# Patient Record
Sex: FEMALE | NOT HISPANIC OR LATINO | Employment: FULL TIME | URBAN - METROPOLITAN AREA
[De-identification: names, ages, dates, MRNs, and addresses within clinical notes are randomized per-mention and may not be internally consistent; named-entity substitution may affect disease eponyms.]

---

## 2017-03-02 ENCOUNTER — APPOINTMENT (OUTPATIENT)
Dept: WOMENS IMAGING | Facility: HOSPITAL | Age: 52
End: 2017-03-02

## 2017-03-02 PROCEDURE — 77067 SCR MAMMO BI INCL CAD: CPT | Performed by: RADIOLOGY

## 2017-03-02 PROCEDURE — 77063 BREAST TOMOSYNTHESIS BI: CPT | Performed by: RADIOLOGY

## 2018-04-17 ENCOUNTER — APPOINTMENT (OUTPATIENT)
Dept: WOMENS IMAGING | Facility: HOSPITAL | Age: 53
End: 2018-04-17

## 2018-04-17 PROCEDURE — 77063 BREAST TOMOSYNTHESIS BI: CPT | Performed by: RADIOLOGY

## 2018-04-17 PROCEDURE — 77067 SCR MAMMO BI INCL CAD: CPT | Performed by: RADIOLOGY

## 2019-05-14 ENCOUNTER — APPOINTMENT (OUTPATIENT)
Dept: WOMENS IMAGING | Facility: HOSPITAL | Age: 54
End: 2019-05-14

## 2019-05-14 PROCEDURE — 77063 BREAST TOMOSYNTHESIS BI: CPT | Performed by: RADIOLOGY

## 2019-05-14 PROCEDURE — 77067 SCR MAMMO BI INCL CAD: CPT | Performed by: RADIOLOGY

## 2020-10-13 ENCOUNTER — APPOINTMENT (OUTPATIENT)
Dept: WOMENS IMAGING | Facility: HOSPITAL | Age: 55
End: 2020-10-13

## 2020-10-13 PROCEDURE — 77063 BREAST TOMOSYNTHESIS BI: CPT | Performed by: RADIOLOGY

## 2020-10-13 PROCEDURE — 77067 SCR MAMMO BI INCL CAD: CPT | Performed by: RADIOLOGY

## 2021-03-24 ENCOUNTER — BULK ORDERING (OUTPATIENT)
Dept: CASE MANAGEMENT | Facility: OTHER | Age: 56
End: 2021-03-24

## 2021-03-24 DIAGNOSIS — Z23 IMMUNIZATION DUE: ICD-10-CM

## 2022-04-26 ENCOUNTER — APPOINTMENT (OUTPATIENT)
Dept: WOMENS IMAGING | Facility: HOSPITAL | Age: 57
End: 2022-04-26

## 2022-04-26 PROCEDURE — 77063 BREAST TOMOSYNTHESIS BI: CPT | Performed by: RADIOLOGY

## 2022-04-26 PROCEDURE — 77067 SCR MAMMO BI INCL CAD: CPT | Performed by: RADIOLOGY

## 2022-05-31 ENCOUNTER — TELEPHONE (OUTPATIENT)
Dept: FAMILY MEDICINE CLINIC | Facility: CLINIC | Age: 57
End: 2022-05-31

## 2022-05-31 NOTE — TELEPHONE ENCOUNTER
OK for HUB to read and schedule:    LMTCB-  Your provider will be out of the office on 6/15/22 & your appointment needs to be rescheduled. Please call our office at [phone] to reschedule with another provider in our office or a later date with your provider.

## 2022-06-16 ENCOUNTER — OFFICE VISIT (OUTPATIENT)
Dept: FAMILY MEDICINE CLINIC | Facility: CLINIC | Age: 57
End: 2022-06-16

## 2022-06-16 VITALS
HEART RATE: 79 BPM | DIASTOLIC BLOOD PRESSURE: 74 MMHG | RESPIRATION RATE: 16 BRPM | HEIGHT: 59 IN | OXYGEN SATURATION: 100 % | SYSTOLIC BLOOD PRESSURE: 128 MMHG | WEIGHT: 161 LBS | TEMPERATURE: 97.5 F | BODY MASS INDEX: 32.46 KG/M2

## 2022-06-16 DIAGNOSIS — E78.2 MIXED HYPERLIPIDEMIA: ICD-10-CM

## 2022-06-16 DIAGNOSIS — E53.8 B12 DEFICIENCY: ICD-10-CM

## 2022-06-16 DIAGNOSIS — E11.65 TYPE 2 DIABETES MELLITUS WITH HYPERGLYCEMIA, WITHOUT LONG-TERM CURRENT USE OF INSULIN: Primary | ICD-10-CM

## 2022-06-16 DIAGNOSIS — F41.9 ANXIETY AND DEPRESSION: ICD-10-CM

## 2022-06-16 DIAGNOSIS — Z86.2 H/O THROMBOCYTOSIS: ICD-10-CM

## 2022-06-16 DIAGNOSIS — F32.A ANXIETY AND DEPRESSION: ICD-10-CM

## 2022-06-16 PROCEDURE — 99203 OFFICE O/P NEW LOW 30 MIN: CPT | Performed by: NURSE PRACTITIONER

## 2022-06-16 PROCEDURE — 90715 TDAP VACCINE 7 YRS/> IM: CPT | Performed by: NURSE PRACTITIONER

## 2022-06-16 PROCEDURE — 90471 IMMUNIZATION ADMIN: CPT | Performed by: NURSE PRACTITIONER

## 2022-06-16 RX ORDER — LISINOPRIL 2.5 MG/1
TABLET ORAL
COMMUNITY
Start: 2022-05-03 | End: 2022-06-16 | Stop reason: SDUPTHER

## 2022-06-16 RX ORDER — ATORVASTATIN CALCIUM 40 MG/1
TABLET, FILM COATED ORAL
COMMUNITY
Start: 2022-05-03 | End: 2022-06-16 | Stop reason: SDUPTHER

## 2022-06-16 RX ORDER — ASPIRIN 81 MG/1
81 TABLET, CHEWABLE ORAL DAILY
COMMUNITY

## 2022-06-16 RX ORDER — HYDROCHLOROTHIAZIDE 12.5 MG/1
CAPSULE, GELATIN COATED ORAL
COMMUNITY
Start: 2022-05-03 | End: 2022-06-16 | Stop reason: SDUPTHER

## 2022-06-16 RX ORDER — LISINOPRIL 2.5 MG/1
2.5 TABLET ORAL DAILY
Qty: 90 TABLET | Refills: 1 | Status: SHIPPED | OUTPATIENT
Start: 2022-06-16 | End: 2023-02-23

## 2022-06-16 RX ORDER — METFORMIN HYDROCHLORIDE 500 MG/1
500 TABLET, EXTENDED RELEASE ORAL
COMMUNITY
Start: 2022-02-15 | End: 2022-06-16 | Stop reason: SDUPTHER

## 2022-06-16 RX ORDER — HYDROCHLOROTHIAZIDE 12.5 MG/1
12.5 CAPSULE, GELATIN COATED ORAL EVERY MORNING
Qty: 90 CAPSULE | Refills: 1 | Status: SHIPPED | OUTPATIENT
Start: 2022-06-16 | End: 2023-02-23

## 2022-06-16 RX ORDER — ATORVASTATIN CALCIUM 40 MG/1
40 TABLET, FILM COATED ORAL NIGHTLY
Qty: 90 TABLET | Refills: 1 | Status: SHIPPED | OUTPATIENT
Start: 2022-06-16 | End: 2022-10-31 | Stop reason: DRUGHIGH

## 2022-06-16 RX ORDER — METFORMIN HYDROCHLORIDE 500 MG/1
500 TABLET, EXTENDED RELEASE ORAL
Qty: 90 TABLET | Refills: 1 | Status: SHIPPED | OUTPATIENT
Start: 2022-06-16 | End: 2023-02-23

## 2022-06-16 NOTE — PROGRESS NOTES
Subjective   Yuliana Moeller is a 56 y.o. female.     History of Present Illness   Yuliana Moeller 56 y.o. female who presents today for a new patient appointment.    she has a history of   Patient Active Problem List   Diagnosis   • Type 2 diabetes mellitus with hyperglycemia, without long-term current use of insulin (HCC)   • Mixed hyperlipidemia   • B12 deficiency   • Depression   • H/O thrombocytosis   .  she is here to establish care and is here to get their medications refilled I reviewed the PFSH recorded today by my MA/LPN staff.   she   She has been feeling fairly well.    She was previously seeing family medicine PCP at [place] for management of above issues.     Patient's last a1c was 11 but she has worked on diet and exercise and her weight has come down from 205 to 161 lbs.  She is due for labs but not fasting today.     She has hx of thrombocytopenia and had workup with hematology.  Will continue to monitor.      Sertraline has helped but feels the dose could be increased due to work related anxiety.   The following portions of the patient's history were reviewed and updated as appropriate: allergies, current medications, past family history, past medical history, past social history, past surgical history and problem list.    Review of Systems   Constitutional: Negative for unexpected weight change.   Respiratory: Negative for shortness of breath.    Cardiovascular: Negative for chest pain and palpitations.   Endocrine: Negative for cold intolerance, heat intolerance, polydipsia, polyphagia and polyuria.   Psychiatric/Behavioral: Negative for behavioral problems.       Objective   Physical Exam  Vitals and nursing note reviewed.   Constitutional:       Appearance: Normal appearance. She is well-developed.   Neck:      Vascular: No carotid bruit.   Cardiovascular:      Rate and Rhythm: Normal rate and regular rhythm.   Pulmonary:      Effort: Pulmonary effort is normal.      Breath sounds: Normal breath  sounds.   Neurological:      Mental Status: She is alert and oriented to person, place, and time.   Psychiatric:         Mood and Affect: Mood normal.         Behavior: Behavior normal.         Thought Content: Thought content normal.         Judgment: Judgment normal.         Assessment & Plan   Diagnoses and all orders for this visit:    1. Type 2 diabetes mellitus with hyperglycemia, without long-term current use of insulin (HCC) (Primary)  -     Comprehensive metabolic panel  -     Lipid panel  -     CBC and Differential  -     TSH  -     Hemoglobin A1c  -     MicroAlbumin, Urine, Random - Urine, Clean Catch  -     Vitamin B12  -     Vitamin D 25 Hydroxy  -     Discontinue: sertraline (ZOLOFT) 50 MG tablet; Take 1 tablet by mouth Daily.  Dispense: 90 tablet; Refill: 1  -     metFORMIN ER (GLUCOPHAGE-XR) 500 MG 24 hr tablet; Take 1 tablet by mouth Daily With Breakfast.  Dispense: 90 tablet; Refill: 1  -     lisinopril (PRINIVIL,ZESTRIL) 2.5 MG tablet; Take 1 tablet by mouth Daily.  Dispense: 90 tablet; Refill: 1  -     hydroCHLOROthiazide (MICROZIDE) 12.5 MG capsule; Take 1 capsule by mouth Every Morning.  Dispense: 90 capsule; Refill: 1  -     atorvastatin (LIPITOR) 40 MG tablet; Take 1 tablet by mouth Every Night.  Dispense: 90 tablet; Refill: 1  -     sertraline (ZOLOFT) 50 MG tablet; Take 1 tablet by mouth Daily.  Dispense: 135 tablet; Refill: 1    2. Mixed hyperlipidemia  -     Comprehensive metabolic panel  -     Lipid panel  -     CBC and Differential  -     TSH  -     Hemoglobin A1c  -     MicroAlbumin, Urine, Random - Urine, Clean Catch  -     Vitamin B12  -     Vitamin D 25 Hydroxy  -     Discontinue: sertraline (ZOLOFT) 50 MG tablet; Take 1 tablet by mouth Daily.  Dispense: 90 tablet; Refill: 1  -     metFORMIN ER (GLUCOPHAGE-XR) 500 MG 24 hr tablet; Take 1 tablet by mouth Daily With Breakfast.  Dispense: 90 tablet; Refill: 1  -     lisinopril (PRINIVIL,ZESTRIL) 2.5 MG tablet; Take 1 tablet by mouth  Daily.  Dispense: 90 tablet; Refill: 1  -     hydroCHLOROthiazide (MICROZIDE) 12.5 MG capsule; Take 1 capsule by mouth Every Morning.  Dispense: 90 capsule; Refill: 1  -     atorvastatin (LIPITOR) 40 MG tablet; Take 1 tablet by mouth Every Night.  Dispense: 90 tablet; Refill: 1  -     sertraline (ZOLOFT) 50 MG tablet; Take 1 tablet by mouth Daily.  Dispense: 135 tablet; Refill: 1    3. B12 deficiency  -     Comprehensive metabolic panel  -     Lipid panel  -     CBC and Differential  -     TSH  -     Hemoglobin A1c  -     MicroAlbumin, Urine, Random - Urine, Clean Catch  -     Vitamin B12  -     Vitamin D 25 Hydroxy  -     Discontinue: sertraline (ZOLOFT) 50 MG tablet; Take 1 tablet by mouth Daily.  Dispense: 90 tablet; Refill: 1  -     metFORMIN ER (GLUCOPHAGE-XR) 500 MG 24 hr tablet; Take 1 tablet by mouth Daily With Breakfast.  Dispense: 90 tablet; Refill: 1  -     lisinopril (PRINIVIL,ZESTRIL) 2.5 MG tablet; Take 1 tablet by mouth Daily.  Dispense: 90 tablet; Refill: 1  -     hydroCHLOROthiazide (MICROZIDE) 12.5 MG capsule; Take 1 capsule by mouth Every Morning.  Dispense: 90 capsule; Refill: 1  -     atorvastatin (LIPITOR) 40 MG tablet; Take 1 tablet by mouth Every Night.  Dispense: 90 tablet; Refill: 1  -     sertraline (ZOLOFT) 50 MG tablet; Take 1 tablet by mouth Daily.  Dispense: 135 tablet; Refill: 1    4. Anxiety and depression  -     Discontinue: sertraline (ZOLOFT) 50 MG tablet; Take 1 tablet by mouth Daily.  Dispense: 90 tablet; Refill: 1  -     metFORMIN ER (GLUCOPHAGE-XR) 500 MG 24 hr tablet; Take 1 tablet by mouth Daily With Breakfast.  Dispense: 90 tablet; Refill: 1  -     lisinopril (PRINIVIL,ZESTRIL) 2.5 MG tablet; Take 1 tablet by mouth Daily.  Dispense: 90 tablet; Refill: 1  -     hydroCHLOROthiazide (MICROZIDE) 12.5 MG capsule; Take 1 capsule by mouth Every Morning.  Dispense: 90 capsule; Refill: 1  -     atorvastatin (LIPITOR) 40 MG tablet; Take 1 tablet by mouth Every Night.  Dispense: 90  tablet; Refill: 1  -     sertraline (ZOLOFT) 50 MG tablet; Take 1 tablet by mouth Daily.  Dispense: 135 tablet; Refill: 1    5. H/O thrombocytosis  -     Comprehensive metabolic panel  -     Lipid panel  -     CBC and Differential  -     TSH  -     Hemoglobin A1c  -     MicroAlbumin, Urine, Random - Urine, Clean Catch  -     Vitamin B12  -     Vitamin D 25 Hydroxy  -     Discontinue: sertraline (ZOLOFT) 50 MG tablet; Take 1 tablet by mouth Daily.  Dispense: 90 tablet; Refill: 1  -     metFORMIN ER (GLUCOPHAGE-XR) 500 MG 24 hr tablet; Take 1 tablet by mouth Daily With Breakfast.  Dispense: 90 tablet; Refill: 1  -     lisinopril (PRINIVIL,ZESTRIL) 2.5 MG tablet; Take 1 tablet by mouth Daily.  Dispense: 90 tablet; Refill: 1  -     hydroCHLOROthiazide (MICROZIDE) 12.5 MG capsule; Take 1 capsule by mouth Every Morning.  Dispense: 90 capsule; Refill: 1  -     atorvastatin (LIPITOR) 40 MG tablet; Take 1 tablet by mouth Every Night.  Dispense: 90 tablet; Refill: 1  -     sertraline (ZOLOFT) 50 MG tablet; Take 1 tablet by mouth Daily.  Dispense: 135 tablet; Refill: 1

## 2022-06-22 ENCOUNTER — TELEPHONE (OUTPATIENT)
Dept: GASTROENTEROLOGY | Facility: CLINIC | Age: 57
End: 2022-06-22

## 2022-06-23 LAB
25(OH)D3+25(OH)D2 SERPL-MCNC: 32.4 NG/ML (ref 30–100)
ALBUMIN SERPL-MCNC: 4.5 G/DL (ref 3.8–4.9)
ALBUMIN/GLOB SERPL: 1.7 {RATIO} (ref 1.2–2.2)
ALP SERPL-CCNC: 136 IU/L (ref 44–121)
ALT SERPL-CCNC: 23 IU/L (ref 0–32)
AST SERPL-CCNC: 16 IU/L (ref 0–40)
BASOPHILS # BLD AUTO: 0.1 X10E3/UL (ref 0–0.2)
BASOPHILS NFR BLD AUTO: 1 %
BILIRUB SERPL-MCNC: 0.3 MG/DL (ref 0–1.2)
BUN SERPL-MCNC: 12 MG/DL (ref 6–24)
BUN/CREAT SERPL: 17 (ref 9–23)
CALCIUM SERPL-MCNC: 9.7 MG/DL (ref 8.7–10.2)
CHLORIDE SERPL-SCNC: 95 MMOL/L (ref 96–106)
CHOLEST SERPL-MCNC: 232 MG/DL (ref 100–199)
CO2 SERPL-SCNC: 25 MMOL/L (ref 20–29)
CREAT SERPL-MCNC: 0.72 MG/DL (ref 0.57–1)
EGFRCR SERPLBLD CKD-EPI 2021: 99 ML/MIN/1.73
EOSINOPHIL # BLD AUTO: 0.2 X10E3/UL (ref 0–0.4)
EOSINOPHIL NFR BLD AUTO: 3 %
ERYTHROCYTE [DISTWIDTH] IN BLOOD BY AUTOMATED COUNT: 11.8 % (ref 11.7–15.4)
GLOBULIN SER CALC-MCNC: 2.7 G/DL (ref 1.5–4.5)
GLUCOSE SERPL-MCNC: 393 MG/DL (ref 65–99)
HBA1C MFR BLD: >15.5 % (ref 4.8–5.6)
HCT VFR BLD AUTO: 43.4 % (ref 34–46.6)
HDLC SERPL-MCNC: 36 MG/DL
HGB BLD-MCNC: 14.6 G/DL (ref 11.1–15.9)
IMM GRANULOCYTES # BLD AUTO: 0 X10E3/UL (ref 0–0.1)
IMM GRANULOCYTES NFR BLD AUTO: 1 %
LDLC SERPL CALC-MCNC: 168 MG/DL (ref 0–99)
LYMPHOCYTES # BLD AUTO: 1.7 X10E3/UL (ref 0.7–3.1)
LYMPHOCYTES NFR BLD AUTO: 27 %
MCH RBC QN AUTO: 30.6 PG (ref 26.6–33)
MCHC RBC AUTO-ENTMCNC: 33.6 G/DL (ref 31.5–35.7)
MCV RBC AUTO: 91 FL (ref 79–97)
MICROALBUMIN UR-MCNC: 7.8 UG/ML
MONOCYTES # BLD AUTO: 0.4 X10E3/UL (ref 0.1–0.9)
MONOCYTES NFR BLD AUTO: 6 %
NEUTROPHILS # BLD AUTO: 3.9 X10E3/UL (ref 1.4–7)
NEUTROPHILS NFR BLD AUTO: 62 %
PLATELET # BLD AUTO: 353 X10E3/UL (ref 150–450)
POTASSIUM SERPL-SCNC: 4.3 MMOL/L (ref 3.5–5.2)
PROT SERPL-MCNC: 7.2 G/DL (ref 6–8.5)
RBC # BLD AUTO: 4.77 X10E6/UL (ref 3.77–5.28)
SODIUM SERPL-SCNC: 137 MMOL/L (ref 134–144)
TRIGL SERPL-MCNC: 151 MG/DL (ref 0–149)
TSH SERPL DL<=0.005 MIU/L-ACNC: 4.18 UIU/ML (ref 0.45–4.5)
VIT B12 SERPL-MCNC: 446 PG/ML (ref 232–1245)
VLDLC SERPL CALC-MCNC: 28 MG/DL (ref 5–40)
WBC # BLD AUTO: 6.3 X10E3/UL (ref 3.4–10.8)

## 2022-06-24 ENCOUNTER — TELEPHONE (OUTPATIENT)
Dept: FAMILY MEDICINE CLINIC | Facility: CLINIC | Age: 57
End: 2022-06-24

## 2022-06-24 DIAGNOSIS — E11.65 TYPE 2 DIABETES MELLITUS WITH HYPERGLYCEMIA, WITHOUT LONG-TERM CURRENT USE OF INSULIN: Primary | ICD-10-CM

## 2022-06-24 NOTE — TELEPHONE ENCOUNTER
----- Message from JOSEPHINE Dixon sent at 6/24/2022  9:10 AM EDT -----  A1c is over 15 which is exceptionally high.  Needs urgent referral to endocrinology.  Also needs to be started on Lantus 14 units at bedtime.  Inquire if taking atorvastatin when labs done bc LDL is still very high.

## 2022-07-01 ENCOUNTER — OFFICE VISIT (OUTPATIENT)
Dept: ENDOCRINOLOGY | Age: 57
End: 2022-07-01

## 2022-07-01 VITALS
BODY MASS INDEX: 32.25 KG/M2 | HEART RATE: 81 BPM | HEIGHT: 59 IN | DIASTOLIC BLOOD PRESSURE: 70 MMHG | SYSTOLIC BLOOD PRESSURE: 118 MMHG | OXYGEN SATURATION: 99 % | TEMPERATURE: 96.2 F | WEIGHT: 160 LBS

## 2022-07-01 DIAGNOSIS — E78.2 MIXED HYPERLIPIDEMIA: ICD-10-CM

## 2022-07-01 DIAGNOSIS — E11.65 TYPE 2 DIABETES MELLITUS WITH HYPERGLYCEMIA, WITH LONG-TERM CURRENT USE OF INSULIN: Primary | ICD-10-CM

## 2022-07-01 DIAGNOSIS — E11.65 TYPE 2 DIABETES MELLITUS WITH HYPERGLYCEMIA, WITHOUT LONG-TERM CURRENT USE OF INSULIN: ICD-10-CM

## 2022-07-01 DIAGNOSIS — Z79.4 TYPE 2 DIABETES MELLITUS WITH HYPERGLYCEMIA, WITH LONG-TERM CURRENT USE OF INSULIN: Primary | ICD-10-CM

## 2022-07-01 DIAGNOSIS — E66.09 CLASS 1 OBESITY DUE TO EXCESS CALORIES WITH SERIOUS COMORBIDITY AND BODY MASS INDEX (BMI) OF 32.0 TO 32.9 IN ADULT: ICD-10-CM

## 2022-07-01 DIAGNOSIS — I10 PRIMARY HYPERTENSION: ICD-10-CM

## 2022-07-01 PROBLEM — E66.811 CLASS 1 OBESITY DUE TO EXCESS CALORIES WITH SERIOUS COMORBIDITY AND BODY MASS INDEX (BMI) OF 32.0 TO 32.9 IN ADULT: Status: ACTIVE | Noted: 2022-07-01

## 2022-07-01 PROCEDURE — 99205 OFFICE O/P NEW HI 60 MIN: CPT | Performed by: INTERNAL MEDICINE

## 2022-07-01 RX ORDER — LANCETS 33 GAUGE
EACH MISCELLANEOUS
Qty: 400 EACH | Refills: 3 | Status: SHIPPED | OUTPATIENT
Start: 2022-07-01 | End: 2022-07-29

## 2022-07-01 RX ORDER — PEN NEEDLE, DIABETIC 32GX 5/32"
NEEDLE, DISPOSABLE MISCELLANEOUS
Qty: 500 EACH | Refills: 4 | Status: SHIPPED | OUTPATIENT
Start: 2022-07-01

## 2022-07-01 RX ORDER — INSULIN ASPART 100 [IU]/ML
INJECTION, SOLUTION INTRAVENOUS; SUBCUTANEOUS
Qty: 15 ML | Refills: 6 | Status: SHIPPED | OUTPATIENT
Start: 2022-07-01 | End: 2022-12-05 | Stop reason: SDUPTHER

## 2022-07-01 NOTE — PATIENT INSTRUCTIONS
Home instructions 6/30/22    Preventive care:  A.  Eyecare decreases blindness: Dilated eye examination recommended annually    B.  Dental care decreases heart attacks.  Dental cleanings twice a year recommended.    C..  Foot care significantly decreases infection and  loss of limbs.    Daily visualization and lotion of feet at bedtime.  Be mindful to wear shoes and socks  with toe covering to prevent trauma to toes.  Podiatry visit to monitor and treat foot disorders, toenail disorders and trimming.    D.  Exercise daily improves general health and ASSISTS  weight management.  Walking 15 minutes after each meal and if possible working up to 10,000 steps per day carefully with good tread shoes on stable ground.    E.  Weight management decreases worsening of diabetes.    Meal planning and scheduling of 3 meals and 3 snacks strongly encouraged and confirmed by research   to be the # 1 action that adults can take to reduce weight.  Exercise alone actually increases appetite.     E. Visit with your primary care provider for routine maintenance screening at annual visits to see if you need:     Example: Colon cancer screening, colonoscopies at age 50, mammograms, keep vaccinations up-to-date, screening for Hepatitis C in adults.  Routine female exams    Diabetes suppresses the healthy immune system.  Ask your provider if the following are for you: pneumonia vaccine, COVID-vaccine, shingles vaccine, hepatitis B vaccine in adults, .    F.  Biofeedback research indicates maintaining a positive attitude, getting to sleep at the same time every night, eating healthy and exercising regularly improves health.  The past as the past.  The future is every step you take to improve your health.    G. How to prevent heart attacks: High glucose over time promotes small and large blood vessel blockage like a water pipe with hard water exposure.  High cholesterol also promotes vessel blockage.     H. Protect your blood vessels to guard  against the damage to kidneys, nerves, vision, heart and stroke with correction of diabetes and elevated cholesterol.     You can make a significant difference by managing the food you eat. Avoiding saturated fats, avoiding large portions of carbohydrates and sweets.     Diabetes medications    Measure glucose before each meal and bedtime    Take Lantus  solostar 20 units at bedtime   Note if morning premeal glucose is over 150mg/dl x 3 mornings then increase lantus by 2 units. Repeat until reach am goal of   Take metformin  mg twice a day    Mealtime           Take Humalog, lispro/NovoLog,/ Novolin R /aspart per scale, 15 minutes before meals  Add scale as follows  FOOD  If total carbohydrate intake is 50 to 60 g then take 6 units plus scale  If total carbohydrates 30-50 g then take 5 units plus scale  If total carbohydrates less than 30 g then take 0 units plus the scale      If blood glucose is above 140 then add the following dose of Humalog, lispro/NovoLog, aspart to mealtime insulin [1: 30]    140-170 0 unit  171-200 2 units  201-230 3 units  231-260 4 units  261-290 5 units  291-320 6 units  321-350 7 units  351-380 8 units  381-or greater 9 units    Sick day rules: If glucose is over 250 mg/DL then check glucose every 2 hours and treat with correction scale above.    Example at breakfast take mealtime insulin as above and if glucose 268 then [per scale above] add 5 units.   Take total dose 15 minutes before meal. If at a restaurant, unsure when meal is coming then may take mealtime insulin immediately after the last bite of food.  -------------------------------------------------------------------------------------------------------------------------    Using cell phone or computer Mealtime total carbohydrate intake     Calorie Thier[employer] applications.      Meal planning  45 g of total carbohydrates plan  Meals 3 meals and 3 snacks  Please limit meals to 45 g per meal (3 servings)  Please limit snacks  to 15-20 g per snack (1 serving)    Counting total carbohydrates use phone henrry calorie [email or web address]    1 serving equal 15 g (about half a cup)  2 servings equal 30 g (about 1 cup)  3 servings equals 45 g  4 servings equal 60 g  5 servings ago 75 g  6 servings equals 90 g    Breakfast example (5 servings of carbohydrates)    A.  Half a cup cereal (1 servings) and 1 cup milk (1 serving) and 1 fruit/half a cup fruit juice (1 serving), 1 slice of bread with peanut butter (1 serving) = 4servings carbohydrate    B.  Eggs plus gamble plus sausage plus cheese with 2 slices of bread (2 servings) and one yogurt (1 serving )and one fruit (1 serving) and half a cup hash brown potatoes (1 serving) =5 servings carbohydrate    Lunch or dinner example  A.  [place] equals 2 slices of bread (2 servings) and 1 fruit (1 serving), half a cup of potato salad (1 serving) and half a cup of ice cream (1 serving ) = 5 servings carbohydrates    B.  Any meat/chicken/fish and 2 cups Pasta (4 servings) and 1 slice of bread (1 serving)  = 5 servings carbohydrate    C.  Any meat chicken fish 1 cup of rice (3 servings), 1 fruit (1 serving) = 4 servings of carbohydrate    May add vegetables/salads to any meal for free    Except the following are 1 serving of carbohydrates    A.  Lima beans half a cup (1 serving)  B.  1 cup kidney/simmons beans (1 serving 30 g -14 g fiber equals 16 g)  C.  Half a cup of corn (1 serving)  D.  Half cup of peas (1 serving)  E.  Half a cup of potato, cubes (medium potato) (1 serving)  F  Sweets: Prep packaged 3 ounce ice cream cup, Dial brand/Nestlé 1 serving (15 g carbohydrates)  Notes: Fruits: 1 fruit about the size for lady's visit is 1 serving of carbohydrate  Half a cup fruit juice 1 serving    Snack examples (1 serving carbohydrate/15 g]    1.  No carbohydrates (0 servings):  Raw celery, radishes, bell pepper, banana peppers, corn, carrots, cucumbers dipped in salad dressings, guacamole, [place] seasoning, peanut  butter, almond butter, salsa    2.  15 g (1 serving): Half a sandwich: Turkey and cheese, smoked salmon and cream cheese, peanut butter and banana slices, cheese and crackers, peanut butter and crackers, half a bagel or half an English muffin or 1 cup of fruit plus cottage cheese or 1 rice cake with sugar-free fruit spreading    3.  15 g or less (1 serving): Protein shakes: Ensure DM, Glucerna, boost protein: Wings and salad dressing, 1 taco    4.  Fruit, 1 servin apple/orange/8 large grapes/peach/a protein/fat (avocado/nuts/meat/ cottage cheese)      Hypercholesterolemia  meal planning  Nutrition counseling  1. Improve glucose control  2. Limit saturated fats to less than 30% at each meal [Use Calorie  nirav to read product labels]  3. Limit or avoid the following fats: Gamble, butter, lard, coconut. Substitute with turkey gamble, other vegetable oils, margarine  Limit or avoid marbling/fat on beef. Avoid hamburgers. Substitute with turkey/chicken/venison/vegetarians/salmon burgers.  Limit dairy: Milk cheese butter.  Limit eggs to 3 a week. Substitute with unlimited egg whites.  Limit bagels, biscuits, croissants: Substitute with wheat bread, English muffin, [place] pockets, tortilla

## 2022-07-01 NOTE — PROGRESS NOTES
Chief Complaint  Diabetes (Type 2)  E11.65 (ICD-10-CM) - Type 2 diabetes mellitus with hyperglycemia, without long-term current use of insulin (HCC)  Subjective      Yuliana Moeller presents with her spouse (he is the  of the home) to [facility] ENDOCRINOLOGY  History of Present Illness   In 2021 she had an A1C of 11 % so she lost 45 lbs to lower it. More recently HGA1C 15% she is currently on metformin.  Lantus was prescribed 14 units daily.  She just picked up the Lantus Solostar pen and has not yet started using it.      Prevention: Lisinopril 2.5 mg  Lantus Solostar, not started.   Metformin  mg  Duration of diabetes: for 10 years. Her birth father     Since last visit : Initial visit today as requested by Daja Connor APRN     Monitoring glucose:  CGM use: Introduced today and we will place a sample and ordered.  Glucometer use:  How often patient is testing: None  In the last month lowest glucose: Does not test  In the last month highest glucose: Noted on most recent CMP    Symptoms:  Hypoglycemia: None  Hyperglycemia: Present frequent urination and frequent thirst  Peripheral neuropathy symptoms of paresthesias burning numbness tingling: Present in the hands along with some swelling.  Vision changes: Blurred vision uses readers  Skin changes or ulcers: None  Polyuria: Significantly present  Polydipsia: Significantly present  Gastroparesis symptoms: No early satiety    Prevention:  Last eye examination never.  Instructed patient  Last podiatry visit never.    History of heart disease: None  History of kidney disease: None  History of stroke: None    History of hypertension: Patient takes hydrochlorothiazide 12.5 mg daily and lisinopril 2.5 mg daily  History of hyperlipidemia: Patient takes atorvastatin 40 mg daily      Past Medical History:   Diagnosis Date   • Diabetes mellitus (HCC) unknown    pre-diabetes   • Obesity unknown    have lost over 40 pounds   History  "reviewed. No pertinent surgical history.   Social History     Socioeconomic History   • Marital status:    Tobacco Use   • Smoking status: Never Smoker   Substance and Sexual Activity   • Alcohol use: Yes     Comment: very little   • Drug use: Never   • Sexual activity: Defer     Partners: Male     Objective   Vital Signs:  /70   Pulse 81   Temp 96.2 °F (35.7 °C)   Ht 149.9 cm (59.02\")   Wt 72.6 kg (160 lb)   SpO2 99%   BMI 32.30 kg/m²   Estimated body mass index is 32.3 kg/m² as calculated from the following:    Height as of this encounter: 149.9 cm (59.02\").    Weight as of this encounter: 72.6 kg (160 lb).    BMI is >= 30 and <35. (Class 1 Obesity). The following options were offered after discussion;: nutrition counseling/recommendations and referral to a nutritionist      Physical Exam  Vitals and nursing note reviewed.   HENT:      Head: Normocephalic.      Mouth/Throat:      Mouth: Mucous membranes are moist.   Cardiovascular:      Rate and Rhythm: Normal rate.      Pulses: Normal pulses.           Dorsalis pedis pulses are 2+ on the right side and 2+ on the left side.        Posterior tibial pulses are 2+ on the right side and 2+ on the left side.      Heart sounds: Normal heart sounds.   Pulmonary:      Effort: Pulmonary effort is normal.      Breath sounds: Normal breath sounds. No wheezing or rhonchi.   Abdominal:      General: Bowel sounds are normal.      Palpations: Abdomen is soft.   Musculoskeletal:         General: No swelling. Normal range of motion.      Cervical back: Normal range of motion and neck supple.   Feet:      Right foot:      Protective Sensation: 10 sites tested. 10 sites sensed.      Skin integrity: Skin integrity normal.      Left foot:      Protective Sensation: 10 sites tested. 10 sites sensed.      Skin integrity: Skin integrity normal.   Skin:     General: Skin is warm and dry.   Neurological:      Mental Status: She is alert and oriented to person, place, and " time. Mental status is at baseline.   Psychiatric:         Mood and Affect: Mood normal.         Behavior: Behavior normal.         Judgment: Judgment normal.        Result Review :The following data was reviewed by: Hansa Anthony MD on 07/01/2022:           Component      Latest Ref Rng & Units 6/22/2022 6/22/2022 6/22/2022 6/22/2022           8:12 AM  8:12 AM  8:12 AM  8:12 AM   WBC      3.4 - 10.8 x10E3/uL   6.3    RBC      3.77 - 5.28 x10E6/uL   4.77    Hemoglobin      11.1 - 15.9 g/dL   14.6    Hematocrit      34.0 - 46.6 %   43.4    MCV      79 - 97 fL   91    MCH      26.6 - 33.0 pg   30.6    MCHC      31.5 - 35.7 g/dL   33.6    RDW      11.7 - 15.4 %   11.8    Platelets      150 - 450 x10E3/uL   353    Neutrophil Rel %      Not Estab. %   62    Lymphocyte Rel %      Not Estab. %   27    Monocyte Rel %      Not Estab. %   6    Eosinophil Rel %      Not Estab. %   3    Basophil Rel %      Not Estab. %   1    Neutrophils Absolute      1.4 - 7.0 x10E3/uL   3.9    Lymphocytes Absolute      0.7 - 3.1 x10E3/uL   1.7    Monocytes Absolute      0.1 - 0.9 x10E3/uL   0.4    Eosinophils Absolute      0.0 - 0.4 x10E3/uL   0.2    Basophils Absolute      0.0 - 0.2 x10E3/uL   0.1    Immature Granulocyte Rel %      Not Estab. %   1    Immature Grans, Absolute      0.0 - 0.1 x10E3/uL   0.0    Glucose      65 - 99 mg/dL 393 (H)      BUN      6 - 24 mg/dL 12      Creatinine      0.57 - 1.00 mg/dL 0.72      EGFR Result      >59 mL/min/1.73 99      BUN/Creatinine Ratio      9 - 23 17      Sodium      134 - 144 mmol/L 137      Potassium      3.5 - 5.2 mmol/L 4.3      Chloride      96 - 106 mmol/L 95 (L)      CO2      20 - 29 mmol/L 25      Calcium      8.7 - 10.2 mg/dL 9.7      Total Protein      6.0 - 8.5 g/dL 7.2      Albumin      3.8 - 4.9 g/dL 4.5      Globulin      1.5 - 4.5 g/dL 2.7      A/G Ratio      1.2 - 2.2 1.7      Total Bilirubin      0.0 - 1.2 mg/dL 0.3      Alkaline Phosphatase      44 - 121 IU/L 136 (H)       AST (SGOT)      0 - 40 IU/L 16      ALT (SGPT)      0 - 32 IU/L 23      Total Cholesterol      100 - 199 mg/dL  232 (H)     Triglycerides      0 - 149 mg/dL  151 (H)     HDL Cholesterol      >39 mg/dL  36 (L)     VLDL Cholesterol Meek      5 - 40 mg/dL  28     LDL Cholesterol       0 - 99 mg/dL  168 (H)     TSH Baseline      0.450 - 4.500 uIU/mL    4.180     Component      Latest Ref Rng & Units 6/22/2022 6/22/2022 6/22/2022 6/22/2022           8:12 AM  8:12 AM  8:12 AM  8:12 AM   Hemoglobin A1C      4.8 - 5.6 % >15.5 (H)      Microalbumin, Urine      Not Estab. ug/mL  7.8     Vitamin B-12      232 - 1,245 pg/mL   446    25 Hydroxy, Vitamin D      30.0 - 100.0 ng/mL    32.4        Assessment and Plan  56-year-old  female with type 2 diabetes, BMI 32 on insulin therapy.  Comorbidities: Hyperlipidemia, hypertension, depression, B12 deficiency  Significance: No history of nephropathy, neuropathy, retinopathy    Diabetes medications    Measure glucose before each meal and bedtime    Take Lantus  solostar 20 units at bedtime   Note if morning premeal glucose is over 150mg/dl x 3 mornings then increase lantus by 2 units. Repeat until reach am goal of   Take metformin  mg twice a day  Mealtime           Take Humalog, lispro/NovoLog,/ Novolin R /aspart per scale, 15 minutes before meals  Add scale as follows  FOOD  If total carbohydrate intake is 50 to 60 g then take 6 units plus scale  If total carbohydrates 30-50 g then take 5 units plus scale  If total carbohydrates less than 30 g then take 0 units plus the scale      If blood glucose is above 140 then add the following dose of Humalog, lispro/NovoLog, aspart to mealtime insulin [1: 30]    140-170 0 unit  171-200 2 units  201-230 3 units  231-260 4 units  261-290 5 units  291-320 6 units  321-350 7 units  351-380 8 units  381-or greater 9 units    Sick day rules: If glucose is over 250 mg/DL then check glucose every 2 hours and treat with  correction scale above.    Example at breakfast take mealtime insulin as above and if glucose 268 then [per scale above] add 5 units.   Take total dose 15 minutes before meal. If at a restaurant, unsure when meal is coming then may take mealtime insulin immediately after the last bite of food.  -------------------------------------------------------------------------------------------------------------------------    Using cell phone or computer Mealtime total carbohydrate intake     Calorie Thierry applications.      Meal planning  45 g of total carbohydrates plan  Meals 3 meals and 3 snacks  Please limit meals to 45 g per meal (3 servings)  Please limit snacks to 15-20 g per snack (1 serving)          Diagnoses and all orders for this visit:    1. Type 2 diabetes mellitus with hyperglycemia, with long-term current use of insulin (HCC) (Primary)  -     Anti-islet Cell Antibody  -     Glutamic Acid Decarboxylase  -     C-Peptide  -     TSH Rfx On Abnormal To Free T4  -     Microalbumin / Creatinine Urine Ratio - Urine, Clean Catch  -     Insulin Antibody  -     Ambulatory Referral to Nutrition Services  -     Ambulatory Referral to Diabetic Education  -     insulin aspart (NovoLOG FlexPen) 100 UNIT/ML solution pen-injector sc pen; Maximum 50 units per day 5-6 units plus scale of target 140 , 1  units per 30 mg/dl glucose ac tid Use as directed for meal & correction coverage up to 50 units a day.  Dispense: 15 mL; Refill: 6  -     Insulin Pen Needle (ReliOn Pen Needles) 32G X 4 MM misc; For use with pen device up to 5 times a day. Can substitute based on availability and insurance. Dx E11.65  Dispense: 500 each; Refill: 4  -     Continuous Blood Gluc Sensor (FreeStyle Prasanna 2 Sensor) misc; 1 each Every 14 (Fourteen) Days. Freestyle Prasanna 14-day sensors one for each 14 day period to check 6 times a day. Dx: E 11.65  Dispense: 6 each; Refill: 4    2. Type 2 diabetes mellitus with hyperglycemia, without long-term current  use of insulin (HCC)    3. Mixed hyperlipidemia    4. Primary hypertension    5. Class 1 obesity due to excess calories with serious comorbidity and body mass index (BMI) of 32.0 to 32.9 in adult    Other orders  -     Insulin Glargine (LANTUS SOLOSTAR) 100 UNIT/ML injection pen; Inject 20 Units under the skin into the appropriate area as directed Every Night. And as directed to max of 50 units per day.  Dispense: 3 mL; Refill: 5      Hyperlipidemia with elevated cholesterol LDL.  Encourage limiting saturated fats and statin drug.  Hypertension stable continue current medications.  Prevention: Patient is on lisinopril 2.5 mg daily.  Will consider changing to ramipril 2.5 mg daily for better protection at that dose at next visit.  Will check urine microalbumin today.  Recommend delay eye examination for at least 6 weeks due to glucose being so severely uncontrolled now.  Patient instructed on meal planning, how to use a glucometer, glucometer given, test trips ordered, CGM freestyle roman describe discussed sample given and order placed to pharmacy.  Applications on phone for counting carbohydrates and CGM given installed.  Questions answered.     I spent 65 minutes caring for Yuliana on this date of service. This time includes time spent by me in the following activities:reviewing tests, obtaining and/or reviewing a separately obtained history, performing a medically appropriate examination and/or evaluation , counseling and educating the patient/family/caregiver, ordering medications, tests, or procedures and referring and communicating with other health care professionals   Follow Up Return in about 4 weeks (around 7/29/2022) for Recheck.  Patient was given instructions and counseling regarding her condition or for health maintenance advice. Please see specific information pulled into the AVS if appropriate.

## 2022-07-09 LAB
ALBUMIN/CREAT UR: 10 MG/G CREAT (ref 0–29)
C PEPTIDE SERPL-MCNC: 2.4 NG/ML (ref 1.1–4.4)
CREAT UR-MCNC: 60.6 MG/DL
GAD65 AB SER IA-ACNC: ABNORMAL U/ML (ref 0–5)
INSULIN AB SER-ACNC: <5 UU/ML
MICROALBUMIN UR-MCNC: 5.9 UG/ML
PANC ISLET CELL AB TITR SER: NEGATIVE {TITER}
TSH SERPL DL<=0.005 MIU/L-ACNC: 2.88 UIU/ML (ref 0.45–4.5)

## 2022-07-11 ENCOUNTER — TELEPHONE (OUTPATIENT)
Dept: ENDOCRINOLOGY | Age: 57
End: 2022-07-11

## 2022-07-25 DIAGNOSIS — E13.9: Primary | ICD-10-CM

## 2022-07-25 PROBLEM — E11.65 TYPE 2 DIABETES MELLITUS WITH HYPERGLYCEMIA, WITHOUT LONG-TERM CURRENT USE OF INSULIN: Status: RESOLVED | Noted: 2022-06-16 | Resolved: 2022-07-25

## 2022-07-25 NOTE — PROGRESS NOTES
Chief Complaint  Chief Complaint   Patient presents with   • Diabetes     Type2: Prasanna        Subjective          History of Present Illness    Yuliana Moeller 56 y.o. 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)    Patient was diagnosed around 2017 and started insulin 07/22  WILLIS positive in 07/22    Pt is on the following medications for their DM: Lantus 22 units at bedtime metformin  mg BID      If total carbohydrate intake is 50 to 60 g then take 6 units plus scale  If total carbohydrates 30-50 g then take 5 units plus scale  If total carbohydrates less than 30 g then take 0 units plus the scale      If blood glucose is above 140 then add the following dose of NovoLog to mealtime insulin    140-170          0 unit  171-200          2 units  201-230          3 units  231-260          4 units  261-290          5 units  291-320          6 units  321-350          7 units  351-380          8 units  381-or greater 9 units    Sick day rules: If glucose is over 250 mg/DL then check glucose every 2 hours and treat with correction scale above.        Pt complains of vision changes.    Denies diarrhea, constipation, chest pain, shortness of breath and numbness and tingling in feet/hands.    Last eye exam was over 1 year - pt is going to schedule    Pt does not have a history of nephropathy.  Patient is currently taking ACE    Pt does not have neuropathy.    Pt does not have a history of CAD or CVA.    Last A1C in 06/22 was >15.5    Last microalbumin in 07/22 was negative          Blood Sugars    Blood glucoses are checked via prasanna.    Fasting blood glucoses: mid 100s    Pre-meal blood glucoses: 60s - 200s    Pt has has some episodes of hypoglycemia, mostly after breakfast          Sensor Data    Time in range 75%  High 19%  Very high 6%  Low 0%  Very low 0%      Average Glucose - 159 mg/dL      Sensor Wear - 95 %            Hyperlipidemia     Pt denies any muscle/body  "aches, chest pain, or shortness of breath    Pt is currently taking atorvastatin 40 mg HS    Last lipid panel in 06/22 showed Total 232, Triglycerides 151, HDL 36 and             Hypertension    Pt denies any chest pain, palpitations, shortness of breath, or headache    Current regimen includes lisinopril 2.5 mg daily and HCTZ 12.5 mg daily             I have reviewed the patient's allergies, medicines, past medical hx, family hx and social hx.    Objective   Vital Signs:   /80   Pulse 77   Temp 97.8 °F (36.6 °C) (Temporal)   Ht 149.9 cm (59\")   Wt 75.3 kg (166 lb)   SpO2 99%   BMI 33.53 kg/m²       Physical Exam   Physical Exam  Constitutional:       General: She is not in acute distress.     Appearance: Normal appearance. She is not diaphoretic.   HENT:      Head: Normocephalic and atraumatic.   Eyes:      General:         Right eye: No discharge.         Left eye: No discharge.   Skin:     General: Skin is warm and dry.   Neurological:      Mental Status: She is alert.   Psychiatric:         Mood and Affect: Mood normal.         Behavior: Behavior normal.                    Results Review:   Hemoglobin A1C   Date Value Ref Range Status   06/22/2022 >15.5 (H) 4.8 - 5.6 % Final     Comment:     **Verified by repeat analysis**           Prediabetes: 5.7 - 6.4           Diabetes: >6.4           Glycemic control for adults with diabetes: <7.0     05/26/2021 11.0 (H) 4.3 - 5.6 % Final     Comment:     (note)  A1C% Reference Range:  4.3 - 5.6  Normal range  5.7 - 6.4  Pre-diabetic -increased risk for developing diabetes mellitus.  >=6.5      Diabetic -diagnostic of diabetes mellitus.     Note: For diagnosis of diabetes in individuals without unequivocal   hyperglycemia, results should be confirmed by repeat testing.  Patients with conditions that shorten erythrocyte survival, such as  recovery from acute blood loss, hemolytic anemia, kidney disease,  or the presence of unstable hemoglobins like HbSS, " HbCC, and HbSC  may yield falsely decreased HbA1c test results. Iron deficiency may  yield falsely increased HbA1c test results.     Triglycerides   Date Value Ref Range Status   06/22/2022 151 (H) 0 - 149 mg/dL Final   09/30/2020 156 (H) 0 - 149 mg/dL Final     HDL Cholesterol   Date Value Ref Range Status   06/22/2022 36 (L) >39 mg/dL Final   09/30/2020 47 (L) >49 mg/dL Final     LDL Cholesterol    Date Value Ref Range Status   09/30/2020 160 (H) 0 - 100 mg/dL Final     LDL Chol Calc (NIH)   Date Value Ref Range Status   06/22/2022 168 (H) 0 - 99 mg/dL Final     VLDL Cholesterol   Date Value Ref Range Status   09/30/2020 31 5 - 40 mg/dL Final     VLDL Cholesterol Meek   Date Value Ref Range Status   06/22/2022 28 5 - 40 mg/dL Final         Assessment and Plan {CC Problem List  Visit Diagnosis  ROS  Review (Popup)  Health Maintenance  Quality  BestPractice  Medications  SmartSets  SnapShot Encounters  Media :23  Diagnoses and all orders for this visit:    1. 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 And as directed to max of 50 units per day.  Dispense: 12 pen; Refill: 1    Increase Lantus 26 units at bedtime   Decrease Novolog breakfast base to 4 units then add sliding scale  Continue with sane Novolog with all other meals  Continue with metformin  mg BID  Change over to Dexcom from roman - insurance will not cover  I reviewed with pt what it means to be AISHWARYA and how if differs from type 2 diabetes.  Pt has seen diabetic educator and is scheduled to see dietician      2. Mixed hyperlipidemia    Continue with statin    3. Primary hypertension    Stable  Continue with current medication regimen  Defer management to PCP          Refills sent to pharmacy      RTC in 1 month with me      Follow Up      Patient was given instructions and counseling regarding her condition or for health maintenance advice. Please see specific information pulled into the AVS if appropriate.              JOSEPHINE Wayne  07/29/22

## 2022-07-26 ENCOUNTER — CLINICAL SUPPORT (OUTPATIENT)
Dept: FAMILY MEDICINE CLINIC | Facility: CLINIC | Age: 57
End: 2022-07-26

## 2022-07-26 DIAGNOSIS — Z23 IMMUNIZATION DUE: Primary | ICD-10-CM

## 2022-07-26 PROCEDURE — 90471 IMMUNIZATION ADMIN: CPT | Performed by: NURSE PRACTITIONER

## 2022-07-26 PROCEDURE — 90750 HZV VACC RECOMBINANT IM: CPT | Performed by: NURSE PRACTITIONER

## 2022-07-26 NOTE — PROGRESS NOTES
Immunization  Immunization performed in LD by Dian Carlos MA. Patient tolerated the procedure well without complications.  07/26/22   Dian Carlos MA

## 2022-07-27 ENCOUNTER — APPOINTMENT (OUTPATIENT)
Dept: DIABETES SERVICES | Facility: HOSPITAL | Age: 57
End: 2022-07-27

## 2022-07-27 ENCOUNTER — HOSPITAL ENCOUNTER (OUTPATIENT)
Dept: DIABETES SERVICES | Facility: HOSPITAL | Age: 57
Discharge: HOME | End: 2022-07-27
Admitting: INTERNAL MEDICINE

## 2022-07-27 PROCEDURE — G0108 DIAB MANAGE TRN  PER INDIV: HCPCS

## 2022-07-27 NOTE — NURSING NOTE
Diabetes Education    Patient Name:  Yuliana Moeller  YOB: 1965  MRN: [id number]  Admit Date:  7/27/2022      PATIENT was seen by/met with Diabetes Educator for Diabetes. Input short narrative of patient involvement in meeting.  Consistent with the ADA’s standards of care, a comprehensive assessment/training record has been sent to medical records to scan and associate with this encounter. PATIENT has been encouraged to call our office with questions or additional education needs. Please place referral for additional services or follow-up should need arise.     Discussed with pt and her  her Insulin therapy. Using Information sheet MD has given,we reviewed when and how to make insulin adjustments. We reviewed the Prasanna results on pts henrry. We discussed High/Lows and need to get a Glucagon kit at home.Covered sick day rules.  Pt not very active and very stressed with new diagnosis and busy job. Discussed importance of exercise and stress reduction.  Pt has not has eye exam in 2 years an is planning on making appointment soon.  Note sent to MA and CARMEN in my chart to look into pts insurance coverage of her Prasanna and getting a Glucagons kit ordered.    Thank you for the referral.          Electronically signed by:  Sharmin Carreon RN  07/27/22 13:07 EDT

## 2022-07-28 DIAGNOSIS — E13.9: Primary | ICD-10-CM

## 2022-07-29 ENCOUNTER — OFFICE VISIT (OUTPATIENT)
Dept: ENDOCRINOLOGY | Age: 57
End: 2022-07-29

## 2022-07-29 VITALS
HEIGHT: 59 IN | OXYGEN SATURATION: 99 % | HEART RATE: 77 BPM | SYSTOLIC BLOOD PRESSURE: 122 MMHG | BODY MASS INDEX: 33.47 KG/M2 | WEIGHT: 166 LBS | TEMPERATURE: 97.8 F | DIASTOLIC BLOOD PRESSURE: 80 MMHG

## 2022-07-29 DIAGNOSIS — I10 PRIMARY HYPERTENSION: ICD-10-CM

## 2022-07-29 DIAGNOSIS — E13.9: Primary | ICD-10-CM

## 2022-07-29 DIAGNOSIS — E78.2 MIXED HYPERLIPIDEMIA: ICD-10-CM

## 2022-07-29 PROCEDURE — 95251 CONT GLUC MNTR ANALYSIS I&R: CPT | Performed by: NURSE PRACTITIONER

## 2022-07-29 PROCEDURE — 99214 OFFICE O/P EST MOD 30 MIN: CPT | Performed by: NURSE PRACTITIONER

## 2022-07-29 NOTE — PATIENT INSTRUCTIONS
Increase Lantus 26 units at bedtime   Decrease Novolog breakfast base to 4 units then add sliding scale  Continue with sane Novolog with all other meals

## 2022-08-05 ENCOUNTER — PRIOR AUTHORIZATION (OUTPATIENT)
Dept: ENDOCRINOLOGY | Age: 57
End: 2022-08-05

## 2022-08-16 ENCOUNTER — TREATMENT (OUTPATIENT)
Dept: ENDOCRINOLOGY | Age: 57
End: 2022-08-16

## 2022-08-16 NOTE — PROGRESS NOTES
Reviewed CGM download    Called and talked to patient - decrease Lantus to 20 units daily.  She has been taking ~ 4 units with breakfast, 6 units with lunch and ~ 10 units with dinner.  Other lows look to be where her pre-meal BG is low 70 to low 100s, she takes the same amount of insulin and then goes low within a couple of hours after eating.  Advised that if that is the cause then try taking half the usual dose of her meal time insulin.

## 2022-08-29 NOTE — PROGRESS NOTES
Chief Complaint  Chief Complaint   Patient presents with   • Diabetes     Type1: Prasanna       Subjective          History of Present Illness    Yuliana Moeller 56 y.o. 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          Denies diarrhea, constipation, chest pain, shortness of breath, vision changes, numbness and tingling in feet/hands.    Weight gain of 3 lbs since last visit.    Last eye exam was over 1 year - pt is going to schedule     Pt does not have a history of nephropathy.  Patient is currently taking ACE     Pt does not have neuropathy.     Pt does not have a history of CAD or CVA.     Last A1C in 06/22 was >15.5     Last microalbumin in 07/22 was negative              Blood Sugars    Blood glucoses are checked via Prasanna.    Fasting blood glucoses: 60-mid 100s    Pre-meal blood glucoses: 60s - 200s    Pt has has some episodes of hypoglycemia due to taking too much insulin for the meal that she was eating          Sensor Data    Time in range 91%  High 9%  Very high 0%  Low 0%  Very low 0%      Average Glucose - 131 mg/dL      Sensor Wear - 94 %          Hyperlipidemia     Pt denies any muscle/body aches, chest pain, or shortness of breath    Pt is currently taking atorvastatin 40 mg HS     Last lipid panel in 06/22 showed Total 232, Triglycerides 151, HDL 36 and                 Hypertension    Pt denies any chest pain, palpitations, shortness of breath, or headache    Current regimen includes lisinopril 2.5 mg daily and HCTZ 12.5 mg daily             I have reviewed the patient's allergies,  "medicines, past medical hx, family hx and social hx.    Objective   Vital Signs:   /80   Pulse 79   Temp 98.4 °F (36.9 °C) (Temporal)   Ht 149.9 cm (59\")   Wt 77 kg (169 lb 12.8 oz)   SpO2 98%   BMI 34.30 kg/m²       Physical Exam   Physical Exam  Constitutional:       General: She is not in acute distress.     Appearance: Normal appearance. She is not diaphoretic.   HENT:      Head: Normocephalic and atraumatic.   Eyes:      General:         Right eye: No discharge.         Left eye: No discharge.   Skin:     General: Skin is warm and dry.   Neurological:      Mental Status: She is alert.   Psychiatric:         Mood and Affect: Mood normal.         Behavior: Behavior normal.                    Results Review:   Hemoglobin A1C   Date Value Ref Range Status   06/22/2022 >15.5 (H) 4.8 - 5.6 % Final     Comment:     **Verified by repeat analysis**           Prediabetes: 5.7 - 6.4           Diabetes: >6.4           Glycemic control for adults with diabetes: <7.0     05/26/2021 11.0 (H) 4.3 - 5.6 % Final     Comment:     (note)  A1C% Reference Range:  4.3 - 5.6  Normal range  5.7 - 6.4  Pre-diabetic -increased risk for developing diabetes mellitus.  >=6.5      Diabetic -diagnostic of diabetes mellitus.     Note: For diagnosis of diabetes in individuals without unequivocal   hyperglycemia, results should be confirmed by repeat testing.  Patients with conditions that shorten erythrocyte survival, such as  recovery from acute blood loss, hemolytic anemia, kidney disease,  or the presence of unstable hemoglobins like HbSS, HbCC, and HbSC  may yield falsely decreased HbA1c test results. Iron deficiency may  yield falsely increased HbA1c test results.     Triglycerides   Date Value Ref Range Status   06/22/2022 151 (H) 0 - 149 mg/dL Final   09/30/2020 156 (H) 0 - 149 mg/dL Final     HDL Cholesterol   Date Value Ref Range Status   06/22/2022 36 (L) >39 mg/dL Final   09/30/2020 47 (L) >49 mg/dL Final     LDL " Cholesterol    Date Value Ref Range Status   09/30/2020 160 (H) 0 - 100 mg/dL Final     LDL Chol Calc (NIH)   Date Value Ref Range Status   06/22/2022 168 (H) 0 - 99 mg/dL Final     VLDL Cholesterol   Date Value Ref Range Status   09/30/2020 31 5 - 40 mg/dL Final     VLDL Cholesterol Meek   Date Value Ref Range Status   06/22/2022 28 5 - 40 mg/dL Final         Assessment and Plan {CC Problem List  Visit Diagnosis  ROS  Review (Popup)  Health Maintenance  Quality  BestPractice  Medications  SmartSets  SnapShot Encounters  Media :23  Diagnoses and all orders for this visit:    1. Latent autoimmune diabetes mellitus in adult (AISHWARYA), managed as type 1 (HCC) (Primary)  -     Hemoglobin A1c; Future  -     Comprehensive Metabolic Panel; Future    Continue with Lantus 20 units at bedtime,  Continue with  Novolog 4 units before with breakfast, 6 units with lunch and 10 units with dinner except If pre-meal BG is 80 to low 100s, take half the usual dose of her meal time insulin.  Continue with metformin  mg BID  Need to make eye exam appointment  Continue with roman  Check labs prior to next visit        2. Mixed hyperlipidemia  -     Comprehensive Metabolic Panel; Future  -     Lipid Panel; Future    Continue with statin  Check labs prior to next visit        3. Primary hypertension  -     Comprehensive Metabolic Panel; Future    Stable  Continue with current medication regimen  Defer management to PCP        No refills needed at this time      RTC in 2 months with me, labs prior      Follow Up     Patient was given instructions and counseling regarding her condition or for health maintenance advice. Please see specific information pulled into the AVS if appropriate.              Zeny Branch, JOSEPHINE  08/30/22

## 2022-08-30 ENCOUNTER — OFFICE VISIT (OUTPATIENT)
Dept: ENDOCRINOLOGY | Age: 57
End: 2022-08-30

## 2022-08-30 VITALS
HEART RATE: 79 BPM | WEIGHT: 169.8 LBS | TEMPERATURE: 98.4 F | SYSTOLIC BLOOD PRESSURE: 120 MMHG | HEIGHT: 59 IN | BODY MASS INDEX: 34.23 KG/M2 | OXYGEN SATURATION: 98 % | DIASTOLIC BLOOD PRESSURE: 80 MMHG

## 2022-08-30 DIAGNOSIS — E78.2 MIXED HYPERLIPIDEMIA: ICD-10-CM

## 2022-08-30 DIAGNOSIS — I10 PRIMARY HYPERTENSION: ICD-10-CM

## 2022-08-30 DIAGNOSIS — E13.9: Primary | ICD-10-CM

## 2022-08-30 PROCEDURE — 99214 OFFICE O/P EST MOD 30 MIN: CPT | Performed by: NURSE PRACTITIONER

## 2022-08-30 PROCEDURE — 95251 CONT GLUC MNTR ANALYSIS I&R: CPT | Performed by: NURSE PRACTITIONER

## 2022-09-07 ENCOUNTER — HOSPITAL ENCOUNTER (OUTPATIENT)
Dept: DIABETES SERVICES | Facility: HOSPITAL | Age: 57
Discharge: HOME | End: 2022-09-07
Admitting: INTERNAL MEDICINE

## 2022-09-07 PROCEDURE — 97802 MEDICAL NUTRITION INDIV IN: CPT

## 2022-09-07 NOTE — CONSULTS
Mrs. Moeller was seen today by Registered Dietitian for Diabetes diet education. Consistent with the ADA’s standards of care, a comprehensive assessment/training record has been sent to medical records (see media tab).    Has been doing well since meeting with KADEN WALTON. Takes Lantus 20 units every night. Takes novolog at meals usually 4 at breakfast, 6 at lunch, and 10 at dinner. Wears Prasanna. 92% sensor usage. TIR 91% and time above 9% x 7 days. Discussed ADA goals. Carb counting 45 g meal 15-20 g pattern taught per MD instructions.     Mrs. Moeller has been encouraged to call our office with questions or additional education needs. Please place referral for additional services or follow-up should need arise.    Thank you for the referral.

## 2022-09-12 ENCOUNTER — TELEPHONE (OUTPATIENT)
Dept: FAMILY MEDICINE CLINIC | Facility: CLINIC | Age: 57
End: 2022-09-12

## 2022-09-12 RX ORDER — MECLIZINE HYDROCHLORIDE 25 MG/1
TABLET ORAL
Qty: 30 TABLET | Refills: 2 | Status: SHIPPED | OUTPATIENT
Start: 2022-09-12 | End: 2023-01-31

## 2022-09-12 NOTE — TELEPHONE ENCOUNTER
----- Message from Aixa Bergman MA sent at 9/9/2022  3:56 PM EDT -----  Regarding: FW: Vertigo    ----- Message -----  From: Yuliana Moeller  Sent: 9/9/2022   3:52 PM EDT  To: [facility]  Subject: Vertigo                                          Hello- I am experiencing extreme vertigo. Head is spinning and vomiting. Is there anything you can prescribe to help me out.  Never had it this bad. Thank you

## 2022-09-13 ENCOUNTER — PRE-PROCEDURE SCREENING (OUTPATIENT)
Dept: GASTROENTEROLOGY | Facility: CLINIC | Age: 57
End: 2022-09-13

## 2022-09-13 ENCOUNTER — OFFICE VISIT (OUTPATIENT)
Dept: FAMILY MEDICINE CLINIC | Facility: CLINIC | Age: 57
End: 2022-09-13

## 2022-09-13 VITALS
HEART RATE: 68 BPM | TEMPERATURE: 97.5 F | HEIGHT: 59 IN | WEIGHT: 167 LBS | OXYGEN SATURATION: 98 % | BODY MASS INDEX: 33.67 KG/M2 | RESPIRATION RATE: 16 BRPM | SYSTOLIC BLOOD PRESSURE: 118 MMHG | DIASTOLIC BLOOD PRESSURE: 78 MMHG

## 2022-09-13 DIAGNOSIS — R42 VERTIGO: Primary | ICD-10-CM

## 2022-09-13 PROCEDURE — 99213 OFFICE O/P EST LOW 20 MIN: CPT | Performed by: NURSE PRACTITIONER

## 2022-09-13 RX ORDER — PREDNISONE 20 MG/1
20 TABLET ORAL 2 TIMES DAILY
Qty: 14 TABLET | Refills: 0 | Status: SHIPPED | OUTPATIENT
Start: 2022-09-13 | End: 2023-01-31

## 2022-09-13 RX ORDER — PROMETHAZINE HYDROCHLORIDE 12.5 MG/1
TABLET ORAL
COMMUNITY
Start: 2022-09-10 | End: 2023-01-31

## 2022-09-13 NOTE — PROGRESS NOTES
Subjective   Yuliana Moeller is a 56 y.o. female.     History of Present Illness    Nausea (Started on Friday, nausea and vomiting have resolved but dizziness is about the same.  Eyes are fuzzy, can not make sudden moves, holding on to surfaces while walking)   Vomiting   Dizziness  Reports some ear and sinus pressure.  Also reports post nasal drainage.     The following portions of the patient's history were reviewed and updated as appropriate: allergies, current medications, past family history, past medical history, past social history, past surgical history and problem list.    Review of Systems   Constitutional: Negative for chills, fever and unexpected weight change.   Respiratory: Negative for shortness of breath.    Cardiovascular: Negative for chest pain and palpitations.   Gastrointestinal: Positive for nausea and vomiting.   Neurological: Positive for dizziness.   Psychiatric/Behavioral: Negative for behavioral problems.       Objective   Physical Exam  Vitals and nursing note reviewed.   Constitutional:       Appearance: Normal appearance. She is well-developed.   HENT:      Right Ear: Tympanic membrane, ear canal and external ear normal. Tympanic membrane is not injected, erythematous or retracted.      Left Ear: Tympanic membrane, ear canal and external ear normal. Tympanic membrane is not injected, erythematous or retracted.      Nose: Nose normal.      Mouth/Throat:      Pharynx: Posterior oropharyngeal erythema present. No pharyngeal swelling, oropharyngeal exudate or uvula swelling.   Cardiovascular:      Rate and Rhythm: Normal rate and regular rhythm.   Pulmonary:      Effort: Pulmonary effort is normal.      Breath sounds: Normal breath sounds.   Neurological:      Mental Status: She is alert and oriented to person, place, and time.   Psychiatric:         Mood and Affect: Mood normal.         Behavior: Behavior normal.         Thought Content: Thought content normal.         Judgment: Judgment  normal.         Assessment & Plan   Diagnoses and all orders for this visit:    1. Vertigo (Primary)  -     predniSONE (DELTASONE) 20 MG tablet; Take 1 tablet by mouth 2 (Two) Times a Day.  Dispense: 14 tablet; Refill: 0      Has meclizine at home but has not been taking.  Will restart.

## 2022-10-20 DIAGNOSIS — E13.9: ICD-10-CM

## 2022-10-21 ENCOUNTER — LAB (OUTPATIENT)
Dept: ENDOCRINOLOGY | Age: 57
End: 2022-10-21

## 2022-10-21 DIAGNOSIS — E78.2 MIXED HYPERLIPIDEMIA: ICD-10-CM

## 2022-10-21 DIAGNOSIS — I10 PRIMARY HYPERTENSION: ICD-10-CM

## 2022-10-21 DIAGNOSIS — E13.9: ICD-10-CM

## 2022-10-22 LAB
ALBUMIN SERPL-MCNC: 4.7 G/DL (ref 3.5–5.2)
ALBUMIN/GLOB SERPL: 2.1 G/DL
ALP SERPL-CCNC: 88 U/L (ref 39–117)
ALT SERPL-CCNC: 18 U/L (ref 1–33)
AST SERPL-CCNC: 17 U/L (ref 1–32)
BILIRUB SERPL-MCNC: 0.3 MG/DL (ref 0–1.2)
BUN SERPL-MCNC: 17 MG/DL (ref 6–20)
BUN/CREAT SERPL: 21.5 (ref 7–25)
CALCIUM SERPL-MCNC: 9.3 MG/DL (ref 8.6–10.5)
CHLORIDE SERPL-SCNC: 97 MMOL/L (ref 98–107)
CHOLEST SERPL-MCNC: 221 MG/DL (ref 0–200)
CO2 SERPL-SCNC: 30.3 MMOL/L (ref 22–29)
CREAT SERPL-MCNC: 0.79 MG/DL (ref 0.57–1)
EGFRCR SERPLBLD CKD-EPI 2021: 87.9 ML/MIN/1.73
GLOBULIN SER CALC-MCNC: 2.2 GM/DL
GLUCOSE SERPL-MCNC: 99 MG/DL (ref 65–99)
HBA1C MFR BLD: 7.5 % (ref 4.8–5.6)
HDLC SERPL-MCNC: 73 MG/DL (ref 40–60)
IMP & REVIEW OF LAB RESULTS: NORMAL
LDLC SERPL CALC-MCNC: 134 MG/DL (ref 0–100)
POTASSIUM SERPL-SCNC: 3.8 MMOL/L (ref 3.5–5.2)
PROT SERPL-MCNC: 6.9 G/DL (ref 6–8.5)
SODIUM SERPL-SCNC: 141 MMOL/L (ref 136–145)
TRIGL SERPL-MCNC: 79 MG/DL (ref 0–150)
VLDLC SERPL CALC-MCNC: 14 MG/DL (ref 5–40)

## 2022-10-31 ENCOUNTER — OFFICE VISIT (OUTPATIENT)
Dept: ENDOCRINOLOGY | Age: 57
End: 2022-10-31

## 2022-10-31 VITALS
HEART RATE: 79 BPM | DIASTOLIC BLOOD PRESSURE: 70 MMHG | BODY MASS INDEX: 36.12 KG/M2 | HEIGHT: 59 IN | TEMPERATURE: 96.9 F | WEIGHT: 179.2 LBS | OXYGEN SATURATION: 96 % | SYSTOLIC BLOOD PRESSURE: 122 MMHG

## 2022-10-31 DIAGNOSIS — E78.2 MIXED HYPERLIPIDEMIA: ICD-10-CM

## 2022-10-31 DIAGNOSIS — I10 PRIMARY HYPERTENSION: ICD-10-CM

## 2022-10-31 DIAGNOSIS — E13.9: Primary | ICD-10-CM

## 2022-10-31 PROCEDURE — 95251 CONT GLUC MNTR ANALYSIS I&R: CPT | Performed by: NURSE PRACTITIONER

## 2022-10-31 PROCEDURE — 99214 OFFICE O/P EST MOD 30 MIN: CPT | Performed by: NURSE PRACTITIONER

## 2022-10-31 RX ORDER — ATORVASTATIN CALCIUM 80 MG/1
80 TABLET, FILM COATED ORAL NIGHTLY
Qty: 90 TABLET | Refills: 1 | Status: SHIPPED | OUTPATIENT
Start: 2022-10-31

## 2022-10-31 NOTE — PROGRESS NOTES
Chief Complaint  Chief Complaint   Patient presents with   • Diabetes     Type 1: Pt roman is attached, is up to date on eye exam, no hx of retinopathy or neuropathy, does have tingling in hands.        Subjective          History of Present Illness    Yuliana Moeller 57 y.o. 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)     Patient was diagnosed around 2017 and started insulin 07/22  WILLIS positive in 07/22     Pt is on the following medications for their DM: Lantus 20 units at bedtime, Novolog 4 units before with breakfast, 6 units with lunch and 10 units with dinner and metformin  mg daily    For getting night Metformin        If pre-meal BG is 80 to low 100s, take half the usual dose of her meal time insulin.        Pt complains of numbness and tingling in left hand    Denies diarrhea, constipation, chest pain, shortness of breath, vision changes or numbness and tingling in feet/hands.    Weight gain of 10 lbs since last visit.    Last eye exam was 10/22 - no retinopathy      Pt does not have a history of nephropathy.  Patient is currently taking ACE     Pt does not have neuropathy.     Pt does not have a history of CAD or CVA.     Last A1C in 10/22 was 7.5     Last microalbumin in 07/22 was negative          Blood Sugars    Blood glucoses are checked via roman.    Fasting blood glucoses: mid 100s    Pre-meal blood glucoses: mid to high 100s    Pt has rare episodes of hypoglycemia - low was due to changing sensor          Sensor Data    Time in range 86%  High 14%  Very high 0%  Low 0%  Very low 0%      Average Glucose - 139 mg/dL      Sensor Wear - 87 %             Hyperlipidemia     Pt denies any muscle/body aches, chest pain, or shortness of breath    Pt is currently taking atorvastatin 40 mg HS    Last lipid panel in 10/22 showed Total 221, Triglycerides 79, HDL 73 and             Hypertension    Pt denies any chest pain, palpitations, shortness of  "breath, or headache    Current regimen includes  lisinopril 2.5 mg daily and HCTZ 12.5 mg daily             I have reviewed the patient's allergies, medicines, past medical hx, family hx and social hx.    Objective   Vital Signs:   /70   Pulse 79   Temp 96.9 °F (36.1 °C) (Temporal)   Ht 149.9 cm (59.02\")   Wt 81.3 kg (179 lb 3.2 oz)   SpO2 96%   BMI 36.17 kg/m²       Physical Exam   Physical Exam  Constitutional:       General: She is not in acute distress.     Appearance: Normal appearance. She is not diaphoretic.   HENT:      Head: Normocephalic and atraumatic.   Eyes:      General:         Right eye: No discharge.         Left eye: No discharge.   Skin:     General: Skin is warm and dry.   Neurological:      Mental Status: She is alert.   Psychiatric:         Mood and Affect: Mood normal.         Behavior: Behavior normal.                    Results Review:   Hemoglobin A1C   Date Value Ref Range Status   10/21/2022 7.50 (H) 4.80 - 5.60 % Final     Comment:     Hemoglobin A1C Ranges:  Increased Risk for Diabetes  5.7% to 6.4%  Diabetes                     >= 6.5%  Diabetic Goal                < 7.0%     05/26/2021 11.0 (H) 4.3 - 5.6 % Final     Comment:     (note)  A1C% Reference Range:  4.3 - 5.6  Normal range  5.7 - 6.4  Pre-diabetic -increased risk for developing diabetes mellitus.  >=6.5      Diabetic -diagnostic of diabetes mellitus.     Note: For diagnosis of diabetes in individuals without unequivocal   hyperglycemia, results should be confirmed by repeat testing.  Patients with conditions that shorten erythrocyte survival, such as  recovery from acute blood loss, hemolytic anemia, kidney disease,  or the presence of unstable hemoglobins like HbSS, HbCC, and HbSC  may yield falsely decreased HbA1c test results. Iron deficiency may  yield falsely increased HbA1c test results.     Triglycerides   Date Value Ref Range Status   10/21/2022 79 0 - 150 mg/dL Final   09/30/2020 156 (H) 0 - 149 mg/dL " Final     HDL Cholesterol   Date Value Ref Range Status   10/21/2022 73 (H) 40 - 60 mg/dL Final   09/30/2020 47 (L) >49 mg/dL Final     LDL Cholesterol    Date Value Ref Range Status   09/30/2020 160 (H) 0 - 100 mg/dL Final     LDL Chol Calc (NIH)   Date Value Ref Range Status   10/21/2022 134 (H) 0 - 100 mg/dL Final     VLDL Cholesterol   Date Value Ref Range Status   09/30/2020 31 5 - 40 mg/dL Final     VLDL Cholesterol Meek   Date Value Ref Range Status   10/21/2022 14 5 - 40 mg/dL Final         Assessment and Plan {CC Problem List  Visit Diagnosis  ROS  Review (Popup)  Health Maintenance  Quality  BestPractice  Medications  SmartSets  SnapShot Encounters  Media :23  Diagnoses and all orders for this visit:    1. 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   Continue with Lantus 20 units at bedtime, Novolog 4 units before with breakfast, 6 units with lunch and 10 units with dinner and   Can taken metformin  mg 2 tablets daily - to get second dose since missing night time  If eating bigger dinner then increase to Novolog 12 units  Continue with roman - Dexcom too expensive  Check labs prior to next visit       2. Mixed hyperlipidemia  -     atorvastatin (LIPITOR) 80 MG tablet; Take 1 tablet by mouth Every Night.  Dispense: 90 tablet; Refill: 1  -     Comprehensive Metabolic Panel; Future  -     Lipid Panel; Future    Total cholesterol and LDL elevated  Increase atorvastatin to 80 mg HS  Check labs prior to next visit      3. Primary hypertension  -     Comprehensive Metabolic Panel; Future    Stable  Continue with current medication regimen  Defer management to PCP        No refills needed at this time      RTC in 3-4 months with me, labs prior      Follow Up     Patient was given instructions and counseling regarding her condition or for health maintenance  advice. Please see specific information pulled into the AVS if appropriate.              Zeny Branch, APRN  10/31/22

## 2022-11-16 DIAGNOSIS — E11.65 TYPE 2 DIABETES MELLITUS WITH HYPERGLYCEMIA, WITHOUT LONG-TERM CURRENT USE OF INSULIN: ICD-10-CM

## 2022-11-16 DIAGNOSIS — E78.2 MIXED HYPERLIPIDEMIA: ICD-10-CM

## 2022-11-16 DIAGNOSIS — F32.A ANXIETY AND DEPRESSION: ICD-10-CM

## 2022-11-16 DIAGNOSIS — F41.9 ANXIETY AND DEPRESSION: ICD-10-CM

## 2022-11-16 DIAGNOSIS — E53.8 B12 DEFICIENCY: ICD-10-CM

## 2022-11-16 DIAGNOSIS — Z86.2 H/O THROMBOCYTOSIS: ICD-10-CM

## 2022-11-16 NOTE — TELEPHONE ENCOUNTER
Rx Refill Note  Requested Prescriptions     Pending Prescriptions Disp Refills   • sertraline (ZOLOFT) 50 MG tablet [Pharmacy Med Name: SERTRALINE HCL 50 MG Tablet] 90 tablet      Sig: TAKE 1 TABLET EVERY DAY      Last office visit with prescribing clinician: 6/16/2022  Next office visit with prescribing clinician: Visit date not found

## 2022-11-17 ENCOUNTER — TELEPHONE (OUTPATIENT)
Dept: FAMILY MEDICINE CLINIC | Facility: CLINIC | Age: 57
End: 2022-11-17

## 2022-11-17 DIAGNOSIS — F41.9 ANXIETY AND DEPRESSION: ICD-10-CM

## 2022-11-17 DIAGNOSIS — E78.2 MIXED HYPERLIPIDEMIA: ICD-10-CM

## 2022-11-17 DIAGNOSIS — E53.8 B12 DEFICIENCY: ICD-10-CM

## 2022-11-17 DIAGNOSIS — Z86.2 H/O THROMBOCYTOSIS: ICD-10-CM

## 2022-11-17 DIAGNOSIS — F32.A ANXIETY AND DEPRESSION: ICD-10-CM

## 2022-11-17 DIAGNOSIS — E11.65 TYPE 2 DIABETES MELLITUS WITH HYPERGLYCEMIA, WITHOUT LONG-TERM CURRENT USE OF INSULIN: ICD-10-CM

## 2022-11-17 RX ORDER — SERTRALINE HYDROCHLORIDE 100 MG/1
100 TABLET, FILM COATED ORAL DAILY
Qty: 90 TABLET | Refills: 1 | Status: SHIPPED | OUTPATIENT
Start: 2022-11-17

## 2022-12-05 DIAGNOSIS — Z79.4 TYPE 2 DIABETES MELLITUS WITH HYPERGLYCEMIA, WITH LONG-TERM CURRENT USE OF INSULIN: ICD-10-CM

## 2022-12-05 DIAGNOSIS — E11.65 TYPE 2 DIABETES MELLITUS WITH HYPERGLYCEMIA, WITH LONG-TERM CURRENT USE OF INSULIN: ICD-10-CM

## 2022-12-05 DIAGNOSIS — E13.9: Primary | ICD-10-CM

## 2022-12-05 RX ORDER — INSULIN ASPART 100 [IU]/ML
INJECTION, SOLUTION INTRAVENOUS; SUBCUTANEOUS
Qty: 15 ML | Refills: 6 | Status: SHIPPED | OUTPATIENT
Start: 2022-12-05 | End: 2022-12-05

## 2022-12-05 RX ORDER — INSULIN ASPART 100 [IU]/ML
4-10 INJECTION, SOLUTION INTRAVENOUS; SUBCUTANEOUS
Qty: 27 ML | Refills: 1 | Status: SHIPPED | OUTPATIENT
Start: 2022-12-05

## 2022-12-05 RX ORDER — INSULIN ASPART 100 [IU]/ML
INJECTION, SOLUTION INTRAVENOUS; SUBCUTANEOUS
Qty: 15 ML | Refills: 6 | Status: CANCELLED | OUTPATIENT
Start: 2022-12-05

## 2023-01-24 ENCOUNTER — LAB (OUTPATIENT)
Dept: ENDOCRINOLOGY | Age: 58
End: 2023-01-24
Payer: COMMERCIAL

## 2023-01-24 DIAGNOSIS — E78.2 MIXED HYPERLIPIDEMIA: ICD-10-CM

## 2023-01-24 DIAGNOSIS — I10 PRIMARY HYPERTENSION: ICD-10-CM

## 2023-01-24 DIAGNOSIS — E13.9: ICD-10-CM

## 2023-01-25 LAB
ALBUMIN SERPL-MCNC: 4.6 G/DL (ref 3.5–5.2)
ALBUMIN/GLOB SERPL: 1.8 G/DL
ALP SERPL-CCNC: 108 U/L (ref 39–117)
ALT SERPL-CCNC: 17 U/L (ref 1–33)
AST SERPL-CCNC: 14 U/L (ref 1–32)
BILIRUB SERPL-MCNC: 0.3 MG/DL (ref 0–1.2)
BUN SERPL-MCNC: 15 MG/DL (ref 6–20)
BUN/CREAT SERPL: 17 (ref 7–25)
CALCIUM SERPL-MCNC: 9.7 MG/DL (ref 8.6–10.5)
CHLORIDE SERPL-SCNC: 99 MMOL/L (ref 98–107)
CHOLEST SERPL-MCNC: 217 MG/DL (ref 0–200)
CO2 SERPL-SCNC: 29.7 MMOL/L (ref 22–29)
CREAT SERPL-MCNC: 0.88 MG/DL (ref 0.57–1)
EGFRCR SERPLBLD CKD-EPI 2021: 77.2 ML/MIN/1.73
GLOBULIN SER CALC-MCNC: 2.6 GM/DL
GLUCOSE SERPL-MCNC: 183 MG/DL (ref 65–99)
HBA1C MFR BLD: 7.3 % (ref 4.8–5.6)
HDLC SERPL-MCNC: 65 MG/DL (ref 40–60)
IMP & REVIEW OF LAB RESULTS: NORMAL
LDLC SERPL CALC-MCNC: 136 MG/DL (ref 0–100)
POTASSIUM SERPL-SCNC: 4.3 MMOL/L (ref 3.5–5.2)
PROT SERPL-MCNC: 7.2 G/DL (ref 6–8.5)
SODIUM SERPL-SCNC: 138 MMOL/L (ref 136–145)
TRIGL SERPL-MCNC: 90 MG/DL (ref 0–150)
VLDLC SERPL CALC-MCNC: 16 MG/DL (ref 5–40)

## 2023-01-31 ENCOUNTER — OFFICE VISIT (OUTPATIENT)
Dept: ENDOCRINOLOGY | Age: 58
End: 2023-01-31
Payer: COMMERCIAL

## 2023-01-31 VITALS
HEART RATE: 75 BPM | BODY MASS INDEX: 37.54 KG/M2 | TEMPERATURE: 96.8 F | HEIGHT: 59 IN | OXYGEN SATURATION: 98 % | SYSTOLIC BLOOD PRESSURE: 120 MMHG | DIASTOLIC BLOOD PRESSURE: 82 MMHG | WEIGHT: 186.2 LBS

## 2023-01-31 DIAGNOSIS — E78.2 MIXED HYPERLIPIDEMIA: ICD-10-CM

## 2023-01-31 DIAGNOSIS — I10 PRIMARY HYPERTENSION: ICD-10-CM

## 2023-01-31 DIAGNOSIS — E13.9: Primary | ICD-10-CM

## 2023-01-31 LAB — GLUCOSE BLDC GLUCOMTR-MCNC: 184 MG/DL (ref 70–130)

## 2023-01-31 PROCEDURE — 95251 CONT GLUC MNTR ANALYSIS I&R: CPT | Performed by: NURSE PRACTITIONER

## 2023-01-31 PROCEDURE — 99214 OFFICE O/P EST MOD 30 MIN: CPT | Performed by: NURSE PRACTITIONER

## 2023-01-31 PROCEDURE — 82962 GLUCOSE BLOOD TEST: CPT | Performed by: NURSE PRACTITIONER

## 2023-01-31 NOTE — PATIENT INSTRUCTIONS
Continue with Lantus 20 units at bedtime  Continue with Novolog 4-6 units before with breakfast, 6-8 units with lunch and 10-12 units with dinner   Start taking Novolog 2-4 units with snack  Continue with metformin  mg 1 tablet daily

## 2023-01-31 NOTE — PROGRESS NOTES
Chief Complaint  Chief Complaint   Patient presents with   • Diabetes       Subjective          History of Present Illness    Yuliana Moeller 57 y.o. 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)     Patient was diagnosed around 2017 and started insulin 07/22  WILLIS positive in 07/22     Pt is on the following medications for their DM: Lantus 20 units at bedtime, Novolog 4-6 units before with breakfast, 6-8 units with lunch and 10-12 units with dinner and metformin  mg 1 tablet daily        If pre-meal BG is 80 to low 100s, take half the usual dose of her meal time insulin.           Pt complains of numbness and tingling in left hand    Denies diarrhea, constipation, chest pain, shortness of breath, vision changes or numbness and tingling in feet.    Weight gain of 7 lbs since last visit.    Pt does not have a history of DM retinopathy.  Last eye exam was 10/22    Pt does not have a history of nephropathy.  Patient is currently taking ACE     Pt does not have neuropathy.     Pt does not have a history of CAD or CVA.    Last A1C in 01/23 was 7.3    Last microalbumin in 07/22 was negative          Blood Sugars    Blood glucoses are checked via roman    Fasting blood glucoses: low 100s - high 100s    Pre-meal blood glucoses: 80s - high 100s    Pt has rare episodes of hypoglycemia.          Sensor Data    Time in range 88%  High 10%  Very high 1%  Low 1%  Very low 0%      Average Glucose - 140 mg/dL      Sensor Wear - 75 %             Hyperlipidemia     Pt denies any muscle/body aches, chest pain, or shortness of breath    Pt is currently taking atorvastatin 80 mg HS - thinking that she may still be taking 40 mg, she is going to check and let me know    Last lipid panel in 01/23 showed Total 217, Triglycerides 90, HDL 65 and             Hypertension    Pt denies any chest pain, palpitations, shortness of breath, or headache    Current regimen includes lisinopril  "2.5 mg daily and HCTZ 12.5 mg daily             I have reviewed the patient's allergies, medicines, past medical hx, family hx and social hx.    Objective   Vital Signs:   /82   Pulse 75   Temp 96.8 °F (36 °C)   Ht 149.9 cm (59.02\")   Wt 84.5 kg (186 lb 3.2 oz)   SpO2 98%   BMI 37.59 kg/m²       Physical Exam   Physical Exam  Constitutional:       General: She is not in acute distress.     Appearance: Normal appearance. She is not diaphoretic.   HENT:      Head: Normocephalic and atraumatic.   Eyes:      General:         Right eye: No discharge.         Left eye: No discharge.   Skin:     General: Skin is warm and dry.   Neurological:      Mental Status: She is alert.   Psychiatric:         Mood and Affect: Mood normal.         Behavior: Behavior normal.                    Results Review:   Hemoglobin A1C   Date Value Ref Range Status   01/24/2023 7.30 (H) 4.80 - 5.60 % Final     Comment:     Hemoglobin A1C Ranges:  Increased Risk for Diabetes  5.7% to 6.4%  Diabetes                     >= 6.5%  Diabetic Goal                < 7.0%     05/26/2021 11.0 (H) 4.3 - 5.6 % Final     Comment:     (note)  A1C% Reference Range:  4.3 - 5.6  Normal range  5.7 - 6.4  Pre-diabetic -increased risk for developing diabetes mellitus.  >=6.5      Diabetic -diagnostic of diabetes mellitus.     Note: For diagnosis of diabetes in individuals without unequivocal   hyperglycemia, results should be confirmed by repeat testing.  Patients with conditions that shorten erythrocyte survival, such as  recovery from acute blood loss, hemolytic anemia, kidney disease,  or the presence of unstable hemoglobins like HbSS, HbCC, and HbSC  may yield falsely decreased HbA1c test results. Iron deficiency may  yield falsely increased HbA1c test results.     Triglycerides   Date Value Ref Range Status   01/24/2023 90 0 - 150 mg/dL Final   09/30/2020 156 (H) 0 - 149 mg/dL Final     HDL Cholesterol   Date Value Ref Range Status   01/24/2023 65 (H) " 40 - 60 mg/dL Final   09/30/2020 47 (L) >49 mg/dL Final     LDL Cholesterol    Date Value Ref Range Status   09/30/2020 160 (H) 0 - 100 mg/dL Final     LDL Chol Calc (NIH)   Date Value Ref Range Status   01/24/2023 136 (H) 0 - 100 mg/dL Final     VLDL Cholesterol   Date Value Ref Range Status   09/30/2020 31 5 - 40 mg/dL Final     VLDL Cholesterol Meek   Date Value Ref Range Status   01/24/2023 16 5 - 40 mg/dL Final         Assessment and Plan {CC Problem List  Visit Diagnosis  ROS  Review (Popup)  Health Maintenance  Quality  BestPractice  Medications  SmartSets  SnapShot Encounters  Media :23  Diagnoses and all orders for this visit:    1. Latent autoimmune diabetes mellitus in adult (AISHWARYA), managed as type 1 (HCC) (Primary)  -     POC Glucose Fingerstick  -     Hemoglobin A1c; Future  -     Comprehensive Metabolic Panel; Future  -     Microalbumin / Creatinine Urine Ratio - Urine, Clean Catch; Future    Continue with Lantus 20 units at bedtime  Continue with Novolog 4-6 units before with breakfast, 6-8 units with lunch and 10-12 units with dinner   Start taking Novolog 2-4 units with snack  Continue with metformin  mg 1 tablet daily  Continue with roman        2. Mixed hyperlipidemia  -     Comprehensive Metabolic Panel; Future  -     Lipid Panel; Future      Total cholesterol and LDL elevated  thinking that she may still be taking atorvastatin 40 mg, she is going to check and let me know  May need to think about PSCK9 inhibitors in the future      3. Primary hypertension  -     Comprehensive Metabolic Panel; Future    Stable  Continue with current medication regimen  Defer management to PCP          RTC in 3 months with me, labs prior        Follow Up     Patient was given instructions and counseling regarding her condition or for health maintenance advice. Please see specific information pulled into the AVS if appropriate.              Zeny Branch, APRN  01/31/23

## 2023-02-22 DIAGNOSIS — F41.9 ANXIETY AND DEPRESSION: ICD-10-CM

## 2023-02-22 DIAGNOSIS — Z86.2 H/O THROMBOCYTOSIS: ICD-10-CM

## 2023-02-22 DIAGNOSIS — E78.2 MIXED HYPERLIPIDEMIA: ICD-10-CM

## 2023-02-22 DIAGNOSIS — F32.A ANXIETY AND DEPRESSION: ICD-10-CM

## 2023-02-22 DIAGNOSIS — E53.8 B12 DEFICIENCY: ICD-10-CM

## 2023-02-22 DIAGNOSIS — E11.65 TYPE 2 DIABETES MELLITUS WITH HYPERGLYCEMIA, WITHOUT LONG-TERM CURRENT USE OF INSULIN: ICD-10-CM

## 2023-02-23 DIAGNOSIS — E78.2 MIXED HYPERLIPIDEMIA: ICD-10-CM

## 2023-02-23 DIAGNOSIS — F41.9 ANXIETY AND DEPRESSION: ICD-10-CM

## 2023-02-23 DIAGNOSIS — Z86.2 H/O THROMBOCYTOSIS: ICD-10-CM

## 2023-02-23 DIAGNOSIS — E53.8 B12 DEFICIENCY: ICD-10-CM

## 2023-02-23 DIAGNOSIS — E11.65 TYPE 2 DIABETES MELLITUS WITH HYPERGLYCEMIA, WITHOUT LONG-TERM CURRENT USE OF INSULIN: ICD-10-CM

## 2023-02-23 DIAGNOSIS — F32.A ANXIETY AND DEPRESSION: ICD-10-CM

## 2023-02-23 RX ORDER — METFORMIN HYDROCHLORIDE 500 MG/1
TABLET, EXTENDED RELEASE ORAL
Qty: 30 TABLET | Refills: 0 | Status: SHIPPED | OUTPATIENT
Start: 2023-02-23

## 2023-02-23 RX ORDER — HYDROCHLOROTHIAZIDE 12.5 MG/1
CAPSULE, GELATIN COATED ORAL
Qty: 30 CAPSULE | Refills: 0 | Status: SHIPPED | OUTPATIENT
Start: 2023-02-23

## 2023-02-23 RX ORDER — LISINOPRIL 2.5 MG/1
TABLET ORAL
Qty: 30 TABLET | Refills: 0 | Status: SHIPPED | OUTPATIENT
Start: 2023-02-23

## 2023-04-20 ENCOUNTER — LAB (OUTPATIENT)
Dept: ENDOCRINOLOGY | Age: 58
End: 2023-04-20
Payer: COMMERCIAL

## 2023-04-20 DIAGNOSIS — E13.9: ICD-10-CM

## 2023-04-20 DIAGNOSIS — I10 PRIMARY HYPERTENSION: ICD-10-CM

## 2023-04-20 DIAGNOSIS — E78.2 MIXED HYPERLIPIDEMIA: ICD-10-CM

## 2023-04-21 LAB
ALBUMIN SERPL-MCNC: 4.9 G/DL (ref 3.5–5.2)
ALBUMIN/CREAT UR: <13 MG/G CREAT (ref 0–29)
ALBUMIN/GLOB SERPL: 1.7 G/DL
ALP SERPL-CCNC: 107 U/L (ref 39–117)
ALT SERPL-CCNC: 24 U/L (ref 1–33)
AST SERPL-CCNC: 18 U/L (ref 1–32)
BILIRUB SERPL-MCNC: 0.4 MG/DL (ref 0–1.2)
BUN SERPL-MCNC: 13 MG/DL (ref 6–20)
BUN/CREAT SERPL: 14.6 (ref 7–25)
CALCIUM SERPL-MCNC: 10.3 MG/DL (ref 8.6–10.5)
CHLORIDE SERPL-SCNC: 99 MMOL/L (ref 98–107)
CHOLEST SERPL-MCNC: 219 MG/DL (ref 0–200)
CO2 SERPL-SCNC: 28.3 MMOL/L (ref 22–29)
CREAT SERPL-MCNC: 0.89 MG/DL (ref 0.57–1)
CREAT UR-MCNC: 23.7 MG/DL
EGFRCR SERPLBLD CKD-EPI 2021: 76.2 ML/MIN/1.73
GLOBULIN SER CALC-MCNC: 2.9 GM/DL
GLUCOSE SERPL-MCNC: 198 MG/DL (ref 65–99)
HBA1C MFR BLD: 7.7 % (ref 4.8–5.6)
HDLC SERPL-MCNC: 61 MG/DL (ref 40–60)
IMP & REVIEW OF LAB RESULTS: NORMAL
LDLC SERPL CALC-MCNC: 131 MG/DL (ref 0–100)
MICROALBUMIN UR-MCNC: <3 UG/ML
POTASSIUM SERPL-SCNC: 4 MMOL/L (ref 3.5–5.2)
PROT SERPL-MCNC: 7.8 G/DL (ref 6–8.5)
SODIUM SERPL-SCNC: 138 MMOL/L (ref 136–145)
TRIGL SERPL-MCNC: 154 MG/DL (ref 0–150)
VLDLC SERPL CALC-MCNC: 27 MG/DL (ref 5–40)

## 2023-04-22 DIAGNOSIS — F41.9 ANXIETY AND DEPRESSION: ICD-10-CM

## 2023-04-22 DIAGNOSIS — E53.8 B12 DEFICIENCY: ICD-10-CM

## 2023-04-22 DIAGNOSIS — E78.2 MIXED HYPERLIPIDEMIA: ICD-10-CM

## 2023-04-22 DIAGNOSIS — E11.65 TYPE 2 DIABETES MELLITUS WITH HYPERGLYCEMIA, WITHOUT LONG-TERM CURRENT USE OF INSULIN: ICD-10-CM

## 2023-04-22 DIAGNOSIS — Z86.2 H/O THROMBOCYTOSIS: ICD-10-CM

## 2023-04-22 DIAGNOSIS — F32.A ANXIETY AND DEPRESSION: ICD-10-CM

## 2023-04-24 RX ORDER — METFORMIN HYDROCHLORIDE 500 MG/1
TABLET, EXTENDED RELEASE ORAL
Qty: 15 TABLET | Refills: 0 | Status: SHIPPED | OUTPATIENT
Start: 2023-04-24 | End: 2023-04-27 | Stop reason: SDUPTHER

## 2023-04-27 ENCOUNTER — OFFICE VISIT (OUTPATIENT)
Dept: ENDOCRINOLOGY | Age: 58
End: 2023-04-27
Payer: COMMERCIAL

## 2023-04-27 VITALS
BODY MASS INDEX: 39.72 KG/M2 | TEMPERATURE: 97.5 F | OXYGEN SATURATION: 99 % | DIASTOLIC BLOOD PRESSURE: 80 MMHG | HEART RATE: 77 BPM | SYSTOLIC BLOOD PRESSURE: 122 MMHG | WEIGHT: 197 LBS | HEIGHT: 59 IN

## 2023-04-27 DIAGNOSIS — I10 PRIMARY HYPERTENSION: ICD-10-CM

## 2023-04-27 DIAGNOSIS — E78.2 MIXED HYPERLIPIDEMIA: ICD-10-CM

## 2023-04-27 DIAGNOSIS — E13.9: Primary | ICD-10-CM

## 2023-04-27 RX ORDER — SEMAGLUTIDE 0.68 MG/ML
.25-.5 INJECTION, SOLUTION SUBCUTANEOUS WEEKLY
Qty: 9 ML | Refills: 1 | Status: SHIPPED | OUTPATIENT
Start: 2023-04-27

## 2023-04-27 RX ORDER — ATORVASTATIN CALCIUM 80 MG/1
80 TABLET, FILM COATED ORAL NIGHTLY
Qty: 90 TABLET | Refills: 1 | Status: SHIPPED | OUTPATIENT
Start: 2023-04-27

## 2023-04-27 RX ORDER — LISINOPRIL 2.5 MG/1
2.5 TABLET ORAL DAILY
Qty: 90 TABLET | Refills: 1 | Status: SHIPPED | OUTPATIENT
Start: 2023-04-27

## 2023-04-27 RX ORDER — INSULIN ASPART 100 [IU]/ML
INJECTION, SOLUTION INTRAVENOUS; SUBCUTANEOUS
Qty: 54 ML | Refills: 1 | Status: SHIPPED | OUTPATIENT
Start: 2023-04-27

## 2023-04-27 RX ORDER — METFORMIN HYDROCHLORIDE 500 MG/1
500 TABLET, EXTENDED RELEASE ORAL
Qty: 90 TABLET | Refills: 1 | Status: SHIPPED | OUTPATIENT
Start: 2023-04-27

## 2023-04-27 NOTE — PROGRESS NOTES
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    Pt does not have a history of DM retinopathy.  Last eye exam was 10/22     Pt does not have a history of nephropathy.  Patient is currently taking ACE     Pt does not have neuropathy.     Pt does not have a history of CAD or CVA.    Last A1C in 04/23 was 7.70    Last microalbumin in 04/23 was negative          Blood Sugars    Blood glucoses are checked via roman    Fasting blood glucoses: mid to high 100s    Pre-meal blood glucoses: high 100s    Pt has couple episodes of hypoglycemia due to over treatment of high BGs and/or being active            Sensor Data    Time in range 65%  High 31%  Very high 4%  Low 0%  Very low 0%      Average Glucose - 164 mg/dL      Sensor Wear - 73 %            Hyperlipidemia     Pt denies any muscle/body aches, chest pain or shortness of breath    Pt is currently taking 80 mg HS - just started a little bit ago    Last lipid panel in 04/23 showed Total 219, HDL 61,  and Triglycerides 154            Hypertension    Pt denies any chest pain, palpitations, shortness of breath or headache    Current regimen includes lisinopril 2.5  "mg daily and HCTZ 12.5 mg daily             I have reviewed the patient's allergies, medicines, past medical hx, family hx and social hx.    Objective   Vital Signs:   /80   Pulse 77   Temp 97.5 °F (36.4 °C) (Temporal)   Ht 149.9 cm (59\")   Wt 89.4 kg (197 lb)   SpO2 99%   BMI 39.79 kg/m²       Physical Exam   Physical Exam  Constitutional:       General: She is not in acute distress.     Appearance: Normal appearance. She is not diaphoretic.   HENT:      Head: Normocephalic and atraumatic.   Eyes:      General:         Right eye: No discharge.         Left eye: No discharge.   Skin:     General: Skin is warm and dry.   Neurological:      Mental Status: She is alert.   Psychiatric:         Mood and Affect: Mood normal.         Behavior: Behavior normal.                    Results Review:   Hemoglobin A1C   Date Value Ref Range Status   04/20/2023 7.70 (H) 4.80 - 5.60 % Final     Comment:     Hemoglobin A1C Ranges:  Increased Risk for Diabetes  5.7% to 6.4%  Diabetes                     >= 6.5%  Diabetic Goal                < 7.0%     05/26/2021 11.0 (H) 4.3 - 5.6 % Final     Comment:     (note)  A1C% Reference Range:  4.3 - 5.6  Normal range  5.7 - 6.4  Pre-diabetic -increased risk for developing diabetes mellitus.  >=6.5      Diabetic -diagnostic of diabetes mellitus.     Note: For diagnosis of diabetes in individuals without unequivocal   hyperglycemia, results should be confirmed by repeat testing.  Patients with conditions that shorten erythrocyte survival, such as  recovery from acute blood loss, hemolytic anemia, kidney disease,  or the presence of unstable hemoglobins like HbSS, HbCC, and HbSC  may yield falsely decreased HbA1c test results. Iron deficiency may  yield falsely increased HbA1c test results.     Triglycerides   Date Value Ref Range Status   04/20/2023 154 (H) 0 - 150 mg/dL Final   09/30/2020 156 (H) 0 - 149 mg/dL Final     HDL Cholesterol   Date Value Ref Range Status   04/20/2023 61 " (H) 40 - 60 mg/dL Final   09/30/2020 47 (L) >49 mg/dL Final     LDL Cholesterol    Date Value Ref Range Status   09/30/2020 160 (H) 0 - 100 mg/dL Final     LDL Chol Calc (NIH)   Date Value Ref Range Status   04/20/2023 131 (H) 0 - 100 mg/dL Final     VLDL Cholesterol   Date Value Ref Range Status   09/30/2020 31 5 - 40 mg/dL Final     VLDL Cholesterol Meek   Date Value Ref Range Status   04/20/2023 27 5 - 40 mg/dL Final         Assessment and Plan {CC Problem List  Visit Diagnosis  ROS  Review (Popup)  Health Maintenance  Quality  BestPractice  Medications  SmartSets  SnapShot Encounters  Media :23  Diagnoses and all orders for this visit:    1. 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 Inject 0.25 mg weekly for four weeks, then increase to 0.5 mg weekly  Dispense: 9 mL; Refill: 1  -     Insulin Glargine (LANTUS SOLOSTAR) 100 UNIT/ML injection pen; Inject 26-40 Units under the skin into the appropriate area as directed Every Night. And as directed to max of 50 units per day.  Dispense: 15 mL; Refill: 2  -     insulin aspart (NovoLOG FlexPen) 100 UNIT/ML solution pen-injector sc pen; Carb ratio 1 unit for every 10 grams of carbs plus 1 unit for every 30 over 150.  Max daily dose of 60 units  Dispense: 54 mL; Refill: 1  -     Hemoglobin A1c; Future  -     Comprehensive Metabolic Panel; Future  -     TSH Rfx On Abnormal To Free T4; Future    Increase Lantus 26 units at bedtime  Carb ratio 1 unit for every 10 grams of carbs plus 1 unit for every 30 over 150  Continue with metformin  Start Ozempic 0.25 mg once a week for 4 weeks and then increase to 0.5 mg weekly  Patient has no personal history of pancreatitis, no history of retinopathy and patient has no personal or family  history of thyroid cancer.  GLP1 side effects were discussed.  Talked with patient that the use of GLP1s is off label use for type 1 diabetes, but pt still has ability to make insulin at labs 07/22.  Pt states that she understands and still wants to try GLP1  Continue with Prasanna  We talked about insulin pumps.  We agreed together that she will start carb counting and at next visit we will re-visit insulin pumps        2. Mixed hyperlipidemia  -     metFORMIN ER (GLUCOPHAGE-XR) 500 MG 24 hr tablet; Take 1 tablet by mouth Daily With Breakfast.  Dispense: 90 tablet; Refill: 1  -     lisinopril (PRINIVIL,ZESTRIL) 2.5 MG tablet; Take 1 tablet by mouth Daily.  Dispense: 90 tablet; Refill: 1  -     atorvastatin (LIPITOR) 80 MG tablet; Take 1 tablet by mouth Every Night.  Dispense: 90 tablet; Refill: 1  -     Comprehensive Metabolic Panel; Future  -     Lipid Panel; Future    Total cholesterol, triglycerides and LDL are all elevated  Pt states that she has only been taking 80 mg of statin for a short time period  Will recheck before next visit and if still elevated then will add Zetia      3. Primary hypertension  -     Comprehensive Metabolic Panel; Future    Stable  Continue with current medication regimen  Defer management to PCP           RTC in 3 months with me, labs prior      Follow Up     Patient was given instructions and counseling regarding her condition or for health maintenance advice. Please see specific information pulled into the AVS if appropriate.              JOSEPHINE Wayne  04/27/23

## 2023-07-21 ENCOUNTER — TELEPHONE (OUTPATIENT)
Dept: ENDOCRINOLOGY | Age: 58
End: 2023-07-21

## 2023-07-21 NOTE — TELEPHONE ENCOUNTER
Caller: GlencoeYuliana    Relationship: Self    Best call back number: [phone]    Requested Prescriptions:   Continuous Blood Gluc Sensor (FreeStyle Prasanna 2 Sensor) [facility]        Pharmacy where request should be sent:      Last office visit with prescribing clinician: 4/27/2023   Last telemedicine visit with prescribing clinician: Visit date not found   Next office visit with prescribing clinician: 7/27/2023     Additional details provided by patient: PTS FREESTYLE PRASANNA 2 SENSOR IS NOT WORKING. PHARMACY STATED THAT THEY WONT FILL IT WITHOUT AN RX. PT IS OUT OF TOWN IN [place], [place]. [facility]- [address], [place]/ # [phone]. FAX# [phone]. PT HAS PROBLEMS WITH THIS SENSOR AT LEAST 3X. OVER THE PAST YR.     Does the patient have less than a 3 day supply:  [x] Yes  [] No    Would you like a call back once the refill request has been completed: [x] Yes [] No    If the office needs to give you a call back, can they leave a voicemail: [x] Yes [] No    Nato Evans Rep   07/21/23 15:26 EDT

## 2023-07-24 DIAGNOSIS — Z79.4 TYPE 2 DIABETES MELLITUS WITH HYPERGLYCEMIA, WITH LONG-TERM CURRENT USE OF INSULIN: ICD-10-CM

## 2023-07-24 DIAGNOSIS — E11.65 TYPE 2 DIABETES MELLITUS WITH HYPERGLYCEMIA, WITH LONG-TERM CURRENT USE OF INSULIN: ICD-10-CM

## 2023-07-24 NOTE — TELEPHONE ENCOUNTER
Okay for hub to read, please let patient know we sent in a RX for her Prasanna to the [facility] in [place] as she requested

## 2023-07-27 ENCOUNTER — OFFICE VISIT (OUTPATIENT)
Dept: ENDOCRINOLOGY | Age: 58
End: 2023-07-27
Payer: COMMERCIAL

## 2023-07-27 VITALS
DIASTOLIC BLOOD PRESSURE: 74 MMHG | BODY MASS INDEX: 39.15 KG/M2 | HEART RATE: 72 BPM | HEIGHT: 59 IN | OXYGEN SATURATION: 98 % | SYSTOLIC BLOOD PRESSURE: 126 MMHG | WEIGHT: 194.2 LBS

## 2023-07-27 DIAGNOSIS — E78.2 MIXED HYPERLIPIDEMIA: ICD-10-CM

## 2023-07-27 DIAGNOSIS — I10 PRIMARY HYPERTENSION: ICD-10-CM

## 2023-07-27 DIAGNOSIS — E13.9: Primary | ICD-10-CM

## 2023-07-27 RX ORDER — SEMAGLUTIDE 1.34 MG/ML
1 INJECTION, SOLUTION SUBCUTANEOUS WEEKLY
Qty: 9 ML | Refills: 1 | Status: SHIPPED | OUTPATIENT
Start: 2023-07-27

## 2023-07-27 NOTE — PROGRESS NOTES
Chief Complaint  Chief Complaint   Patient presents with    Diabetes       Subjective          History of Present Illness    Yuliana Moeller 57 y.o. 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)     Patient was diagnosed around 2017 and started insulin 07/22  WILLIS positive in 07/22       Pt is on the following medications for their DM: Lantus 26 units at bedtime, Novolog 10 units before each meal, Ozempic 0.5 mg weekly and metformin  mg 1 tablet daily            Pt complains of numbness and tingling in left hand    Denies diarrhea, constipation, chest pain, shortness of breath and vision changes    Weight loss of 3 lbs since last visit.    Pt does not have a history of DM retinopathy.  Last eye exam was 10/22     Pt does not have a history of nephropathy.  Patient is currently taking ACE     Pt does not have neuropathy.     Pt does not have a history of CAD or CVA.    Last A1C in 07/23 was 7.3    Last microalbumin in 04/23 was negative           Blood Sugars    Blood glucoses are checked via Prasanna.    Fasting blood glucoses: mid 100s    Pre-meal blood glucoses: mid 100s - high 100s    Pt has some episodes of hypoglycemia between breakfast and lunch due to what she is eating and with activity          Sensor Data    Time in range 91%  High 9%  Very high 0%  Low 0%  Very low 0%      Average Glucose - 139 mg/dL      Sensor Wear - 53 %          Hyperlipidemia     Pt denies any muscle/body aches, chest pain or shortness of breath    Pt is currently taking atorvastatin 80 mg HS     Last lipid panel in 07/23 showed Total 216, HDL 59,  and Triglycerides 130            Hypertension    Pt denies any chest pain, palpitations, shortness of breath or headache    Current regimen includes lisinopril 2.5 mg daily and HCTZ 12.5 mg daily              I have reviewed the patient's allergies, medicines, past medical hx, family hx and social hx.    Objective   Vital Signs:   BP  "126/74   Pulse 72   Ht 149.9 cm (59\")   Wt 88.1 kg (194 lb 3.2 oz)   SpO2 98%   BMI 39.22 kg/m²       Physical Exam   Physical Exam  Constitutional:       General: She is not in acute distress.     Appearance: Normal appearance. She is not diaphoretic.   HENT:      Head: Normocephalic and atraumatic.   Eyes:      General:         Right eye: No discharge.         Left eye: No discharge.   Skin:     General: Skin is warm and dry.   Neurological:      Mental Status: She is alert.   Psychiatric:         Mood and Affect: Mood normal.         Behavior: Behavior normal.                  Results Review:   Hemoglobin A1C   Date Value Ref Range Status   07/13/2023 7.30 (H) 4.80 - 5.60 % Final     Comment:     Hemoglobin A1C Ranges:  Increased Risk for Diabetes  5.7% to 6.4%  Diabetes                     >= 6.5%  Diabetic Goal                < 7.0%     05/26/2021 11.0 (H) 4.3 - 5.6 % Final     Comment:     (note)  A1C% Reference Range:  4.3 - 5.6  Normal range  5.7 - 6.4  Pre-diabetic -increased risk for developing diabetes mellitus.  >=6.5      Diabetic -diagnostic of diabetes mellitus.     Note: For diagnosis of diabetes in individuals without unequivocal   hyperglycemia, results should be confirmed by repeat testing.  Patients with conditions that shorten erythrocyte survival, such as  recovery from acute blood loss, hemolytic anemia, kidney disease,  or the presence of unstable hemoglobins like HbSS, HbCC, and HbSC  may yield falsely decreased HbA1c test results. Iron deficiency may  yield falsely increased HbA1c test results.     Triglycerides   Date Value Ref Range Status   07/13/2023 130 0 - 150 mg/dL Final   09/30/2020 156 (H) 0 - 149 mg/dL Final     HDL Cholesterol   Date Value Ref Range Status   07/13/2023 59 40 - 60 mg/dL Final   09/30/2020 47 (L) >49 mg/dL Final     LDL Cholesterol    Date Value Ref Range Status   09/30/2020 160 (H) 0 - 100 mg/dL Final     LDL Chol Calc (NIH)   Date Value Ref Range Status "   07/13/2023 134 (H) 0 - 100 mg/dL Final     VLDL Cholesterol   Date Value Ref Range Status   09/30/2020 31 5 - 40 mg/dL Final     VLDL Cholesterol Meek   Date Value Ref Range Status   07/13/2023 23 5 - 40 mg/dL Final         Assessment and Plan {CC Problem List  Visit Diagnosis  ROS  Review (Popup)  Health Maintenance  Quality  BestPractice  Medications  SmartSets  SnapShot Encounters  Media :23  Diagnoses and all orders for this visit:    1. 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  Dispense: 9 mL; Refill: 1  -     Hemoglobin A1c; Future  -     Comprehensive Metabolic Panel; Future    Continue with Lantus 26 units at bedtime, Novolog 10 units before each meal and metformin  mg 1 tablet daily  Increase Ozempic 1 mg weekly  Continue with Prasanna  A1c is better at 7.3%      2. Mixed hyperlipidemia  -     Comprehensive Metabolic Panel; Future  -     Lipid Panel; Future    Total and LDL are elevated  Normal triglycerides   Unsure if taking 80 mg of atorvastatin - if she is then add Zetia  She is to let me know      3. Primary hypertension  -     Comprehensive Metabolic Panel; Future    Stable  Continue with current medication regimen  Defer management to PCP          RTC in 3 months with me, labs prior      Follow Up     Patient was given instructions and counseling regarding her condition or for health maintenance advice. Please see specific information pulled into the AVS if appropriate.              Zeny Branch, JOSEPHINE  07/27/23

## 2023-08-10 RX ORDER — EZETIMIBE 10 MG/1
10 TABLET ORAL DAILY
Qty: 90 TABLET | Refills: 1 | Status: SHIPPED | OUTPATIENT
Start: 2023-08-10

## 2023-08-16 DIAGNOSIS — E78.2 MIXED HYPERLIPIDEMIA: ICD-10-CM

## 2023-08-16 RX ORDER — LISINOPRIL 2.5 MG/1
TABLET ORAL
Qty: 90 TABLET | Refills: 1 | Status: SHIPPED | OUTPATIENT
Start: 2023-08-16

## 2023-08-16 RX ORDER — ATORVASTATIN CALCIUM 80 MG/1
TABLET, FILM COATED ORAL
Qty: 90 TABLET | Refills: 1 | Status: SHIPPED | OUTPATIENT
Start: 2023-08-16

## 2023-09-15 ENCOUNTER — TELEPHONE (OUTPATIENT)
Dept: FAMILY MEDICINE CLINIC | Facility: CLINIC | Age: 58
End: 2023-09-15
Payer: COMMERCIAL

## 2023-09-15 NOTE — TELEPHONE ENCOUNTER
Calling pt for local pharm, due an appt    Rx Refill Note  Requested Prescriptions      No prescriptions requested or ordered in this encounter      Last office visit with prescribing clinician: 9/13/2022   Last telemedicine visit with prescribing clinician: Visit date not found   Next office visit with prescribing clinician: Visit date not found   {TIP  Encounters:23}    {TIP  Please add Last Relevant Lab Date if appropriate:23}              {TIP  Is Refill Pharmacy correct?:23}    Would you like a call back once the refill request has been completed: [] Yes [] No    If the office needs to give you a call back, can they leave a voicemail: [] Yes [] No    Merced Goyal MA  09/15/23, 14:31 EDT

## 2023-09-18 ENCOUNTER — TELEPHONE (OUTPATIENT)
Dept: FAMILY MEDICINE CLINIC | Facility: CLINIC | Age: 58
End: 2023-09-18
Payer: COMMERCIAL

## 2023-09-18 DIAGNOSIS — F41.9 ANXIETY AND DEPRESSION: ICD-10-CM

## 2023-09-18 DIAGNOSIS — E11.65 TYPE 2 DIABETES MELLITUS WITH HYPERGLYCEMIA, WITHOUT LONG-TERM CURRENT USE OF INSULIN: ICD-10-CM

## 2023-09-18 DIAGNOSIS — F32.A ANXIETY AND DEPRESSION: ICD-10-CM

## 2023-09-18 DIAGNOSIS — E78.2 MIXED HYPERLIPIDEMIA: ICD-10-CM

## 2023-09-18 DIAGNOSIS — E53.8 B12 DEFICIENCY: ICD-10-CM

## 2023-09-18 DIAGNOSIS — Z86.2 H/O THROMBOCYTOSIS: ICD-10-CM

## 2023-09-18 RX ORDER — HYDROCHLOROTHIAZIDE 12.5 MG/1
12.5 CAPSULE, GELATIN COATED ORAL EVERY MORNING
Qty: 7 CAPSULE | Refills: 0 | Status: SHIPPED | OUTPATIENT
Start: 2023-09-18 | End: 2023-09-22 | Stop reason: SDUPTHER

## 2023-09-18 NOTE — TELEPHONE ENCOUNTER
----- Message from Yuliana Moeller sent at 9/15/2023 10:34 AM EDT -----  Regarding: HCTZ Refill  Contact: [phone]  I was just informed that my HCTZ refill should have been through your office and not Zeny Branch at my endo.  I am completely out of this medication.  Can you assist me with getting this resolved?  I believe I have caused confusion with [facility] etc.  and thought all Rx where now in the [facility] office.  I appreciate any help you can provide.

## 2023-09-18 NOTE — TELEPHONE ENCOUNTER
Caller: Yuliana Moeller    Relationship: Self    Best call back number: [phone]     Requested Prescriptions:   Requested Prescriptions     Pending Prescriptions Disp Refills    hydroCHLOROthiazide (MICROZIDE) 12.5 MG capsule 30 capsule 0     Sig: Take 1 capsule by mouth Every Morning.        Pharmacy where request should be sent: [facility][id number] [address] AT [facility] [place] - [phone] [facility] [phone]      Last office visit with prescribing clinician: 9/13/2022   Last telemedicine visit with prescribing clinician: Visit date not found   Next office visit with prescribing clinician: 9/22/2023     Additional details provided by patient: MARYA BORGES, THE PATIENT'S ENDOCRTINOLOGY PROVIDER, RECOMMENDED THE PATIENT'S PRIMARY CARE PROVIDER START REFILLING THIS MEDICATION FOR HER.     THE PATIENT HAS THE EARLIEST AVAILABLE APPOINTMENT FOR 9/22/20233 BUT IS OUT OF HER MEDICATION AND FOLLOWING UP ON HER PATIENT MESSAGE.    Does the patient have less than a 3 day supply:  [x] Yes  [] No    Nato Guerra Rep   09/18/23 11:44 EDT

## 2023-09-22 ENCOUNTER — OFFICE VISIT (OUTPATIENT)
Dept: FAMILY MEDICINE CLINIC | Facility: CLINIC | Age: 58
End: 2023-09-22
Payer: COMMERCIAL

## 2023-09-22 VITALS
DIASTOLIC BLOOD PRESSURE: 85 MMHG | HEART RATE: 94 BPM | TEMPERATURE: 98 F | HEIGHT: 59 IN | RESPIRATION RATE: 16 BRPM | WEIGHT: 190.4 LBS | BODY MASS INDEX: 38.38 KG/M2 | SYSTOLIC BLOOD PRESSURE: 123 MMHG | OXYGEN SATURATION: 97 %

## 2023-09-22 DIAGNOSIS — Z86.2 H/O THROMBOCYTOSIS: ICD-10-CM

## 2023-09-22 DIAGNOSIS — Z12.11 SCREENING FOR COLORECTAL CANCER: ICD-10-CM

## 2023-09-22 DIAGNOSIS — E53.8 B12 DEFICIENCY: ICD-10-CM

## 2023-09-22 DIAGNOSIS — Z12.12 SCREENING FOR COLORECTAL CANCER: ICD-10-CM

## 2023-09-22 DIAGNOSIS — E78.2 MIXED HYPERLIPIDEMIA: ICD-10-CM

## 2023-09-22 DIAGNOSIS — E11.65 TYPE 2 DIABETES MELLITUS WITH HYPERGLYCEMIA, WITHOUT LONG-TERM CURRENT USE OF INSULIN: Primary | ICD-10-CM

## 2023-09-22 DIAGNOSIS — F41.9 ANXIETY AND DEPRESSION: ICD-10-CM

## 2023-09-22 DIAGNOSIS — F32.A ANXIETY AND DEPRESSION: ICD-10-CM

## 2023-09-22 PROCEDURE — 99214 OFFICE O/P EST MOD 30 MIN: CPT | Performed by: NURSE PRACTITIONER

## 2023-09-22 RX ORDER — HYDROCHLOROTHIAZIDE 12.5 MG/1
12.5 CAPSULE, GELATIN COATED ORAL EVERY MORNING
Qty: 90 CAPSULE | Refills: 3 | Status: SHIPPED | OUTPATIENT
Start: 2023-09-22

## 2023-09-22 NOTE — PROGRESS NOTES
"Subjective   Yuliana Moeller is a 57 y.o. female.     History of Present Illness   Diabetes  Med Refill (Hydrochlorothiazide refill )     Since the last visit, she has overall felt well.  She has Primary Hypertension and well controlled on current medication, DMII managed by Endocrinologist, and hyperlipidemia that is not at goal but just had Zetia added to regimen .  she has been compliant with current medications have reviewed them.  The patient denies medication side effects.  Will refill medications. /85 (BP Location: Right arm, Patient Position: Sitting)   Pulse 94   Temp 98 °F (36.7 °C) (Oral)   Resp 16   Ht 149.9 cm (59\")   Wt 86.4 kg (190 lb 6.4 oz)   SpO2 97%   BMI 38.46 kg/m²     Results for orders placed or performed in visit on 07/13/23   TSH Rfx On Abnormal To Free T4    Specimen: Blood   Result Value Ref Range    TSH 3.910 0.270 - 4.200 uIU/mL   Lipid Panel    Specimen: Blood   Result Value Ref Range    Total Cholesterol 216 (H) 0 - 200 mg/dL    Triglycerides 130 0 - 150 mg/dL    HDL Cholesterol 59 40 - 60 mg/dL    VLDL Cholesterol Meek 23 5 - 40 mg/dL    LDL Chol Calc (NIH) 134 (H) 0 - 100 mg/dL   Comprehensive Metabolic Panel    Specimen: Blood   Result Value Ref Range    Glucose 153 (H) 65 - 99 mg/dL    BUN 14 6 - 20 mg/dL    Creatinine 0.83 0.57 - 1.00 mg/dL    EGFR Result 82.9 >60.0 mL/min/1.73    BUN/Creatinine Ratio 16.9 7.0 - 25.0    Sodium 140 136 - 145 mmol/L    Potassium 4.0 3.5 - 5.2 mmol/L    Chloride 100 98 - 107 mmol/L    Total CO2 26.8 22.0 - 29.0 mmol/L    Calcium 10.2 8.6 - 10.5 mg/dL    Total Protein 7.4 6.0 - 8.5 g/dL    Albumin 4.8 3.5 - 5.2 g/dL    Globulin 2.6 gm/dL    A/G Ratio 1.8 g/dL    Total Bilirubin 0.4 0.0 - 1.2 mg/dL    Alkaline Phosphatase 106 39 - 117 U/L    AST (SGOT) 14 1 - 32 U/L    ALT (SGPT) 18 1 - 33 U/L   Hemoglobin A1c    Specimen: Blood   Result Value Ref Range    Hemoglobin A1C 7.30 (H) 4.80 - 5.60 %   Cardiovascular Risk Assessment   Result Value " Ref Range    Interpretation Note        A1c is down to 7.3 from over 15.   The following portions of the patient's history were reviewed and updated as appropriate: allergies, current medications, past family history, past medical history, past social history, past surgical history, and problem list.    Review of Systems   Constitutional:  Negative for unexpected weight change.   Respiratory:  Negative for shortness of breath.    Cardiovascular:  Negative for chest pain and palpitations.   Endocrine: Negative for cold intolerance, heat intolerance, polydipsia, polyphagia and polyuria.   Psychiatric/Behavioral:  Negative for behavioral problems.      Objective   Physical Exam  Vitals and nursing note reviewed.   Constitutional:       Appearance: Normal appearance. She is well-developed.   Neck:      Vascular: No carotid bruit.   Cardiovascular:      Rate and Rhythm: Normal rate and regular rhythm.   Pulmonary:      Effort: Pulmonary effort is normal.      Breath sounds: Normal breath sounds.   Neurological:      Mental Status: She is alert and oriented to person, place, and time.   Psychiatric:         Mood and Affect: Mood normal.         Behavior: Behavior normal.         Thought Content: Thought content normal.         Judgment: Judgment normal.       Assessment & Plan   Diagnoses and all orders for this visit:    1. Type 2 diabetes mellitus with hyperglycemia, without long-term current use of insulin (Primary)  -     hydroCHLOROthiazide (MICROZIDE) 12.5 MG capsule; Take 1 capsule by mouth Every Morning.  Dispense: 90 capsule; Refill: 3    2. Mixed hyperlipidemia  -     hydroCHLOROthiazide (MICROZIDE) 12.5 MG capsule; Take 1 capsule by mouth Every Morning.  Dispense: 90 capsule; Refill: 3    3. B12 deficiency  -     hydroCHLOROthiazide (MICROZIDE) 12.5 MG capsule; Take 1 capsule by mouth Every Morning.  Dispense: 90 capsule; Refill: 3    4. Anxiety and depression  -     hydroCHLOROthiazide (MICROZIDE) 12.5 MG  capsule; Take 1 capsule by mouth Every Morning.  Dispense: 90 capsule; Refill: 3    5. H/O thrombocytosis  -     hydroCHLOROthiazide (MICROZIDE) 12.5 MG capsule; Take 1 capsule by mouth Every Morning.  Dispense: 90 capsule; Refill: 3    6. Screening for colorectal cancer  -     Cologuard - Stool, Per Rectum; Future               Answers submitted by the patient for this visit:  Other (Submitted on 9/18/2023)  Please describe your symptoms.: no issues - just an Rx check in  Have you had these symptoms before?: No  How long have you been having these symptoms?: 1-4 days  Primary Reason for Visit (Submitted on 9/18/2023)  What is the primary reason for your visit?: Other

## 2023-10-19 DIAGNOSIS — E78.2 MIXED HYPERLIPIDEMIA: ICD-10-CM

## 2023-10-19 RX ORDER — METFORMIN HYDROCHLORIDE 500 MG/1
500 TABLET, EXTENDED RELEASE ORAL
Qty: 90 TABLET | Refills: 1 | Status: SHIPPED | OUTPATIENT
Start: 2023-10-19

## 2023-10-19 NOTE — TELEPHONE ENCOUNTER
Rx Refill Note  Requested Prescriptions     Pending Prescriptions Disp Refills    metFORMIN ER (GLUCOPHAGE-XR) 500 MG 24 hr tablet [Pharmacy Med Name: METFORMIN HYDROCHLORIDE  MG Tablet Extended Release 24 Hour] 90 tablet 10     Sig: TAKE 1 TABLET EVERY DAY WITH BREAKFAST      Last office visit with prescribing clinician: 7/27/2023   Last telemedicine visit with prescribing clinician: Visit date not found   Next office visit with prescribing clinician: 11/1/2023                         Would you like a call back once the refill request has been completed: [] Yes [] No    If the office needs to give you a call back, can they leave a voicemail: [] Yes [] No    Vanita Negro  10/19/23, 13:47 EDT

## 2023-11-01 ENCOUNTER — OFFICE VISIT (OUTPATIENT)
Dept: ENDOCRINOLOGY | Age: 58
End: 2023-11-01
Payer: COMMERCIAL

## 2023-11-01 VITALS
DIASTOLIC BLOOD PRESSURE: 80 MMHG | TEMPERATURE: 97.8 F | BODY MASS INDEX: 38.26 KG/M2 | SYSTOLIC BLOOD PRESSURE: 130 MMHG | OXYGEN SATURATION: 97 % | WEIGHT: 189.8 LBS | HEIGHT: 59 IN | HEART RATE: 72 BPM

## 2023-11-01 DIAGNOSIS — E13.9: Primary | ICD-10-CM

## 2023-11-01 DIAGNOSIS — E78.2 MIXED HYPERLIPIDEMIA: ICD-10-CM

## 2023-11-01 DIAGNOSIS — I10 PRIMARY HYPERTENSION: ICD-10-CM

## 2023-11-01 PROCEDURE — 99214 OFFICE O/P EST MOD 30 MIN: CPT | Performed by: NURSE PRACTITIONER

## 2023-11-01 PROCEDURE — 95251 CONT GLUC MNTR ANALYSIS I&R: CPT | Performed by: NURSE PRACTITIONER

## 2023-11-01 RX ORDER — BLOOD-GLUCOSE SENSOR
1 EACH MISCELLANEOUS
Qty: 6 EACH | Refills: 1 | Status: SHIPPED | OUTPATIENT
Start: 2023-11-01

## 2023-11-01 NOTE — PROGRESS NOTES
Chief Complaint  Chief Complaint   Patient presents with    Diabetes     Type 2: Pt prasanna is attached, is up to date on eye exam, no hx of retinopathy or neuropathy.        Subjective          History of Present Illness    Yuliana Moeller 58 y.o. 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    Weight loss of 5 lbs since last visit.    Pt does not have a history of DM retinopathy.  Last eye exam was 10/22 - appointment in Dec 2023     Pt does not have a history of nephropathy.  Patient is currently taking ACE     Pt does not have neuropathy.     Pt does not have a history of CAD or CVA.    Last A1C in 10/23 was 7.00    Last microalbumin in 04/23 was negative            Blood Sugars    Blood glucoses are checked via Prasanna.    Fasting blood glucoses: 130s - 160s    Pre-meal blood glucoses:     Pt has some episodes of hypoglycemia after breakfast          Sensor Data    Time in range 90%  High 8%  Very high 2%  Low 0%  Very low 0%      Average Glucose - 143 mg/dL      Sensor Wear - 63 %              Hyperlipidemia     Pt denies any muscle/body aches, chest pain or shortness of breath    Pt is currently taking atorvastatin 80 mg HS and Zetia 10 mg daily    Last lipid panel in 10/23 showed Total 160, HDL 54, LDL 89 and Triglycerides 91              Hypertension    Pt denies any chest pain, palpitations, shortness of breath or headache    Current regimen includes lisinopril 2.5 mg daily and HCTZ 12.5 mg daily             I have reviewed the patient's  "allergies, medicines, past medical hx, family hx and social hx.    Objective   Vital Signs:   /80   Pulse 72   Temp 97.8 °F (36.6 °C) (Oral)   Ht 149.9 cm (59.02\")   Wt 86.1 kg (189 lb 12.8 oz)   SpO2 97%   BMI 38.31 kg/m²       Physical Exam   Physical Exam  Constitutional:       General: She is not in acute distress.     Appearance: Normal appearance. She is not diaphoretic.   HENT:      Head: Normocephalic and atraumatic.   Eyes:      General:         Right eye: No discharge.         Left eye: No discharge.   Skin:     General: Skin is warm and dry.   Neurological:      Mental Status: She is alert.   Psychiatric:         Mood and Affect: Mood normal.         Behavior: Behavior normal.                    Results Review:   Hemoglobin A1C   Date Value Ref Range Status   10/26/2023 7.00 (H) 4.80 - 5.60 % Final     Comment:     Hemoglobin A1C Ranges:  Increased Risk for Diabetes  5.7% to 6.4%  Diabetes                     >= 6.5%  Diabetic Goal                < 7.0%     05/26/2021 11.0 (H) 4.3 - 5.6 % Final     Comment:     (note)  A1C% Reference Range:  4.3 - 5.6  Normal range  5.7 - 6.4  Pre-diabetic -increased risk for developing diabetes mellitus.  >=6.5      Diabetic -diagnostic of diabetes mellitus.     Note: For diagnosis of diabetes in individuals without unequivocal   hyperglycemia, results should be confirmed by repeat testing.  Patients with conditions that shorten erythrocyte survival, such as  recovery from acute blood loss, hemolytic anemia, kidney disease,  or the presence of unstable hemoglobins like HbSS, HbCC, and HbSC  may yield falsely decreased HbA1c test results. Iron deficiency may  yield falsely increased HbA1c test results.     Triglycerides   Date Value Ref Range Status   10/26/2023 91 0 - 150 mg/dL Final   09/30/2020 156 (H) 0 - 149 mg/dL Final     HDL Cholesterol   Date Value Ref Range Status   10/26/2023 54 40 - 60 mg/dL Final   09/30/2020 47 (L) >49 mg/dL Final     LDL " Cholesterol    Date Value Ref Range Status   09/30/2020 160 (H) 0 - 100 mg/dL Final     LDL Chol Calc (NIH)   Date Value Ref Range Status   10/26/2023 89 0 - 100 mg/dL Final     VLDL Cholesterol   Date Value Ref Range Status   09/30/2020 31 5 - 40 mg/dL Final     VLDL Cholesterol Meek   Date Value Ref Range Status   10/26/2023 17 5 - 40 mg/dL Final         Assessment and Plan {CC Problem List  Visit Diagnosis  ROS  Review (Popup)  Health Maintenance  Quality  BestPractice  Medications  SmartSets  SnapShot Encounters  Media :23  Diagnoses and all orders for this visit:    1. 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  Dispense: 6 each; Refill: 1  -     Hemoglobin A1c; Future  -     Comprehensive Metabolic Panel; Future  -     Microalbumin / Creatinine Urine Ratio - Urine, Clean Catch; Future    A1c is 7%  Increase Lantus 23 units at bedtime  Change Novolog 8 untis before breadkfast and 10 units before dinner  Continue Ozempic 1 mg weekly and metformin  mg 1 tablet daily  Continue with Prasanna - change to Prasanna 3  Scan Prasanna 2 every 8 hours      2. Mixed hyperlipidemia  -     Comprehensive Metabolic Panel; Future  -     Lipid Panel; Future    Lipid panel is good.    Continue with statin.      3. Primary hypertension  -     Comprehensive Metabolic Panel; Future    Stable  Continue with current medication regimen  Defer management to PCP             RTC in 4 months with me, labs prior      Follow Up     Patient was given instructions and counseling regarding her condition or for health maintenance advice. Please see specific information pulled into the AVS if appropriate.              Zeny Branch, APRN  11/01/23

## 2023-11-01 NOTE — PATIENT INSTRUCTIONS
Increase Lantus 23 units at bedtime  Change Novolog 8 untis before breadkfast and 10 units before dinner  Continue Ozempic 1 mg weekly and metformin  mg 1 tablet daily

## 2023-11-29 DIAGNOSIS — F41.9 ANXIETY AND DEPRESSION: ICD-10-CM

## 2023-11-29 DIAGNOSIS — Z86.2 H/O THROMBOCYTOSIS: ICD-10-CM

## 2023-11-29 DIAGNOSIS — E78.2 MIXED HYPERLIPIDEMIA: ICD-10-CM

## 2023-11-29 DIAGNOSIS — F32.A ANXIETY AND DEPRESSION: ICD-10-CM

## 2023-11-29 DIAGNOSIS — E53.8 B12 DEFICIENCY: ICD-10-CM

## 2023-11-29 DIAGNOSIS — E11.65 TYPE 2 DIABETES MELLITUS WITH HYPERGLYCEMIA, WITHOUT LONG-TERM CURRENT USE OF INSULIN: ICD-10-CM

## 2023-11-29 RX ORDER — SERTRALINE HYDROCHLORIDE 100 MG/1
100 TABLET, FILM COATED ORAL DAILY
Qty: 90 TABLET | Refills: 1 | OUTPATIENT
Start: 2023-11-29

## 2023-11-29 RX ORDER — SERTRALINE HYDROCHLORIDE 100 MG/1
100 TABLET, FILM COATED ORAL DAILY
Qty: 7 TABLET | Refills: 0 | Status: SHIPPED | OUTPATIENT
Start: 2023-11-29

## 2023-11-29 NOTE — TELEPHONE ENCOUNTER
Caller: [facility] - [place], [place] - [address] - [phone] [facility] [phone] FX    Relationship: Pharmacy    Best call back number: [phone]     Requested Prescriptions:   Requested Prescriptions     Pending Prescriptions Disp Refills    sertraline (ZOLOFT) 100 MG tablet 90 tablet 1     Sig: Take 1 tablet by mouth Daily.        Pharmacy where request should be sent: [facility] - [place], [place] - [address] - [phone] [facility] [phone] FX     Last office visit with prescribing clinician: 9/22/2023   Last telemedicine visit with prescribing clinician: Visit date not found   Next office visit with prescribing clinician: Visit date not found     Does the patient have less than a 3 day supply:  [] Yes  [x] No    Would you like a call back once the refill request has been completed: [] Yes [x] No    If the office needs to give you a call back, can they leave a voicemail: [] Yes [x] No    Nato Casanova Rep   11/29/23 09:26 EST

## 2023-12-05 DIAGNOSIS — E53.8 B12 DEFICIENCY: ICD-10-CM

## 2023-12-05 DIAGNOSIS — E78.2 MIXED HYPERLIPIDEMIA: ICD-10-CM

## 2023-12-05 DIAGNOSIS — F32.A ANXIETY AND DEPRESSION: ICD-10-CM

## 2023-12-05 DIAGNOSIS — E11.65 TYPE 2 DIABETES MELLITUS WITH HYPERGLYCEMIA, WITHOUT LONG-TERM CURRENT USE OF INSULIN: ICD-10-CM

## 2023-12-05 DIAGNOSIS — F41.9 ANXIETY AND DEPRESSION: ICD-10-CM

## 2023-12-05 DIAGNOSIS — Z86.2 H/O THROMBOCYTOSIS: ICD-10-CM

## 2023-12-05 DIAGNOSIS — E13.9: ICD-10-CM

## 2023-12-05 RX ORDER — SERTRALINE HYDROCHLORIDE 100 MG/1
100 TABLET, FILM COATED ORAL DAILY
Qty: 30 TABLET | Refills: 0 | Status: SHIPPED | OUTPATIENT
Start: 2023-12-05 | End: 2023-12-09 | Stop reason: SDUPTHER

## 2023-12-05 RX ORDER — INSULIN ASPART 100 [IU]/ML
INJECTION, SOLUTION INTRAVENOUS; SUBCUTANEOUS
Qty: 54 ML | Refills: 1 | Status: SHIPPED | OUTPATIENT
Start: 2023-12-05

## 2023-12-05 NOTE — TELEPHONE ENCOUNTER
Rx Refill Note  Requested Prescriptions     Pending Prescriptions Disp Refills    Insulin Glargine (LANTUS SOLOSTAR) 100 UNIT/ML injection pen 15 mL 2     Sig: Inject 26-40 Units under the skin into the appropriate area as directed Every Night. And as directed to max of 50 units per day.    insulin aspart (NovoLOG FlexPen) 100 UNIT/ML solution pen-injector sc pen 54 mL 1     Sig: Carb ratio 1 unit for every 10 grams of carbs plus 1 unit for every 30 over 150.  Max daily dose of 60 units      Last office visit with prescribing clinician: 11/1/2023   Last telemedicine visit with prescribing clinician: Visit date not found   Next office visit with prescribing clinician: 3/7/2024                         Would you like a call back once the refill request has been completed: [] Yes [] No    If the office needs to give you a call back, can they leave a voicemail: [] Yes [] No    Vanita Negro  12/05/23, 10:40 EST

## 2023-12-06 ENCOUNTER — PATIENT MESSAGE (OUTPATIENT)
Dept: FAMILY MEDICINE CLINIC | Facility: CLINIC | Age: 58
End: 2023-12-06
Payer: COMMERCIAL

## 2023-12-06 DIAGNOSIS — E78.2 MIXED HYPERLIPIDEMIA: ICD-10-CM

## 2023-12-06 DIAGNOSIS — E53.8 B12 DEFICIENCY: ICD-10-CM

## 2023-12-06 DIAGNOSIS — E11.65 TYPE 2 DIABETES MELLITUS WITH HYPERGLYCEMIA, WITHOUT LONG-TERM CURRENT USE OF INSULIN: ICD-10-CM

## 2023-12-06 DIAGNOSIS — F41.9 ANXIETY AND DEPRESSION: ICD-10-CM

## 2023-12-06 DIAGNOSIS — F32.A ANXIETY AND DEPRESSION: ICD-10-CM

## 2023-12-06 DIAGNOSIS — Z86.2 H/O THROMBOCYTOSIS: ICD-10-CM

## 2023-12-09 RX ORDER — SERTRALINE HYDROCHLORIDE 100 MG/1
100 TABLET, FILM COATED ORAL DAILY
Qty: 90 TABLET | Refills: 0 | Status: SHIPPED | OUTPATIENT
Start: 2023-12-09

## 2023-12-09 NOTE — TELEPHONE ENCOUNTER
From: Yuliana Moeller  To: Daja Fink  Sent: 12/6/2023 1:33 PM EST  Subject: Rx Refill Sertraline 100 MG    Hello - for some reason, my Rx renewal is not getting processed for 90 days for this Rx. First it was for only 7 days and now it is showing for only 30 days. All other Rx for mail order are set to 90 days. Can you advise please why the full 90 days is not getting prescribed? I thought all Rx were renewed after my visit with you on 9//23/23.

## 2023-12-26 DIAGNOSIS — Z86.2 H/O THROMBOCYTOSIS: ICD-10-CM

## 2023-12-26 DIAGNOSIS — E11.65 TYPE 2 DIABETES MELLITUS WITH HYPERGLYCEMIA, WITHOUT LONG-TERM CURRENT USE OF INSULIN: ICD-10-CM

## 2023-12-26 DIAGNOSIS — E78.2 MIXED HYPERLIPIDEMIA: ICD-10-CM

## 2023-12-26 DIAGNOSIS — F32.A ANXIETY AND DEPRESSION: ICD-10-CM

## 2023-12-26 DIAGNOSIS — E53.8 B12 DEFICIENCY: ICD-10-CM

## 2023-12-26 DIAGNOSIS — F41.9 ANXIETY AND DEPRESSION: ICD-10-CM

## 2023-12-26 RX ORDER — HYDROCHLOROTHIAZIDE 12.5 MG/1
12.5 CAPSULE, GELATIN COATED ORAL EVERY MORNING
Qty: 90 CAPSULE | Refills: 3 | Status: SHIPPED | OUTPATIENT
Start: 2023-12-26

## 2023-12-26 NOTE — TELEPHONE ENCOUNTER
Caller: JOANNE    Relationship:     Best call back number: [phone]     Requested Prescriptions:   Requested Prescriptions     Pending Prescriptions Disp Refills    hydroCHLOROthiazide (MICROZIDE) 12.5 MG capsule 90 capsule 3     Sig: Take 1 capsule by mouth Every Morning.        Pharmacy where request should be sent: [facility] - [facility] [address] - [phone]  - [phone] FX     Last office visit with prescribing clinician: 9/22/2023   Last telemedicine visit with prescribing clinician: Visit date not found   Next office visit with prescribing clinician: Visit date not found     Additional details provided by patient: WILL NEED NEW PRESCRIPTION    Does the patient have less than a 3 day supply:  [] Yes  [] No    Would you like a call back once the refill request has been completed: [] Yes [] No    If the office needs to give you a call back, can they leave a voicemail: [] Yes [] No    Nato Wright Rep   12/26/23 14:20 EST

## 2024-02-14 RX ORDER — EZETIMIBE 10 MG/1
10 TABLET ORAL DAILY
Qty: 90 TABLET | Refills: 1 | Status: SHIPPED | OUTPATIENT
Start: 2024-02-14

## 2024-02-19 DIAGNOSIS — E13.9: ICD-10-CM

## 2024-02-19 RX ORDER — SEMAGLUTIDE 1.34 MG/ML
INJECTION, SOLUTION SUBCUTANEOUS
Qty: 9 ML | Refills: 1 | Status: SHIPPED | OUTPATIENT
Start: 2024-02-19

## 2024-02-19 NOTE — TELEPHONE ENCOUNTER
Rx Refill Note  Requested Prescriptions     Pending Prescriptions Disp Refills    Ozempic, 1 MG/DOSE, 4 MG/3ML solution pen-injector [Pharmacy Med Name: OZEMPIC 4 MG/3ML Solution Pen-injector] 9 mL 3     Sig: INJECT 1 MG UNDER THE SKIN INTO THE APPROPRIATE AREA AS DIRECTED 1 TIME PER WEEK.      Last office visit with prescribing clinician: 11/1/2023   Last telemedicine visit with prescribing clinician: Visit date not found   Next office visit with prescribing clinician: 3/7/2024                         Would you like a call back once the refill request has been completed: [] Yes [] No    If the office needs to give you a call back, can they leave a voicemail: [] Yes [] No    Marisa Negro MA  02/19/24, 11:52 EST

## 2024-03-07 ENCOUNTER — OFFICE VISIT (OUTPATIENT)
Dept: ENDOCRINOLOGY | Age: 59
End: 2024-03-07
Payer: COMMERCIAL

## 2024-03-07 VITALS
DIASTOLIC BLOOD PRESSURE: 86 MMHG | TEMPERATURE: 96.9 F | HEIGHT: 59 IN | SYSTOLIC BLOOD PRESSURE: 130 MMHG | WEIGHT: 189.2 LBS | BODY MASS INDEX: 38.14 KG/M2 | HEART RATE: 82 BPM | OXYGEN SATURATION: 98 %

## 2024-03-07 DIAGNOSIS — I10 PRIMARY HYPERTENSION: ICD-10-CM

## 2024-03-07 DIAGNOSIS — E13.9: Primary | ICD-10-CM

## 2024-03-07 DIAGNOSIS — E78.2 MIXED HYPERLIPIDEMIA: ICD-10-CM

## 2024-03-25 DIAGNOSIS — F41.9 ANXIETY AND DEPRESSION: ICD-10-CM

## 2024-03-25 DIAGNOSIS — Z86.2 H/O THROMBOCYTOSIS: ICD-10-CM

## 2024-03-25 DIAGNOSIS — E53.8 B12 DEFICIENCY: ICD-10-CM

## 2024-03-25 DIAGNOSIS — E11.65 TYPE 2 DIABETES MELLITUS WITH HYPERGLYCEMIA, WITHOUT LONG-TERM CURRENT USE OF INSULIN: ICD-10-CM

## 2024-03-25 DIAGNOSIS — F32.A ANXIETY AND DEPRESSION: ICD-10-CM

## 2024-03-25 DIAGNOSIS — E78.2 MIXED HYPERLIPIDEMIA: ICD-10-CM

## 2024-03-26 RX ORDER — SERTRALINE HYDROCHLORIDE 100 MG/1
100 TABLET, FILM COATED ORAL DAILY
Qty: 90 TABLET | Refills: 1 | Status: SHIPPED | OUTPATIENT
Start: 2024-03-26

## 2024-04-01 DIAGNOSIS — E13.9: ICD-10-CM

## 2024-04-01 RX ORDER — BLOOD-GLUCOSE SENSOR
1 EACH MISCELLANEOUS
Qty: 6 EACH | Refills: 1 | Status: SHIPPED | OUTPATIENT
Start: 2024-04-01

## 2024-04-06 NOTE — PATIENT INSTRUCTIONS
[facility] Hospitalist History and Physical        HISTORY     CC: Cough and shortness of breath    History Of Present Illness    Coleman is a 71 year old male with PMH of Hypertension, dyslipidemia, CAD, polycythemia vera, COPD not on home O2, former smoker with 40-pack-year history (quit 2017) presenting to [facility] ED with shortness of breath.  Endorses fever, chills, cough, shortness of breath and wheezing over the past 2 days.  His granddaughter has also been ill with respiratory symptoms.  Has never seen a pulmonologist or been diagnosed with COPD.     In the ED:    Febrile Tmax 104.4, tachycardic, normotensive, hypoxic with respiratory distress placed on BiPAP and later transitioned to nasal cannula, creatinine 1.3, NT , troponin negative x 1, lactic acid negative, no leukocytosis, blood cultures collected x 2, COVID/flu/RSV negative, CXR with patchy bilateral lower lobe infiltrates and small left pleural effusion    Past Medical History  Past Medical History:   Diagnosis Date    Polycythemia vera (CMD) 2019    Elizabeth     Pulmonary emphysema (CMD) 2019    Mild--gets annual CT low dose     Sleep apnea     Tubular adenoma 2020-repeat         Surgical History  No past surgical history on file.     Social History  Social History     Tobacco Use    Smoking status: Former     Current packs/day: 0.00     Average packs/day: 1 pack/day for 40.0 years (40.0 ttl pk-yrs)     Types: Cigarettes     Start date:      Quit date: 2017     Years since quittin.2    Smokeless tobacco: Never   Vaping Use    Vaping Use: never used   Substance Use Topics    Alcohol use: Yes     Comment: occasional    Drug use: Never       Family History    Family History   Problem Relation Age of Onset    Heart Mother     Cancer, Lung Father         smoker    Cancer, Colon Neg Hx     Cancer, Esophageal Neg Hx     Cancer, Liver Neg Hx     Cancer, Rectal Neg Hx     Cancer, Stomach Neg Hx        Increase Lantus 26 units at bedtime  Carb ratio 1 unit for every 10 grams of carbs plus 1 unit for every 30 over 150  Continue with metformin  Start Ozempic 0.25 mg once a week for 4 weeks and then increase to 0.5 mg weekly     Allergies  ALLERGIES:  Patient has no known allergies.    Medications:    Inpatient Meds:  Current Facility-Administered Medications   Medication Dose Route Frequency Provider Last Rate Last Admin    Potassium Standard Replacement Protocol (Levels 3.5 and lower)   Does not apply See Admin Instructions Brenda Mack MD        Magnesium Standard Replacement Protocol   Does not apply See Admin Instructions Brenda Mack MD        Phosphorus Standard Replacement Protocol   Does not apply See Admin Instructions Brenda Mack MD        sodium chloride 0.9 % injection 2 mL  2 mL Intracatheter 2 times per day Brenda Mack MD        enoxaparin (LOVENOX) injection 40 mg  40 mg Subcutaneous Nightly Brenda Mack MD        [START ON 4/6/2024] azithromycin (ZITHROMAX) 500 mg in sodium chloride 0.9 % 250 mL IVPB  500 mg Intravenous Daily Brenda Mack MD        [START ON 4/6/2024] cefTRIAXone (ROCEPHIN) syringe 2,000 mg  2,000 mg Intravenous Daily Brenda Mack MD        ipratropium-albuterol (DUONEB) 0.5-2.5 (3) MG/3ML nebulizer solution 3 mL  3 mL Nebulization 4x Daily Resp Brenda Mack MD        [START ON 4/6/2024] atorvastatin (LIPITOR) tablet 40 mg  40 mg Oral Daily Brenda Mack MD        gabapentin (NEURONTIN) capsule 300 mg  300 mg Oral TID Brenda Mack MD        [START ON 4/6/2024] potassium CHLORIDE (KLOR-CON M) malinda ER tablet 40 mEq  40 mEq Oral Once Raquel Lantigua R, DO        [START ON 4/6/2024] predniSONE (DELTASONE) tablet 40 mg  40 mg Oral Daily with breakfast Brenda Mack MD        [START ON 4/8/2024] aspirin (ECOTRIN) enteric coated tablet 81 mg  81 mg Oral Once per day on Mon Thu Brenda Mack MD          Current Facility-Administered Medications   Medication Dose Route Frequency Provider Last Rate Last Admin    lactated ringers infusion   Intravenous Continuous Brenda Mack MD          Current Facility-Administered Medications   Medication Dose Route Frequency Provider Last Rate Last Admin     acetaminophen (TYLENOL) tablet 650 mg  650 mg Oral Q4H PRN Brenda Mack MD        Or    acetaminophen (TYLENOL) suppository 650 mg  650 mg Rectal Q4H PRN Brenda Mack MD        ondansetron (ZOFRAN ODT) disintegrating tablet 4 mg  4 mg Oral Q6H PRN Brenda Mack MD        Or    ondansetron (ZOFRAN) injection 4 mg  4 mg Intravenous Q6H PRN Brenda Mack MD        polyethylene glycol (MIRALAX) packet 17 g  17 g Oral Daily PRN Brenda Mack MD        docusate sodium-sennosides (SENOKOT S) 50-8.6 MG 2 tablet  2 tablet Oral BID PRN Brenda Mack MD        bisacodyl (DULCOLAX) suppository 10 mg  10 mg Rectal Daily PRN Brenda Mack MD        magnesium hydroxide (MILK OF MAGNESIA) 400 MG/5ML suspension 30 mL  30 mL Oral Daily PRN Brenda Mack MD        melatonin tablet 3 mg  3 mg Oral Nightly PRN Brenda Mack MD        sodium chloride 0.9 % flush bag 25 mL  25 mL Intravenous PRN Brenda Mack MD        sodium chloride (NORMAL SALINE) 0.9 % bolus 500 mL  500 mL Intravenous PRN Brenda Mack MD        ipratropium-albuterol (DUONEB) 0.5-2.5 (3) MG/3ML nebulizer solution 3 mL  3 mL Nebulization Q4H Resp PRN Brenda Mack MD           Home Meds:  Current Outpatient Medications   Medication Instructions    amlodipine-benazepril (LOTREL) 5-10 MG per capsule 1 capsule, Oral, DAILY    aspirin (ECOTRIN) 81 mg, Oral, 2 DAYS A WEEK    atorvastatin (LIPITOR) 40 mg, Oral, DAILY    cholecalciferol (VITAMIN D3) 1,000 Units, Oral, DAILY    Fish Oil 1 g, Oral, DAILY    gabapentin (NEURONTIN) 300 mg, Oral, 3 TIMES DAILY    valsartan-hydrochlorothiazide (DIOVAN-HCT) 160-25 MG per tablet 1 tablet, Oral, DAILY       Review of Systems    12 point review of systems reviewed with patient and is negative except for what is outlined above in history of present illness        OBJECTIVE      VITAL SIGNS:     Vital Last Value 24 Hour Range   Temperature 97.5 °F (36.4 °C) (04/05/24 1841) Temp  Min: 97.5 °F (36.4 °C)  Max: 104.4 °F  (40.2 °C)   Pulse (!) 114 (04/05/24 2023) Pulse  Min: 104  Max: 149   Respiratory 20 (04/05/24 2023) Resp  Min: 18  Max: 30   Non-Invasive  Blood Pressure 117/72 (04/05/24 2023) BP  Min: 117/72  Max: 154/88   Pulse Oximetry 92 % (04/05/24 2023) SpO2  Min: 75 %  Max: 95 %     INTAKE/OUTPUT:  No intake/output data recorded.  No intake/output data recorded.  No intake or output data in the 24 hours ending 04/05/24 2235    Wt Readings from Last 3 Encounters:   04/05/24 100 kg (220 lb 7.4 oz)   12/12/23 102.1 kg (225 lb)   08/31/23 106 kg (233 lb 11 oz)       PHYSICAL EXAM    Physical Exam  Constitutional:       General: He is not in acute distress.     Appearance: He is obese. He is not toxic-appearing.      Comments: Facial flushing   HENT:      Head: Normocephalic.      Nose: Nose normal.      Mouth/Throat:      Mouth: Mucous membranes are moist.      Neck: Normal range of motion.   Eyes:      Conjunctiva/sclera: Conjunctivae normal.      Pupils: Pupils are equal, round, and reactive to light.   Cardiovascular:      Rate and Rhythm: Normal rate and regular rhythm.      Pulses: Normal pulses.      Heart sounds: Normal heart sounds.   Pulmonary:      Effort: Pulmonary effort is normal.      Comments: Diminished breath sounds bilateral  Abdominal:      Palpations: Abdomen is soft.   Musculoskeletal:         General: Normal range of motion.   Skin:     General: Skin is warm and dry.   Neurological:      General: No focal deficit present.      Mental Status: He is alert.   Psychiatric:         Mood and Affect: Mood normal.        Labs     No results found    Recent Labs   Lab 04/05/24  1747   SODIUM 137   POTASSIUM 3.4   CHLORIDE 103   CO2 30   BUN 27*   CREATININE 1.30*   CALCIUM 9.3   BILIRUBIN 1.2*   AST 13   GPT 26   ALKPT 54   TOTPROTEIN 6.8   ALBUMIN 3.0*   GLOB 3.8   GLUCOSE 133*       Recent Labs   Lab 04/05/24  1747   WBC 8.0   RBC 4.83   HGB 15.2   HCT 45.8          Recent Labs   Lab 04/05/24 2101  04/05/24 1747   LACTA 1.4 2.6*   PCT  --  3.98*       No results found    Recent Labs   Lab 04/05/24 1747   PT 11.4   INR 1.1   PTT 32       NT-PRONBP:   Recent Labs   Lab 04/05/24 1747   NT-proBNP 322*     Troponin: No results found  TSH:  No results found for: \"TSH\"    HbA1c: Last Lab A1C:  Hemoglobin A1C (%)   Date Value   08/31/2023 5.6       Last Point of Care A1C:  No results found for: \"QOYKFTGD1O\", \"5GLYH\"  LIPID PANEL: No results found      UA  No results found for: \"UWBC\", \"URBC\"     Microbiology  Microbiology Results       None               Imaging    CT CHEST W CONTRAST   Final Result   1.   Heterogeneous multifocal consolidations are suggestive of multifocal   pneumonia. Follow-up imaging is recommended in 3 months to ensure   resolution of these findings.   2.   Prominent and mildly enlarged mediastinal lymph nodes are similar to   the prior examination and presumed reactive. Recommend attention on   follow-up      Electronically Signed by: BLANCA HIGUERA MD    Signed on: 4/5/2024 9:35 PM    Workstation ID: [id number]      XR CHEST AP OR PA   Final Result      Patchy bilateral lower lobe infiltrates and small left pleural effusion.               Electronically Signed by: ELSY CHING M.D.    Signed on: 4/5/2024 6:21 PM    Workstation ID: [id number]           All labs and tests on this note have been reviewed and analyzed and taken into consideration for patient care    ASSESSMENT AND PLAN       Assessment and Plan  71 year old male with PMH of Hypertension, dyslipidemia, CAD, polycythemia vera, COPD not on home O2, former smoker with 40-pack-year history (quit 2017) presenting to [facility] ED with shortness of breath found to have viral/bacterial sepsis with pulmonary source with acute hypoxic respiratory failure      #Sepsis  #Community-acquired pneumonia  #Acute hypoxic respiratory failure  #COPD exacerbation   #Former smoker with 40-pack-year history (quit 2017)  #History of pulmonary  nodules  -CXR with patchy bilateral lower lobe infiltrates and small left pleural effusion  -Viral versus bacterial pulmonary source  -RVP, MRSA, urine strep/legionella and procalcitonin pending  -Start ceftriaxone and azithromycin  -Trend WBC and fever  -Scheduled and as needed DuoNebs  -Titrate FiO2 for SPO2 greater than 88%  -Prednisone 40 mg x 5 days  -Recommend establish care with outpatient pulmonologist    #Hypertension  #Dyslipidemia  #CAD  -PTA: Valsartan/hydrochlorothiazide and atorvastatin  -Continue atorvastatin    #PAULA  -Creatinine 1.3 with baseline creatinine 0.7-0.8  -Strict I's and O's and daily weights  -Avoid nephrotoxic drugs and NSAIDs  -Holding PTA valsartan    #Polycythemia vera  -Hemoglobin stable  -Continue ASA  -Outpatient follow-up with hematology    Checklist:  Fluids: LR @100  Electrolytes: Will replete as needed   Diet: Regular   DVT Prophylaxis: SCDs, Lovenox  CODE STATUS:   Code Status: Full Resuscitation   PCP: Cameron Price MD   DISPO: Inpatient    Admission Requirements and Anticipated Length of Stay:  The patient IS expected to require a two midnight stay in the hospital.  Admission is required to provide services and treatment only available in the hospital.  Admission is supported by the followin.  The documented complex medical factors and comorbidities.       2.  The severity of signs and symptoms.       3.  The current and anticipated ongoing medical needs.       4.  The potential risk for an adverse event or outcome.    Discussed with ED attending Julian Mack MD  [facility] Hospitalist  Perfect Serve [employer]ach

## 2024-06-03 ENCOUNTER — TELEPHONE (OUTPATIENT)
Dept: FAMILY MEDICINE CLINIC | Facility: CLINIC | Age: 59
End: 2024-06-03

## 2024-06-03 ENCOUNTER — HOSPITAL ENCOUNTER (INPATIENT)
Facility: HOSPITAL | Age: 59
LOS: 6 days | Discharge: HOME | End: 2024-06-11
Attending: EMERGENCY MEDICINE | Admitting: INTERNAL MEDICINE
Payer: COMMERCIAL

## 2024-06-03 ENCOUNTER — APPOINTMENT (OUTPATIENT)
Dept: GENERAL RADIOLOGY | Facility: HOSPITAL | Age: 59
End: 2024-06-03
Payer: COMMERCIAL

## 2024-06-03 DIAGNOSIS — D72.829 LEUKOCYTOSIS, UNSPECIFIED TYPE: ICD-10-CM

## 2024-06-03 DIAGNOSIS — J18.9 PNEUMONIA OF LEFT UPPER LOBE DUE TO INFECTIOUS ORGANISM: Primary | ICD-10-CM

## 2024-06-03 DIAGNOSIS — R74.01 TRANSAMINITIS: ICD-10-CM

## 2024-06-03 DIAGNOSIS — E87.6 HYPOKALEMIA: ICD-10-CM

## 2024-06-03 DIAGNOSIS — E87.1 HYPONATREMIA: ICD-10-CM

## 2024-06-03 DIAGNOSIS — R09.02 HYPOXIA: ICD-10-CM

## 2024-06-03 LAB
ALBUMIN SERPL-MCNC: 3.1 G/DL (ref 3.5–5.2)
ALBUMIN/GLOB SERPL: 1 G/DL
ALP SERPL-CCNC: 105 U/L (ref 39–117)
ALT SERPL W P-5'-P-CCNC: 61 U/L (ref 1–33)
ANION GAP SERPL CALCULATED.3IONS-SCNC: 10 MMOL/L (ref 5–15)
AST SERPL-CCNC: 80 U/L (ref 1–32)
BASOPHILS # BLD AUTO: 0.03 10*3/MM3 (ref 0–0.2)
BASOPHILS NFR BLD AUTO: 0.2 % (ref 0–1.5)
BILIRUB SERPL-MCNC: 0.4 MG/DL (ref 0–1.2)
BUN SERPL-MCNC: 12 MG/DL (ref 6–20)
BUN/CREAT SERPL: 20.7 (ref 7–25)
CALCIUM SPEC-SCNC: 7.9 MG/DL (ref 8.6–10.5)
CHLORIDE SERPL-SCNC: 93 MMOL/L (ref 98–107)
CO2 SERPL-SCNC: 20 MMOL/L (ref 22–29)
CREAT SERPL-MCNC: 0.58 MG/DL (ref 0.57–1)
D-LACTATE SERPL-SCNC: 1.1 MMOL/L (ref 0.5–2)
DEPRECATED RDW RBC AUTO: 41.2 FL (ref 37–54)
EGFRCR SERPLBLD CKD-EPI 2021: 105.7 ML/MIN/1.73
EOSINOPHIL # BLD AUTO: 0.01 10*3/MM3 (ref 0–0.4)
EOSINOPHIL NFR BLD AUTO: 0.1 % (ref 0.3–6.2)
ERYTHROCYTE [DISTWIDTH] IN BLOOD BY AUTOMATED COUNT: 12.9 % (ref 12.3–15.4)
FLUAV SUBTYP SPEC NAA+PROBE: NOT DETECTED
FLUBV RNA ISLT QL NAA+PROBE: NOT DETECTED
GLOBULIN UR ELPH-MCNC: 3.2 GM/DL
GLUCOSE SERPL-MCNC: 148 MG/DL (ref 65–99)
HCT VFR BLD AUTO: 35.2 % (ref 34–46.6)
HGB BLD-MCNC: 11.8 G/DL (ref 12–15.9)
IMM GRANULOCYTES # BLD AUTO: 0.3 10*3/MM3 (ref 0–0.05)
IMM GRANULOCYTES NFR BLD AUTO: 2.1 % (ref 0–0.5)
LYMPHOCYTES # BLD AUTO: 0.84 10*3/MM3 (ref 0.7–3.1)
LYMPHOCYTES NFR BLD AUTO: 5.9 % (ref 19.6–45.3)
MAGNESIUM SERPL-MCNC: 1.9 MG/DL (ref 1.6–2.6)
MCH RBC QN AUTO: 28.7 PG (ref 26.6–33)
MCHC RBC AUTO-ENTMCNC: 33.5 G/DL (ref 31.5–35.7)
MCV RBC AUTO: 85.6 FL (ref 79–97)
MONOCYTES # BLD AUTO: 1.11 10*3/MM3 (ref 0.1–0.9)
MONOCYTES NFR BLD AUTO: 7.8 % (ref 5–12)
NEUTROPHILS NFR BLD AUTO: 11.97 10*3/MM3 (ref 1.7–7)
NEUTROPHILS NFR BLD AUTO: 83.9 % (ref 42.7–76)
PLATELET # BLD AUTO: 263 10*3/MM3 (ref 140–450)
PMV BLD AUTO: 9.5 FL (ref 6–12)
POTASSIUM SERPL-SCNC: 3 MMOL/L (ref 3.5–5.2)
PROT SERPL-MCNC: 6.3 G/DL (ref 6–8.5)
RBC # BLD AUTO: 4.11 10*6/MM3 (ref 3.77–5.28)
SARS-COV-2 RNA RESP QL NAA+PROBE: NOT DETECTED
SODIUM SERPL-SCNC: 123 MMOL/L (ref 136–145)
WBC NRBC COR # BLD AUTO: 14.26 10*3/MM3 (ref 3.4–10.8)

## 2024-06-03 PROCEDURE — 25010000002 CEFTRIAXONE PER 250 MG: Performed by: PHYSICIAN ASSISTANT

## 2024-06-03 PROCEDURE — 25810000003 SODIUM CHLORIDE 0.9 % SOLUTION 250 ML FLEX CONT: Performed by: PHYSICIAN ASSISTANT

## 2024-06-03 PROCEDURE — 25810000003 SODIUM CHLORIDE 0.9 % SOLUTION: Performed by: PHYSICIAN ASSISTANT

## 2024-06-03 PROCEDURE — 85025 COMPLETE CBC W/AUTO DIFF WBC: CPT | Performed by: PHYSICIAN ASSISTANT

## 2024-06-03 PROCEDURE — 36415 COLL VENOUS BLD VENIPUNCTURE: CPT

## 2024-06-03 PROCEDURE — 83735 ASSAY OF MAGNESIUM: CPT | Performed by: PHYSICIAN ASSISTANT

## 2024-06-03 PROCEDURE — 87636 SARSCOV2 & INF A&B AMP PRB: CPT | Performed by: EMERGENCY MEDICINE

## 2024-06-03 PROCEDURE — 71046 X-RAY EXAM CHEST 2 VIEWS: CPT

## 2024-06-03 PROCEDURE — 87040 BLOOD CULTURE FOR BACTERIA: CPT | Performed by: PHYSICIAN ASSISTANT

## 2024-06-03 PROCEDURE — 99285 EMERGENCY DEPT VISIT HI MDM: CPT | Performed by: PHYSICIAN ASSISTANT

## 2024-06-03 PROCEDURE — 83605 ASSAY OF LACTIC ACID: CPT | Performed by: PHYSICIAN ASSISTANT

## 2024-06-03 PROCEDURE — 99285 EMERGENCY DEPT VISIT HI MDM: CPT

## 2024-06-03 PROCEDURE — 80053 COMPREHEN METABOLIC PANEL: CPT | Performed by: PHYSICIAN ASSISTANT

## 2024-06-03 PROCEDURE — 25010000002 AZITHROMYCIN PER 500 MG: Performed by: PHYSICIAN ASSISTANT

## 2024-06-03 RX ORDER — POTASSIUM CHLORIDE 1.5 G/1.58G
40 POWDER, FOR SOLUTION ORAL ONCE
Status: DISCONTINUED | OUTPATIENT
Start: 2024-06-03 | End: 2024-06-03

## 2024-06-03 RX ORDER — POTASSIUM CHLORIDE 750 MG/1
40 TABLET, FILM COATED, EXTENDED RELEASE ORAL ONCE
Status: COMPLETED | OUTPATIENT
Start: 2024-06-03 | End: 2024-06-03

## 2024-06-03 RX ORDER — ACETAMINOPHEN 500 MG
1000 TABLET ORAL ONCE
Status: COMPLETED | OUTPATIENT
Start: 2024-06-03 | End: 2024-06-03

## 2024-06-03 RX ADMIN — SODIUM CHLORIDE 1000 ML: 9 INJECTION, SOLUTION INTRAVENOUS at 17:43

## 2024-06-03 RX ADMIN — POTASSIUM CHLORIDE 40 MEQ: 750 TABLET, EXTENDED RELEASE ORAL at 19:41

## 2024-06-03 RX ADMIN — AZITHROMYCIN MONOHYDRATE 500 MG: 500 INJECTION, POWDER, LYOPHILIZED, FOR SOLUTION INTRAVENOUS at 19:37

## 2024-06-03 RX ADMIN — ACETAMINOPHEN 1000 MG: 500 TABLET ORAL at 15:10

## 2024-06-03 RX ADMIN — CEFTRIAXONE SODIUM 1000 MG: 1 INJECTION, POWDER, FOR SOLUTION INTRAMUSCULAR; INTRAVENOUS at 18:38

## 2024-06-03 NOTE — TELEPHONE ENCOUNTER
Caller: DAMIEN GARCIA    Relationship: Emergency Contact    Best call back number: [phone]     What medication are you requesting: GALO JEAN    What are your current symptoms: CONSTANT COUGH, ACHING, TINGLING IN BACK OF THROAT, STRESSES HOW BAD SHE FEELS    If a prescription is needed, what is your preferred pharmacy and phone number: [facility] [id number] - [facility] [address]  AT [facility] [phone] [facility] [phone] FX     Additional notes: PATIENT FEELS TOO BAD TO COME INTO APPT

## 2024-06-03 NOTE — FSED PROVIDER NOTE
[facility] DISCHARGE SUMMARY          MRN: [id number]    Admission Dt/Tm:     5/2/2023  6:04 PM    Discharge DT/TM:  5-8-23    Discharge Diagnoses:  See problem list below for list of dc diagnoses    Hospital Course:   Per HPI:  Cesar is a 50 year old male who presents to ED for evaluation of persistent fever.  Patient seen and evaluated this evening in ED.  States he started feeling unwell on Saturday.  Has had multiple days of fevers from 10 1-102.5.  Unable to sleep last night due to fever prompting decision to come to ED today.  Reports mild shortness of breath and mild cough.  Takes Humira for rheumatoid arthritis.  No chest pain.  ED work-up concerning for multilobar pneumonia.  Given cefepime and doxycycline.  No recent travel, no sick contacts.    Pt was admitted to [facility] for further w/u and treatment. CT chest showed 1.   Findings likely representing extensive multifocal pneumonia, with  large bilateral lobe consolidations, and moderate groundglass consolidative  opacities in both upper lobes and both lower lobes.  Small left pleural  effusion.  Recommend follow-up imaging until resolution.  2.   Mediastinal and hilar adenopathy, likely reactive due to pneumonia. Pt was septic poa due to legionella pneumonia and +rhino/enterovirus. ID and pulm teams were consulted. abx were modified and he was treated with azithromycin (he will complete 10 days total). His sepsis and fever resolved. He had acute hypoxic resp failure poa, which resolved prior to dc. His hospital course was complicated by hyponatremia (due to SIRs induced pre renal state, NSAIDs, poor solute with high fluid intake & hypovolemia). Nephrology team was consulted and he was treated with 1.5% NS, fluid restriction, and high sodium diet. His hyponatremia improved (na 135 on day of dc). He also developed transaminitis for which GI team was consulted.  Hepatitis panel was neg. US showed: 1.  Unremarkable pancreas and liver. 2.  Subtle nodular  Subjective   History of Present Illness    Patient, history of type 1 diabetes, is complaining of cough, fever, excessively sweating, and generalized bodyaches which started 5 days ago.  Denies any chest pain or shortness of breath.  Denies headache, sore throat, ear pain, abdominal pain.  Denies any dysuria or hematuria.  Denies shortness of breath or chest pain.  Denies any recent sick contacts.    Review of Systems   Constitutional:  Negative for activity change and appetite change.   HENT:  Positive for congestion.    Eyes:  Negative for pain.   Respiratory:  Positive for cough. Negative for shortness of breath.    Gastrointestinal:  Negative for nausea and vomiting.   Musculoskeletal:  Negative for arthralgias.   Skin:  Negative for color change.   Neurological:  Negative for dizziness.   All other systems reviewed and are negative.      Past Medical History:   Diagnosis Date    Diabetes mellitus unknown    pre-diabetes    Diabetes mellitus type I     Hyperlipidemia     Hypertension     Obesity unknown    have lost over 40 pounds       No Known Allergies    History reviewed. No pertinent surgical history.    Family History   Problem Relation Age of Onset    Diabetes Father        Social History     Socioeconomic History    Marital status:    Tobacco Use    Smoking status: Never    Smokeless tobacco: Never   Substance and Sexual Activity    Alcohol use: Yes     Alcohol/week: 2.0 standard drinks of alcohol     Types: 2 Glasses of wine per week     Comment: very little    Drug use: Never    Sexual activity: Yes     Partners: Male     Birth control/protection: Post-menopausal           Objective   Physical Exam  Vitals and nursing note reviewed.   Constitutional:       Appearance: Normal appearance. She is normal weight.   HENT:      Head: Normocephalic and atraumatic.      Nose: Nose normal.      Mouth/Throat:      Mouth: Mucous membranes are moist.   Eyes:      Pupils: Pupils are equal, round, and reactive  mucosal irregularity suspicious for tiny polyps along the gallbladder wall.  No evidence for acute cholecystitis. GI team recommended stopping tylenol. His lfts plateaued. GI team deemed pt stable to dc home. Pt was deemed stable for dc by ID and pulm teams as well. Pt will need close monitoring of his lfts and labs after to dc to ensure lfts normalize and rest of labs remain stable. Pt was given script for blood work 1 week after dc. He should have lfts checked weekly until they normalize. Pt will need to f/u w/ pulm in 2 weeks. He will need repeat ct chest in 2-3 months to monitor for resolution of PNA and lymphadenopathy. Pt can f/u w/ ID as needed. Pt was instructed to not take humira for at least 2-3 months (ct chest will need to be done prior to next dose of humira) and until he is cleared to do so by his pcp and pulmonologist. Pt endorsed understanding.    Of note, UA on admission did show blood in his urine. Pt was given script for repeat UA with his pcp to make sure hematuria has resolved. Pt endorsed understanding.    Pt was dc'd home on 5-8-23.    Problem list:  Sepsis poa due to legionella PNA poa and +rhinovirus/enterovirus infection poa  Immunocompromised patient  -sepsis resolved  -ct scan report: extensive multifocal pneumonia, large b/l lobe consolidations, moderate ground glass consolidative opacities b/l lobes  -repeat cxr report: Slight improvement in bilateral lung airspace infiltrates.  -bcx ngtd  -consult ID, con't abx per their recs, on azithro x 10 days total. Pt deemed stable for dc from ID standpoint.  -f/u rest of infectious work up (hiv neg, beta glucan neg, histo neg, fungal antibodies immunodiffusion neg, fungal abx complement pending, and blasto pending)  -monitor qtc intermittently while on azithro, 459 today      Acute hypoxic respiratory failure poa- resolved, on room air today. 2/2 above  -pulm on consult, appreciate recs, completed course of prednisone for severe pna. No need to  to light.   Cardiovascular:      Rate and Rhythm: Normal rate and regular rhythm.      Pulses: Normal pulses.      Heart sounds: Normal heart sounds.   Pulmonary:      Effort: Pulmonary effort is normal.      Breath sounds: Normal breath sounds. Examination of the left-upper field reveals decreased breath sounds. Examination of the left-middle field reveals decreased breath sounds.   Musculoskeletal:         General: Normal range of motion.      Cervical back: Normal range of motion.      Right lower leg: No edema.      Left lower leg: No edema.   Skin:     General: Skin is warm.   Neurological:      General: No focal deficit present.      Mental Status: She is alert.   Psychiatric:         Behavior: Behavior is cooperative.         Procedures           ED Course  ED Course as of 06/03/24 1907 Mon Jun 03, 2024   1757 WBC(!): 14.26 [SS]   1840 Paged [facility] at this time. [SS]   1904 I discussed with Dr. Crawford [facility] regarding patient.  Agrees admit patient all questions addressed at this time. [SS]      ED Course User Index  [SS] Janel Barr PA-C                                           Medical Decision Making  Problems Addressed:  Hypokalemia: complicated acute illness or injury  Hyponatremia: complicated acute illness or injury  Hypoxia: complicated acute illness or injury  Leukocytosis, unspecified type: complicated acute illness or injury  Pneumonia of left upper lobe due to infectious organism: complicated acute illness or injury    Amount and/or Complexity of Data Reviewed  Labs: ordered. Decision-making details documented in ED Course.  Radiology: ordered.    Risk  OTC drugs.  Prescription drug management.  Decision regarding hospitalization.        Final diagnoses:   Pneumonia of left upper lobe due to infectious organism   Leukocytosis, unspecified type   Hypoxia   Hyponatremia   Hypokalemia       ED Disposition  ED Disposition       ED Disposition   Decision to Admit    Condition   --    Comment   Level  dc home w/ nebs per pulm recs  -encourage IS  -guaifenesin prn  -pt will need to f/u w/ pulm in ~2 weeks. Pt will need repeat ct chest in 2-3 months to look for resolution of infiltrates and improvement in LAD. D/w pt who endorsed understanding.     Leukocytosis- resolved/stable  -pt clinically improving     Hyponatremia poa- improving. multifactorial, 2/t SIRs induced pre renal state, NSAIDs, poor solute with high fluid intake & hypovolemia  Hypophosphatemia  hypokalemia  -pt was seen by nephro, they signed off.  today so no need for FR at time of dc per nephro recs.  -no nsaids  -monitor sodium closely after dc (script given for bmp in 1 week)     RA (rheumatoid arthritis) (CMD)  -Hold Humira. Explained to pt he will need to hold next humira dose for at least 2-3 months until pulm okays next dose (will depend on ct chest results)     Diarrhea- possibly related to legionella infection, resolved     transaminitis-appears to be plateauing. no abd pain. Possibly due to legionella and/or viral infection  -gi team consulted, d/w them today, deemed stable for dc from gi standpoint. Pt needs close monitoring of lfts (weekly until they normalize).  -no tylenol or etoh  -gb us: unremarkable pancreas and liver. Subtle nodular mucosal irregularity suspicious for tiny polyps along gb wall. No evidence for acute marco. D/w GI team today, no need for f/u for the polyps  -hepatitis panel neg  -cpk wnl, tylenol level low     Ct scan w/ mediastinal and hilar adenopathy, likely reactive due to pneumonia  -recommend f/u ct scan in 2-3 months to monitor for resolution of LN's, d/w pt who endorsed understanding     Hepatic steatosis seen on ct scan     Blood in urine- recommend repeat ua w/ pcp after acute illness is over to look for resolution. If persistent then recommend urology follow up. D/w pt who endorsed understanding     Lab Results:  Recent Results (from the past 24 hour(s))   Prothrombin Time    Collection Time:  of Care: Telemetry [5]   Diagnosis: Left upper lobe pneumonia [180266]   Admitting Physician: TALAT CARO [[id number]]   Attending Physician: TALAT CARO [[id number]]                 No follow-up provider specified.       Medication List        Changed      glucagon 1 MG injection  Commonly known as: GLUCAGEN  For emergency use for severe low glucose: first inject, then call 911, once awake try to give oral treatment.  What changed: additional instructions     ReliOn Pen Needles 32G X 4 MM misc  Generic drug: Insulin Pen Needle  For use with pen device up to 5 times a day. Can substitute based on availability and insurance. Dx E11.65  What changed: additional instructions               05/07/23 11:24 AM   Result Value Ref Range    Prothrombin Time 10.9 9.7 - 11.8 sec    INR 1.1     Acetaminophen Level    Collection Time: 05/07/23  1:22 PM   Result Value Ref Range    Acetaminophen 2 (L) 10 - 30 mcg/mL   Basic Metabolic Panel    Collection Time: 05/08/23  4:05 AM   Result Value Ref Range    Fasting Status      Sodium 135 135 - 145 mmol/L    Potassium 3.6 3.4 - 5.1 mmol/L    Chloride 105 97 - 110 mmol/L    Carbon Dioxide 24 21 - 32 mmol/L    Anion Gap 10 7 - 19 mmol/L    Glucose 123 (H) 70 - 99 mg/dL    BUN 19 6 - 20 mg/dL    Creatinine 0.68 0.67 - 1.17 mg/dL    Glomerular Filtration Rate >90 >=60    BUN/Cr 28 (H) 7 - 25    Calcium 8.1 (L) 8.4 - 10.2 mg/dL   Hepatic Function Panel    Collection Time: 05/08/23  4:05 AM   Result Value Ref Range    Albumin 1.8 (L) 3.6 - 5.1 g/dL    Bilirubin, Total 0.4 0.2 - 1.0 mg/dL    Bilirubin, Direct 0.1 0.0 - 0.2 mg/dL    Alkaline Phosphatase 78 45 - 117 Units/L    GPT/ (H) <64 Units/L    GOT/ (H) <=37 Units/L    Protein, Total 5.9 (L) 6.4 - 8.2 g/dL   Magnesium    Collection Time: 05/08/23  4:05 AM   Result Value Ref Range    Magnesium 2.2 1.7 - 2.4 mg/dL   Phosphorus    Collection Time: 05/08/23  4:05 AM   Result Value Ref Range    Phosphorus 3.2 2.4 - 4.7 mg/dL   CBC with Automated Differential (performable only)    Collection Time: 05/08/23  4:05 AM   Result Value Ref Range    WBC 11.0 4.2 - 11.0 K/mcL    RBC 3.54 (L) 4.50 - 5.90 mil/mcL    HGB 10.6 (L) 13.0 - 17.0 g/dL    HCT 30.5 (L) 39.0 - 51.0 %    MCV 86.2 78.0 - 100.0 fl    MCH 29.9 26.0 - 34.0 pg    MCHC 34.8 32.0 - 36.5 g/dL    RDW-CV 14.2 11.0 - 15.0 %    RDW-SD 44.9 39.0 - 50.0 fL     140 - 450 K/mcL    NRBC 0 <=0 /100 WBC   Manual Differential    Collection Time: 05/08/23  4:05 AM   Result Value Ref Range    Neutrophil, Percent 68 %    Lymphocytes, Percent 16 %    Mono, Percent 8 %    Eosinophils, Percent 1 %    Basophils, Percent 0 %    Bands, Percent 1 0 - 10 %     Metamyelocytes, Percent  2 0 - 2 %    Myelocytes, Percent 2 (H) <=0 %    Reactive Lymphocytes, Percent 2 0 - 5 %    Absolute Neutrophil 7.6 1.8 - 7.7 K/mcL    Absolute Lymphocytes 2.0 1.0 - 4.8 K/mcL    Absolute Monocytes 0.9 0.3 - 0.9 K/mcL    Absolute Eosinophils 0.1 0.0 - 0.5 K/mcL    Absolute Basophils 0.0 0.0 - 0.3 K/mcL    RBC Morphology Normal Normal    WBC Morphology Normal Normal    Platelet Morphology Normal Normal       Imaging:  XR CHEST AP OR PA   Final Result       Slight improvement in bilateral lung airspace infiltrates.         Electronically Signed by: CINDY LEONARD M.D.    Signed on: 5/7/2023 9:47 AM    Workstation ID: [id number]      US LIVER GALLBLADDER PANCREAS (RUQ)   Final Result          1.  Unremarkable pancreas and liver.   2.  Subtle nodular mucosal irregularity suspicious for tiny polyps along   the gallbladder wall.  No evidence for acute cholecystitis.         Electronically Signed by: CINDY LEONARD M.D.    Signed on: 5/7/2023 9:32 AM    Workstation ID: [id number]      XR CHEST AP OR PA   Final Result       Bilateral pneumonia.         Electronically Signed by: CINDY LEONARD M.D.    Signed on: 5/6/2023 8:53 AM    Workstation ID: [id number]      CT CHEST WO CONTRAST   Final Result   1.   Findings likely representing extensive multifocal pneumonia, with   large bilateral lobe consolidations, and moderate groundglass consolidative   opacities in both upper lobes and both lower lobes.  Small left pleural   effusion.  Recommend follow-up imaging until resolution.   2.   Mediastinal and hilar adenopathy, likely reactive due to pneumonia.      Electronically Signed by: JAVED GERBER M.D.    Signed on: 5/2/2023 11:26 PM    Workstation ID: [id number]      XR CHEST PA AND LATERAL 2 VIEWS   Final Result       Bilateral upper lobe airspace infiltrates.         Electronically Signed by: CINDY LEONARD M.D.    Signed on: 5/2/2023 7:04 PM    Workstation ID: [id number]            Pathology/CX results:  * Cannot find OR log *    Microbiology Results  (Last 10 results in the past 7 days)    Specimen   Gram Smear   Culture Result   Status       05/03/23  3831         Adequate quality specimen. Acute inflammation with moderate/many neutrophils. Mixed bacteria with no predominant type.                 Test Results Pending At Discharge:  Pending Labs     Order Current Status    Blastomyces Antigen, EIA In process    Fungal Antibodies, Complement Fixation In process    Fungal Antibodies, Immunodiffusion In process      pt was seen and examined on day of dc. Pt looks very good. He denies sob, cough, fox, chest pain, lightheaded/dizziness, palpitations, pleuritic pain. Appetite good. No diarrhea, no abd pain.    Pertinent Physical Exam At Time of Discharge:  General: NAD  Head: normocephalic  Eyes: b/l pupils equal in size  Ears, nose, throat: MM moist  Neck: supple  Chest: respirations non labored, CTAB. No wheezing or rhonchi  Cardiac: RRR, no murmur  Abdomen: soft, not distended, nttp, +BS. No ruq ttp  Musculoskeletal: moving all extremities  Neurologic: a/o x 3  Vascular: no peripheral pitting edema, calves nttp  Skin: warm, dry, pink, no pallor  Psychiatric: calm    Discharge Medications:     Summary of your Discharge Medications      Take these Medications      Details   albuterol 108 (90 Base) MCG/ACT inhaler   Inhale 2 puffs into the lungs every 4 hours as needed for Shortness of Breath or Wheezing.     DISPENSE   GNC akash men sport ( Multivitamin)   Take 1 tablet every day     guaiFENesin-DM  MG per 12 hr tablet  Commonly known as: MUCINEX DM   Take 2 tablets by mouth every 12 hours as needed (cough).     melatonin 3 MG   Take 1 tablet by mouth at bedtime as needed (Sleep).              Discharge Instructions:  Follow-up with primary care physician in 3-5 days.  Repeat cbc, bmp, lfts in 1 week (script given). Needs weekly lfts until they are normalized.  Take medication as  prescribed.  Do not take tylenol at home until your liver function tests normalize.    Abstain from alcohol.    Do not take humira until you are cleared to do so by your pcp and pulmonologist (need to wait at least 2 months before next humira dose). You will need repeat ct chest in 2-3 months (to be ordered by pulmonologist) to monitor for resolution of pneumonia and lymph nodes before you can receive humira. Your pulmonologist or pcp will order ct chest.    You need repeat urinalysis in ~4 weeks w/ your pcp to monitor for resolution of blood seen in your urine.    Return to hospital if you develop fever, worsening cough, or shortness of breath.    You need your liver function tests check weekly with your pcp until they normalize.    Primary Care Physician:  Timmy Serna MD    Will notify pcp    I spent more than 30 minutes on patient discharge.       Leslie Roberts MD-d/w GI, ID, pulm teams on day of dc, all deemed pt stable for dc  [facility]ist  5/8/2023 8:52 AM

## 2024-06-03 NOTE — TELEPHONE ENCOUNTER
I talked to patient's spouse he is going to take her to urgent care for further evaluation  Daja duenas advised

## 2024-06-03 NOTE — TELEPHONE ENCOUNTER
Hub staff attempted to follow warm transfer process and was unsuccessful     Caller: DAMIEN GARCIA    Relationship to patient: Emergency Contact      PATIENTS  IS CALLING FOR AN UPDATE OF MEDICATION REQUEST

## 2024-06-03 NOTE — TELEPHONE ENCOUNTER
Tried to call back, no answer.  The soonest video appointment is Thursday.  Will forward info to Daja

## 2024-06-04 PROBLEM — E87.1 HYPONATREMIA: Status: ACTIVE | Noted: 2024-06-04

## 2024-06-04 PROBLEM — E87.6 HYPOKALEMIA: Status: ACTIVE | Noted: 2024-06-04

## 2024-06-04 LAB
ADV 40+41 DNA STL QL NAA+NON-PROBE: NOT DETECTED
ALBUMIN SERPL-MCNC: 3.1 G/DL (ref 3.5–5.2)
ALBUMIN/GLOB SERPL: 1 G/DL
ALP SERPL-CCNC: 110 U/L (ref 39–117)
ALT SERPL W P-5'-P-CCNC: 62 U/L (ref 1–33)
ANION GAP SERPL CALCULATED.3IONS-SCNC: 10.8 MMOL/L (ref 5–15)
ANION GAP SERPL CALCULATED.3IONS-SCNC: 13.1 MMOL/L (ref 5–15)
ANION GAP SERPL CALCULATED.3IONS-SCNC: 9 MMOL/L (ref 5–15)
AST SERPL-CCNC: 74 U/L (ref 1–32)
ASTRO TYP 1-8 RNA STL QL NAA+NON-PROBE: NOT DETECTED
B PARAPERT DNA SPEC QL NAA+PROBE: NOT DETECTED
B PERT DNA SPEC QL NAA+PROBE: NOT DETECTED
BASOPHILS # BLD AUTO: 0.04 10*3/MM3 (ref 0–0.2)
BASOPHILS NFR BLD AUTO: 0.3 % (ref 0–1.5)
BILIRUB SERPL-MCNC: 0.3 MG/DL (ref 0–1.2)
BUN SERPL-MCNC: 10 MG/DL (ref 6–20)
BUN SERPL-MCNC: 12 MG/DL (ref 6–20)
BUN SERPL-MCNC: 9 MG/DL (ref 6–20)
BUN/CREAT SERPL: 16.9 (ref 7–25)
BUN/CREAT SERPL: 17 (ref 7–25)
BUN/CREAT SERPL: 19.7 (ref 7–25)
C CAYETANENSIS DNA STL QL NAA+NON-PROBE: NOT DETECTED
C COLI+JEJ+UPSA DNA STL QL NAA+NON-PROBE: NOT DETECTED
C DIFF TOX GENS STL QL NAA+PROBE: NEGATIVE
C PNEUM DNA NPH QL NAA+NON-PROBE: NOT DETECTED
CALCIUM SPEC-SCNC: 7.9 MG/DL (ref 8.6–10.5)
CALCIUM SPEC-SCNC: 8 MG/DL (ref 8.6–10.5)
CALCIUM SPEC-SCNC: 8.6 MG/DL (ref 8.6–10.5)
CHLORIDE SERPL-SCNC: 92 MMOL/L (ref 98–107)
CHLORIDE SERPL-SCNC: 92 MMOL/L (ref 98–107)
CHLORIDE SERPL-SCNC: 93 MMOL/L (ref 98–107)
CO2 SERPL-SCNC: 21.9 MMOL/L (ref 22–29)
CO2 SERPL-SCNC: 24 MMOL/L (ref 22–29)
CO2 SERPL-SCNC: 24.2 MMOL/L (ref 22–29)
CREAT SERPL-MCNC: 0.53 MG/DL (ref 0.57–1)
CREAT SERPL-MCNC: 0.59 MG/DL (ref 0.57–1)
CREAT SERPL-MCNC: 0.61 MG/DL (ref 0.57–1)
CRYPTOSP DNA STL QL NAA+NON-PROBE: NOT DETECTED
DEPRECATED RDW RBC AUTO: 40.8 FL (ref 37–54)
E HISTOLYT DNA STL QL NAA+NON-PROBE: NOT DETECTED
EAEC PAA PLAS AGGR+AATA ST NAA+NON-PRB: NOT DETECTED
EC STX1+STX2 GENES STL QL NAA+NON-PROBE: NOT DETECTED
EGFRCR SERPLBLD CKD-EPI 2021: 104.4 ML/MIN/1.73
EGFRCR SERPLBLD CKD-EPI 2021: 105.3 ML/MIN/1.73
EGFRCR SERPLBLD CKD-EPI 2021: 108 ML/MIN/1.73
EOSINOPHIL # BLD AUTO: 0.04 10*3/MM3 (ref 0–0.4)
EOSINOPHIL NFR BLD AUTO: 0.3 % (ref 0.3–6.2)
EPEC EAE GENE STL QL NAA+NON-PROBE: NOT DETECTED
ERYTHROCYTE [DISTWIDTH] IN BLOOD BY AUTOMATED COUNT: 13.1 % (ref 12.3–15.4)
ETEC LTA+ST1A+ST1B TOX ST NAA+NON-PROBE: NOT DETECTED
FLUAV SUBTYP SPEC NAA+PROBE: NOT DETECTED
FLUBV RNA ISLT QL NAA+PROBE: NOT DETECTED
G LAMBLIA DNA STL QL NAA+NON-PROBE: NOT DETECTED
GLOBULIN UR ELPH-MCNC: 3.2 GM/DL
GLUCOSE BLDC GLUCOMTR-MCNC: 217 MG/DL (ref 70–130)
GLUCOSE BLDC GLUCOMTR-MCNC: 229 MG/DL (ref 70–130)
GLUCOSE BLDC GLUCOMTR-MCNC: 233 MG/DL (ref 70–130)
GLUCOSE BLDC GLUCOMTR-MCNC: 250 MG/DL (ref 70–130)
GLUCOSE BLDC GLUCOMTR-MCNC: 277 MG/DL (ref 70–130)
GLUCOSE SERPL-MCNC: 201 MG/DL (ref 65–99)
GLUCOSE SERPL-MCNC: 229 MG/DL (ref 65–99)
GLUCOSE SERPL-MCNC: 232 MG/DL (ref 65–99)
HADV DNA SPEC NAA+PROBE: NOT DETECTED
HCOV 229E RNA SPEC QL NAA+PROBE: NOT DETECTED
HCOV HKU1 RNA SPEC QL NAA+PROBE: NOT DETECTED
HCOV NL63 RNA SPEC QL NAA+PROBE: NOT DETECTED
HCOV OC43 RNA SPEC QL NAA+PROBE: NOT DETECTED
HCT VFR BLD AUTO: 30.4 % (ref 34–46.6)
HGB BLD-MCNC: 10.2 G/DL (ref 12–15.9)
HMPV RNA NPH QL NAA+NON-PROBE: NOT DETECTED
HPIV1 RNA ISLT QL NAA+PROBE: NOT DETECTED
HPIV2 RNA SPEC QL NAA+PROBE: NOT DETECTED
HPIV3 RNA NPH QL NAA+PROBE: NOT DETECTED
HPIV4 P GENE NPH QL NAA+PROBE: NOT DETECTED
IMM GRANULOCYTES # BLD AUTO: 0.46 10*3/MM3 (ref 0–0.05)
IMM GRANULOCYTES NFR BLD AUTO: 3.6 % (ref 0–0.5)
L PNEUMO1 AG UR QL IA: NEGATIVE
LYMPHOCYTES # BLD AUTO: 0.98 10*3/MM3 (ref 0.7–3.1)
LYMPHOCYTES NFR BLD AUTO: 7.6 % (ref 19.6–45.3)
M PNEUMO IGG SER IA-ACNC: NOT DETECTED
MAGNESIUM SERPL-MCNC: 2.1 MG/DL (ref 1.6–2.6)
MCH RBC QN AUTO: 29 PG (ref 26.6–33)
MCHC RBC AUTO-ENTMCNC: 33.6 G/DL (ref 31.5–35.7)
MCV RBC AUTO: 86.4 FL (ref 79–97)
MONOCYTES # BLD AUTO: 1.03 10*3/MM3 (ref 0.1–0.9)
MONOCYTES NFR BLD AUTO: 8 % (ref 5–12)
MRSA DNA SPEC QL NAA+PROBE: NORMAL
NEUTROPHILS NFR BLD AUTO: 10.32 10*3/MM3 (ref 1.7–7)
NEUTROPHILS NFR BLD AUTO: 80.2 % (ref 42.7–76)
NOROVIRUS GI+II RNA STL QL NAA+NON-PROBE: NOT DETECTED
NRBC BLD AUTO-RTO: 0 /100 WBC (ref 0–0.2)
OSMOLALITY SERPL: 272 MOSM/KG (ref 275–300)
OSMOLALITY UR: 539 MOSM/KG
P SHIGELLOIDES DNA STL QL NAA+NON-PROBE: NOT DETECTED
PLATELET # BLD AUTO: 277 10*3/MM3 (ref 140–450)
PMV BLD AUTO: 8.9 FL (ref 6–12)
POTASSIUM SERPL-SCNC: 3.4 MMOL/L (ref 3.5–5.2)
POTASSIUM SERPL-SCNC: 3.9 MMOL/L (ref 3.5–5.2)
POTASSIUM SERPL-SCNC: 4.2 MMOL/L (ref 3.5–5.2)
PROCALCITONIN SERPL-MCNC: 0.24 NG/ML (ref 0–0.25)
PROT SERPL-MCNC: 6.3 G/DL (ref 6–8.5)
RBC # BLD AUTO: 3.52 10*6/MM3 (ref 3.77–5.28)
RHINOVIRUS RNA SPEC NAA+PROBE: NOT DETECTED
RSV RNA NPH QL NAA+NON-PROBE: NOT DETECTED
RVA RNA STL QL NAA+NON-PROBE: NOT DETECTED
S ENT+BONG DNA STL QL NAA+NON-PROBE: NOT DETECTED
S PNEUM AG SPEC QL LA: NEGATIVE
SAPO I+II+IV+V RNA STL QL NAA+NON-PROBE: NOT DETECTED
SARS-COV-2 RNA NPH QL NAA+NON-PROBE: NOT DETECTED
SHIGELLA SP+EIEC IPAH ST NAA+NON-PROBE: NOT DETECTED
SODIUM SERPL-SCNC: 125 MMOL/L (ref 136–145)
SODIUM SERPL-SCNC: 127 MMOL/L (ref 136–145)
SODIUM SERPL-SCNC: 128 MMOL/L (ref 136–145)
SODIUM UR-SCNC: <20 MMOL/L
TSH SERPL DL<=0.05 MIU/L-ACNC: 3.71 UIU/ML (ref 0.27–4.2)
URATE SERPL-MCNC: 3.5 MG/DL (ref 2.4–5.7)
V CHOL+PARA+VUL DNA STL QL NAA+NON-PROBE: NOT DETECTED
V CHOLERAE DNA STL QL NAA+NON-PROBE: NOT DETECTED
WBC NRBC COR # BLD AUTO: 12.87 10*3/MM3 (ref 3.4–10.8)
Y ENTEROCOL DNA STL QL NAA+NON-PROBE: NOT DETECTED

## 2024-06-04 PROCEDURE — 25810000003 SODIUM CHLORIDE 0.9 % SOLUTION: Performed by: NURSE PRACTITIONER

## 2024-06-04 PROCEDURE — 87493 C DIFF AMPLIFIED PROBE: CPT | Performed by: INTERNAL MEDICINE

## 2024-06-04 PROCEDURE — 84550 ASSAY OF BLOOD/URIC ACID: CPT | Performed by: INTERNAL MEDICINE

## 2024-06-04 PROCEDURE — G0378 HOSPITAL OBSERVATION PER HR: HCPCS

## 2024-06-04 PROCEDURE — 25010000002 CEFTRIAXONE PER 250 MG: Performed by: INTERNAL MEDICINE

## 2024-06-04 PROCEDURE — 84443 ASSAY THYROID STIM HORMONE: CPT | Performed by: INTERNAL MEDICINE

## 2024-06-04 PROCEDURE — 94799 UNLISTED PULMONARY SVC/PX: CPT

## 2024-06-04 PROCEDURE — 87449 NOS EACH ORGANISM AG IA: CPT | Performed by: NURSE PRACTITIONER

## 2024-06-04 PROCEDURE — 87507 IADNA-DNA/RNA PROBE TQ 12-25: CPT | Performed by: INTERNAL MEDICINE

## 2024-06-04 PROCEDURE — 85025 COMPLETE CBC W/AUTO DIFF WBC: CPT | Performed by: NURSE PRACTITIONER

## 2024-06-04 PROCEDURE — 83735 ASSAY OF MAGNESIUM: CPT | Performed by: NURSE PRACTITIONER

## 2024-06-04 PROCEDURE — 82948 REAGENT STRIP/BLOOD GLUCOSE: CPT

## 2024-06-04 PROCEDURE — 94761 N-INVAS EAR/PLS OXIMETRY MLT: CPT

## 2024-06-04 PROCEDURE — 84145 PROCALCITONIN (PCT): CPT | Performed by: INTERNAL MEDICINE

## 2024-06-04 PROCEDURE — 63710000001 INSULIN LISPRO (HUMAN) PER 5 UNITS: Performed by: NURSE PRACTITIONER

## 2024-06-04 PROCEDURE — 87641 MR-STAPH DNA AMP PROBE: CPT | Performed by: NURSE PRACTITIONER

## 2024-06-04 PROCEDURE — 80053 COMPREHEN METABOLIC PANEL: CPT | Performed by: INTERNAL MEDICINE

## 2024-06-04 PROCEDURE — 0202U NFCT DS 22 TRGT SARS-COV-2: CPT | Performed by: NURSE PRACTITIONER

## 2024-06-04 PROCEDURE — 83935 ASSAY OF URINE OSMOLALITY: CPT | Performed by: INTERNAL MEDICINE

## 2024-06-04 PROCEDURE — 83930 ASSAY OF BLOOD OSMOLALITY: CPT | Performed by: INTERNAL MEDICINE

## 2024-06-04 PROCEDURE — 84300 ASSAY OF URINE SODIUM: CPT | Performed by: INTERNAL MEDICINE

## 2024-06-04 PROCEDURE — 63710000001 INSULIN GLARGINE PER 5 UNITS: Performed by: NURSE PRACTITIONER

## 2024-06-04 RX ORDER — SODIUM CHLORIDE 0.9 % (FLUSH) 0.9 %
10 SYRINGE (ML) INJECTION AS NEEDED
Status: DISCONTINUED | OUTPATIENT
Start: 2024-06-04 | End: 2024-06-11 | Stop reason: HOSPADM

## 2024-06-04 RX ORDER — SODIUM CHLORIDE 9 MG/ML
40 INJECTION, SOLUTION INTRAVENOUS AS NEEDED
Status: DISCONTINUED | OUTPATIENT
Start: 2024-06-04 | End: 2024-06-11 | Stop reason: HOSPADM

## 2024-06-04 RX ORDER — IBUPROFEN 600 MG/1
1 TABLET ORAL
Status: DISCONTINUED | OUTPATIENT
Start: 2024-06-04 | End: 2024-06-11 | Stop reason: HOSPADM

## 2024-06-04 RX ORDER — BISACODYL 10 MG
10 SUPPOSITORY, RECTAL RECTAL DAILY PRN
Status: DISCONTINUED | OUTPATIENT
Start: 2024-06-04 | End: 2024-06-11 | Stop reason: HOSPADM

## 2024-06-04 RX ORDER — ACETAMINOPHEN 160 MG/5ML
650 SOLUTION ORAL EVERY 4 HOURS PRN
Status: DISCONTINUED | OUTPATIENT
Start: 2024-06-04 | End: 2024-06-11 | Stop reason: HOSPADM

## 2024-06-04 RX ORDER — DEXTROSE MONOHYDRATE 25 G/50ML
25 INJECTION, SOLUTION INTRAVENOUS
Status: DISCONTINUED | OUTPATIENT
Start: 2024-06-04 | End: 2024-06-11 | Stop reason: HOSPADM

## 2024-06-04 RX ORDER — BISACODYL 5 MG/1
5 TABLET, DELAYED RELEASE ORAL DAILY PRN
Status: DISCONTINUED | OUTPATIENT
Start: 2024-06-04 | End: 2024-06-11 | Stop reason: HOSPADM

## 2024-06-04 RX ORDER — NICOTINE POLACRILEX 4 MG
15 LOZENGE BUCCAL
Status: DISCONTINUED | OUTPATIENT
Start: 2024-06-04 | End: 2024-06-11 | Stop reason: HOSPADM

## 2024-06-04 RX ORDER — ONDANSETRON 4 MG/1
4 TABLET, ORALLY DISINTEGRATING ORAL EVERY 6 HOURS PRN
Status: DISCONTINUED | OUTPATIENT
Start: 2024-06-04 | End: 2024-06-11 | Stop reason: HOSPADM

## 2024-06-04 RX ORDER — POTASSIUM CHLORIDE 750 MG/1
40 TABLET, FILM COATED, EXTENDED RELEASE ORAL EVERY 4 HOURS
Status: COMPLETED | OUTPATIENT
Start: 2024-06-04 | End: 2024-06-04

## 2024-06-04 RX ORDER — INSULIN LISPRO 100 [IU]/ML
2-7 INJECTION, SOLUTION INTRAVENOUS; SUBCUTANEOUS
Status: DISCONTINUED | OUTPATIENT
Start: 2024-06-04 | End: 2024-06-05

## 2024-06-04 RX ORDER — SERTRALINE HYDROCHLORIDE 100 MG/1
100 TABLET, FILM COATED ORAL DAILY
Status: DISCONTINUED | OUTPATIENT
Start: 2024-06-04 | End: 2024-06-11 | Stop reason: HOSPADM

## 2024-06-04 RX ORDER — GUAIFENESIN 600 MG/1
1200 TABLET, EXTENDED RELEASE ORAL EVERY 12 HOURS SCHEDULED
Status: DISCONTINUED | OUTPATIENT
Start: 2024-06-04 | End: 2024-06-11 | Stop reason: HOSPADM

## 2024-06-04 RX ORDER — SENNOSIDES 8.6 MG
650 CAPSULE ORAL 4 TIMES DAILY PRN
COMMUNITY

## 2024-06-04 RX ORDER — GUAIFENESIN 200 MG/10ML
200 LIQUID ORAL EVERY 4 HOURS PRN
Status: DISCONTINUED | OUTPATIENT
Start: 2024-06-04 | End: 2024-06-11 | Stop reason: HOSPADM

## 2024-06-04 RX ORDER — HYDROCHLOROTHIAZIDE 12.5 MG/1
12.5 TABLET ORAL DAILY
Status: DISCONTINUED | OUTPATIENT
Start: 2024-06-04 | End: 2024-06-04

## 2024-06-04 RX ORDER — ACETAMINOPHEN 650 MG/1
650 SUPPOSITORY RECTAL EVERY 4 HOURS PRN
Status: DISCONTINUED | OUTPATIENT
Start: 2024-06-04 | End: 2024-06-11 | Stop reason: HOSPADM

## 2024-06-04 RX ORDER — DEXTROMETHORPHAN POLISTIREX 30 MG/5ML
60 SUSPENSION ORAL EVERY 12 HOURS SCHEDULED
Status: DISCONTINUED | OUTPATIENT
Start: 2024-06-04 | End: 2024-06-11 | Stop reason: HOSPADM

## 2024-06-04 RX ORDER — ACETAMINOPHEN 325 MG/1
650 TABLET ORAL EVERY 4 HOURS PRN
Status: DISCONTINUED | OUTPATIENT
Start: 2024-06-04 | End: 2024-06-11 | Stop reason: HOSPADM

## 2024-06-04 RX ORDER — SODIUM CHLORIDE 9 MG/ML
100 INJECTION, SOLUTION INTRAVENOUS CONTINUOUS
Status: DISCONTINUED | OUTPATIENT
Start: 2024-06-04 | End: 2024-06-05

## 2024-06-04 RX ORDER — ASPIRIN 81 MG/1
81 TABLET, CHEWABLE ORAL DAILY
Status: DISCONTINUED | OUTPATIENT
Start: 2024-06-04 | End: 2024-06-11 | Stop reason: HOSPADM

## 2024-06-04 RX ORDER — LOPERAMIDE HYDROCHLORIDE 2 MG/1
2 CAPSULE ORAL 4 TIMES DAILY PRN
Status: DISCONTINUED | OUTPATIENT
Start: 2024-06-04 | End: 2024-06-11 | Stop reason: HOSPADM

## 2024-06-04 RX ORDER — NITROGLYCERIN 0.4 MG/1
0.4 TABLET SUBLINGUAL
Status: DISCONTINUED | OUTPATIENT
Start: 2024-06-04 | End: 2024-06-11 | Stop reason: HOSPADM

## 2024-06-04 RX ORDER — LISINOPRIL 2.5 MG/1
2.5 TABLET ORAL DAILY
Status: DISCONTINUED | OUTPATIENT
Start: 2024-06-04 | End: 2024-06-04

## 2024-06-04 RX ORDER — FLUTICASONE PROPIONATE 50 MCG
2 SPRAY, SUSPENSION (ML) NASAL DAILY
Status: DISCONTINUED | OUTPATIENT
Start: 2024-06-04 | End: 2024-06-11 | Stop reason: HOSPADM

## 2024-06-04 RX ORDER — POLYETHYLENE GLYCOL 3350 17 G/17G
17 POWDER, FOR SOLUTION ORAL DAILY PRN
Status: DISCONTINUED | OUTPATIENT
Start: 2024-06-04 | End: 2024-06-11 | Stop reason: HOSPADM

## 2024-06-04 RX ORDER — ATORVASTATIN CALCIUM 80 MG/1
80 TABLET, FILM COATED ORAL DAILY
Status: DISCONTINUED | OUTPATIENT
Start: 2024-06-04 | End: 2024-06-04

## 2024-06-04 RX ORDER — SODIUM CHLORIDE 0.9 % (FLUSH) 0.9 %
10 SYRINGE (ML) INJECTION EVERY 12 HOURS SCHEDULED
Status: DISCONTINUED | OUTPATIENT
Start: 2024-06-04 | End: 2024-06-11 | Stop reason: HOSPADM

## 2024-06-04 RX ORDER — AMOXICILLIN 250 MG
2 CAPSULE ORAL 2 TIMES DAILY PRN
Status: DISCONTINUED | OUTPATIENT
Start: 2024-06-04 | End: 2024-06-11 | Stop reason: HOSPADM

## 2024-06-04 RX ADMIN — POTASSIUM CHLORIDE 40 MEQ: 750 TABLET, EXTENDED RELEASE ORAL at 11:34

## 2024-06-04 RX ADMIN — INSULIN LISPRO 4 UNITS: 100 INJECTION, SOLUTION INTRAVENOUS; SUBCUTANEOUS at 20:26

## 2024-06-04 RX ADMIN — GUAIFENESIN 200 MG: 100 LIQUID ORAL at 06:45

## 2024-06-04 RX ADMIN — Medication 10 ML: at 02:39

## 2024-06-04 RX ADMIN — ACETAMINOPHEN 325MG 650 MG: 325 TABLET ORAL at 01:43

## 2024-06-04 RX ADMIN — HYDROCHLOROTHIAZIDE 12.5 MG: 12.5 TABLET ORAL at 08:17

## 2024-06-04 RX ADMIN — INSULIN LISPRO 3 UNITS: 100 INJECTION, SOLUTION INTRAVENOUS; SUBCUTANEOUS at 08:16

## 2024-06-04 RX ADMIN — SODIUM CHLORIDE 100 ML/HR: 9 INJECTION, SOLUTION INTRAVENOUS at 13:08

## 2024-06-04 RX ADMIN — ATORVASTATIN CALCIUM 80 MG: 80 TABLET, FILM COATED ORAL at 08:17

## 2024-06-04 RX ADMIN — SERTRALINE 100 MG: 100 TABLET, FILM COATED ORAL at 08:17

## 2024-06-04 RX ADMIN — SODIUM CHLORIDE 100 ML/HR: 9 INJECTION, SOLUTION INTRAVENOUS at 02:30

## 2024-06-04 RX ADMIN — Medication 10 ML: at 08:17

## 2024-06-04 RX ADMIN — DEXTROMETHORPHAN POLISTIREX 60 MG: 30 SUSPENSION ORAL at 14:48

## 2024-06-04 RX ADMIN — FLUTICASONE PROPIONATE 2 SPRAY: 50 SPRAY, METERED NASAL at 14:48

## 2024-06-04 RX ADMIN — INSULIN GLARGINE 15 UNITS: 100 INJECTION, SOLUTION SUBCUTANEOUS at 20:26

## 2024-06-04 RX ADMIN — GUAIFENESIN 1200 MG: 600 TABLET, EXTENDED RELEASE ORAL at 11:36

## 2024-06-04 RX ADMIN — POTASSIUM CHLORIDE 40 MEQ: 750 TABLET, EXTENDED RELEASE ORAL at 06:42

## 2024-06-04 RX ADMIN — CEFTRIAXONE SODIUM 1000 MG: 1 INJECTION, POWDER, FOR SOLUTION INTRAMUSCULAR; INTRAVENOUS at 17:54

## 2024-06-04 RX ADMIN — ASPIRIN 81 MG: 81 TABLET, CHEWABLE ORAL at 08:17

## 2024-06-04 RX ADMIN — INSULIN LISPRO 3 UNITS: 100 INJECTION, SOLUTION INTRAVENOUS; SUBCUTANEOUS at 17:54

## 2024-06-04 RX ADMIN — INSULIN LISPRO 4 UNITS: 100 INJECTION, SOLUTION INTRAVENOUS; SUBCUTANEOUS at 11:33

## 2024-06-04 RX ADMIN — DEXTROMETHORPHAN POLISTIREX 60 MG: 30 SUSPENSION ORAL at 20:27

## 2024-06-04 RX ADMIN — Medication 10 ML: at 20:29

## 2024-06-04 RX ADMIN — LOPERAMIDE HYDROCHLORIDE 2 MG: 2 CAPSULE ORAL at 18:46

## 2024-06-04 RX ADMIN — POTASSIUM CHLORIDE 40 MEQ: 750 TABLET, EXTENDED RELEASE ORAL at 02:30

## 2024-06-04 RX ADMIN — LISINOPRIL 2.5 MG: 2.5 TABLET ORAL at 08:16

## 2024-06-04 RX ADMIN — GUAIFENESIN 1200 MG: 600 TABLET, EXTENDED RELEASE ORAL at 20:25

## 2024-06-04 NOTE — PLAN OF CARE
Goal Outcome Evaluation:  Plan of Care Reviewed With: patient        Progress: no change  Outcome Evaluation: Pt has been AOX4, pleasant and cooperative. Pt is from home spouse was at bedside for a short time. From Urgent care today. Feeling bad for the last 5 days. Pt c/o cough and being hot low grade fever when arrived. Pt got tylenol and fan and ice packs to cool down. Pt started on IVF over night. Pt asked for robitussin however fell asleep before the med got here. Started replacing the potassium in the ER with one dose and second dose given here on 4 N. Pt is up ad ruthie. Pt has required 2L of 02 stats from low 90's to mid 90's. Could not cough up a sputum for respiratory culture. CTM, safety maintained.

## 2024-06-04 NOTE — PLAN OF CARE
Goal Outcome Evaluation:  Plan of Care Reviewed With: patient        Progress: improving  Outcome Evaluation: pt admitted on 06/03 w/ c/o chills, cough, fever, and SOA. Pt weaned down to 1L NC, when pt takes off NC her o2 drops to the 88%/ Pt c/o of some SOA throughout shift, but states she feels better. Pt has has multiple BM, stool sample collected. Pt states she is unable to cough any sputum up for sputum sample at this time. Pt up adlib. Rocephin and NS infusing. pt expresses no other needs at this time. Call light within reach.            Discharge 2-3days

## 2024-06-04 NOTE — H&P
Patient Name:  Yuliana Moeller  YOB: 1965  MRN:  [id number]  Admit Date:  6/3/2024  Patient Care Team:  Daja Fink (Geeta)JOSEPHINE as PCP - General (Family Medicine)      Subjective   History Present Illness     Chief Complaint   Patient presents with    URI   History of Present Illness  Ms. Moeller is a 58-year-old female history of hyperlipidemia, hypertension, type 2 diabetes who presents to the emergency room with cough and shortness of breath.  Patient states over the last weekend she had been doing some outside work including pressure washing, went to work on Tuesday and then after work Tuesday started to feel bad and since then has had fatigue, shortness of breath, cough, chills and sweats.  She was treating it like a virus at home with over-the-counter medications that would help temporarily with the body aches and fever.  She states that today she just started feel so bad she decided to come to the emergency room for further evaluation because she seems to be getting no better.  Abdominal pain, she did have 2 episodes of diarrhea earlier today, denies any blood in her stool.  Has had no trouble with urination.  Patient denies any underlying lung issues, she has never had pneumonia in the past.  She states she has had a dry cough but has not been productive.  She denies any chest pain.  In the emergency room her sodium was 123, potassium 3.0, creatinine 1.58, BUN 12, glucose 148, calcium 7.9, AST 80, ALT 61, lactate 1.1, white blood cell count 14.26, hemoglobin 11.8.  Blood culture pending chest x-ray shows extensive pneumonia in the left upper lobe and lingula and there is likely a very small left pleural effusion, no evidence of CHF.  Patient was started on Rocephin and azithromycin    Review of Systems   Constitutional:  Positive for chills, diaphoresis, fatigue and fever. Negative for appetite change.   HENT:  Negative for nosebleeds and trouble swallowing.    Eyes:  Negative for  photophobia, redness and visual disturbance.   Respiratory:  Positive for cough and shortness of breath. Negative for chest tightness and wheezing.    Cardiovascular:  Negative for chest pain, palpitations and leg swelling.   Gastrointestinal:  Negative for abdominal distention, abdominal pain, nausea and vomiting.   Endocrine: Negative.    Genitourinary: Negative.    Musculoskeletal:  Negative for gait problem and joint swelling.   Skin: Negative.    Neurological:  Negative for dizziness, seizures, speech difficulty, light-headedness and headaches.   Hematological: Negative.    Psychiatric/Behavioral:  Negative for behavioral problems and confusion.         Personal History     Past Medical History:   Diagnosis Date    Diabetes mellitus unknown    pre-diabetes    Diabetes mellitus type I     Hyperlipidemia     Hypertension     Hyponatremia 6/4/2024    Obesity unknown    have lost over 40 pounds     History reviewed. No pertinent surgical history.  Family History   Problem Relation Age of Onset    Diabetes Father      Social History     Tobacco Use    Smoking status: Never     Passive exposure: Never    Smokeless tobacco: Never   Vaping Use    Vaping status: Never Used   Substance Use Topics    Alcohol use: Yes     Alcohol/week: 2.0 standard drinks of alcohol     Types: 2 Glasses of wine per week     Comment: very little    Drug use: Never     No current facility-administered medications on file prior to encounter.     Current Outpatient Medications on File Prior to Encounter   Medication Sig Dispense Refill    aspirin 81 MG chewable tablet Chew 1 tablet Daily.      atorvastatin (LIPITOR) 80 MG tablet TAKE 1 TABLET EVERY NIGHT 90 tablet 1    ezetimibe (ZETIA) 10 MG tablet TAKE 1 TABLET BY MOUTH DAILY 90 tablet 1    lisinopril (PRINIVIL,ZESTRIL) 2.5 MG tablet TAKE 1 TABLET EVERY DAY 90 tablet 1    metFORMIN ER (GLUCOPHAGE-XR) 500 MG 24 hr tablet TAKE 1 TABLET EVERY DAY WITH BREAKFAST 90 tablet 1    sertraline  (ZOLOFT) 100 MG tablet TAKE 1 TABLET EVERY DAY 90 tablet 1    acetaminophen (TYLENOL) 650 MG 8 hr tablet Take 1 tablet by mouth 4 (Four) Times a Day As Needed for Mild Pain.      Continuous Blood Gluc Sensor (FreeStyle Prasanna 3 Sensor) [facility] Use 1 each Every 14 (Fourteen) Days. 6 each 1    glucagon (GLUCAGEN) 1 MG injection For emergency use for severe low glucose: first inject, then call 911, once awake try to give oral treatment. (Patient taking differently: For emergency use for severe low glucose: first inject, then call 911, once awake try to give oral treatment.**Pharmacy is out of stock currently**) 1 kit 5    hydroCHLOROthiazide (MICROZIDE) 12.5 MG capsule Take 1 capsule by mouth Every Morning. 90 capsule 3    insulin aspart (NovoLOG FlexPen) 100 UNIT/ML solution pen-injector sc pen Carb ratio 1 unit for every 10 grams of carbs plus 1 unit for every 30 over 150.  Max daily dose of 60 units 54 mL 1    Insulin Glargine (LANTUS SOLOSTAR) 100 UNIT/ML injection pen Inject 26-40 Units under the skin into the appropriate area as directed Every Night. And as directed to max of 50 units per day. (Patient taking differently: Inject 23 Units under the skin into the appropriate area as directed Every Night. And as directed to max of 50 units per day.) 15 mL 2    Insulin Pen Needle (ReliOn Pen Needles) 32G X 4 MM misc For use with pen device up to 5 times a day. Can substitute based on availability and insurance. Dx E11.65 (Patient taking differently: For use with pen device up to 5 times a day. Can substitute based on availability and insurance. Dx E11.65** hasnt been able to get from the pharmacy**) 500 each 4    Semaglutide, 1 MG/DOSE, (Ozempic, 1 MG/DOSE,) 4 MG/3ML solution pen-injector INJECT 1 MG UNDER THE SKIN INTO THE APPROPRIATE AREA AS DIRECTED 1 TIME PER WEEK. 9 mL 1     No Known Allergies    Objective    Objective     Vital Signs  Temp:  [99.5 °F (37.5 °C)-101.3 °F (38.5 °C)] 99.5 °F (37.5 °C)  Heart Rate:   [] 93  Resp:  [16-20] 20  BP: (107-146)/(52-90) 121/90  SpO2:  [86 %-95 %] 91 %  on  Flow (L/min):  [2] 2;   Device (Oxygen Therapy): nasal cannula  Body mass index is 36.94 kg/m².    Physical Exam  Vitals and nursing note reviewed.   Constitutional:       General: She is not in acute distress.     Appearance: She is well-developed.   HENT:      Head: Normocephalic.   Neck:      Vascular: No JVD.   Cardiovascular:      Rate and Rhythm: Normal rate and regular rhythm.      Comments: Open sinus rhythm on the monitor with heart rate 74 during my exam, no chest pain or peripheral edema  Pulmonary:      Effort: Pulmonary effort is normal.      Breath sounds: Normal breath sounds.      Comments: Diminished lung sounds, faint rhonchi heard left upper lobe, sats 95% on 2 L of oxygen during my exam  Abdominal:      General: There is no distension.      Palpations: Abdomen is soft.      Tenderness: There is no abdominal tenderness.   Musculoskeletal:         General: Normal range of motion.      Cervical back: Normal range of motion.   Skin:     General: Skin is warm and dry.      Capillary Refill: Capillary refill takes less than 2 seconds.   Neurological:      General: No focal deficit present.      Mental Status: She is alert and oriented to person, place, and time.   Psychiatric:         Attention and Perception: Attention normal.         Mood and Affect: Mood normal.         Behavior: Behavior normal.         Cognition and Memory: Cognition normal.         Results Review:  I reviewed the patient's new clinical results.  I reviewed the patient's new imaging results and agree with the interpretation.  I reviewed the patient's other test results and agree with the interpretation  I personally viewed and interpreted the patient's EKG/Telemetry data  Discussed with ED provider.    Lab Results (last 24 hours)       Procedure Component Value Units Date/Time    COVID-19 and FLU A/B PCR, 1 HR TAT - Swab, Nasopharynx  [[id number]]  (Normal) Collected: 06/03/24 1507    Specimen: Swab from Nasopharynx Updated: 06/03/24 1527     COVID19 Not Detected     Influenza A PCR Not Detected     Influenza B PCR Not Detected    Narrative:      Fact sheet for providers: https://www.fda.gov/media/632255/download    Fact sheet for patients: https://www.fda.gov/media/391212/download    Test performed by PCR.    CBC & Differential [[id number]]  (Abnormal) Collected: 06/03/24 1738    Specimen: Blood Updated: 06/03/24 1752    Narrative:      The following orders were created for panel order CBC & Differential.  Procedure                               Abnormality         Status                     ---------                               -----------         ------                     CBC Auto Differential[[id number]]        Abnormal            Final result                 Please view results for these tests on the individual orders.    Blood Culture - Blood, Arm, Right [[id number]] Collected: 06/03/24 1738    Specimen: Blood from Arm, Right Updated: 06/03/24 1748    Blood Culture - Blood, Arm, Left [[id number]] Collected: 06/03/24 1738    Specimen: Blood from Arm, Left Updated: 06/03/24 1748    CBC Auto Differential [[id number]]  (Abnormal) Collected: 06/03/24 1738    Specimen: Blood Updated: 06/03/24 1752     WBC 14.26 10*3/mm3      RBC 4.11 10*6/mm3      Hemoglobin 11.8 g/dL      Hematocrit 35.2 %      MCV 85.6 fL      MCH 28.7 pg      MCHC 33.5 g/dL      RDW 12.9 %      RDW-SD 41.2 fl      MPV 9.5 fL      Platelets 263 10*3/mm3      Neutrophil % 83.9 %      Lymphocyte % 5.9 %      Monocyte % 7.8 %      Eosinophil % 0.1 %      Basophil % 0.2 %      Immature Grans % 2.1 %      Neutrophils, Absolute 11.97 10*3/mm3      Lymphocytes, Absolute 0.84 10*3/mm3      Monocytes, Absolute 1.11 10*3/mm3      Eosinophils, Absolute 0.01 10*3/mm3      Basophils, Absolute 0.03 10*3/mm3      Immature Grans, Absolute 0.30 10*3/mm3     Comprehensive Metabolic Panel  [[id number]]  (Abnormal) Collected: 06/03/24 1759    Specimen: Blood Updated: 06/03/24 1833     Glucose 148 mg/dL      BUN 12 mg/dL      Creatinine 0.58 mg/dL      Sodium 123 mmol/L      Potassium 3.0 mmol/L      Comment: Slight hemolysis detected by analyzer. Result may be falsely elevated.        Chloride 93 mmol/L      CO2 20.0 mmol/L      Calcium 7.9 mg/dL      Total Protein 6.3 g/dL      Albumin 3.1 g/dL      ALT (SGPT) 61 U/L      AST (SGOT) 80 U/L      Comment: Slight hemolysis detected by analyzer. Result may be falsely elevated.        Alkaline Phosphatase 105 U/L      Total Bilirubin 0.4 mg/dL      Globulin 3.2 gm/dL      A/G Ratio 1.0 g/dL      BUN/Creatinine Ratio 20.7     Anion Gap 10.0 mmol/L      eGFR 105.7 mL/min/1.73     Narrative:      GFR Normal >60  Chronic Kidney Disease <60  Kidney Failure <15      Lactic Acid, Plasma [[id number]]  (Normal) Collected: 06/03/24 1759    Specimen: Blood Updated: 06/03/24 1823     Lactate 1.1 mmol/L     Magnesium [[id number]]  (Normal) Collected: 06/03/24 1759    Specimen: Blood Updated: 06/03/24 1917     Magnesium 1.9 mg/dL     POC Glucose Once [[id number]]  (Abnormal) Collected: 06/04/24 0057    Specimen: Blood Updated: 06/04/24 0058     Glucose 233 mg/dL     Legionella Antigen, Urine - Urine, Urine, Clean Catch [[id number]] Collected: 06/04/24 0154    Specimen: Urine, Clean Catch Updated: 06/04/24 0222    S. Pneumo Ag Urine or CSF - Urine, Urine, Clean Catch [[id number]] Collected: 06/04/24 0154    Specimen: Urine, Clean Catch Updated: 06/04/24 0222    Respiratory Panel PCR w/COVID-19(SARS-CoV-2) TRICIA/LUIS F/EMILIA/PAD/COR/AGUSTIN In-House, NP Swab in UTM/VTM, 2 HR TAT - Swab, Nasopharynx [[id number]] Collected: 06/04/24 0156    Specimen: Swab from Nasopharynx Updated: 06/04/24 0222    MRSA Screen, PCR (Inpatient) - Swab, Nares [[id number]] Collected: 06/04/24 0156    Specimen: Swab from Nares Updated: 06/04/24 0222            Imaging Results (Last 24 Hours)       Procedure  Component Value Units Date/Time    XR Chest 2 View [[id number]] Collected: 06/03/24 1657     Updated: 06/03/24 1701    Narrative:      XR CHEST 2 VW-     HISTORY: 58-year-old female with cough and fever. Hypoxia     FINDINGS: There is extensive pneumonia at the left upper lobe and  lingula. There is likely a very small left pleural effusion. There is no  CHF. Follow-up to complete resolution is recommended.     This report was finalized on 6/3/2024 4:58 PM by Dr. 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 Moeller is a 58-year-old female history of hyperlipidemia, hypertension, type 2 diabetes who presents to the emergency room with cough and shortness of breath.     Left upper lobe pneumonia  -Sputum culture pending  -Respiratory viral panel was negative  -Patient was given azithromycin and Rocephin in the emergency room, will continue Rocephin awaiting cultures  -MRSA screening is pending, Legionella and S pneumo are pending  -Incentive spirometer while awake  -O2 to keep sats above 90%  -Telemetry unit for monitoring     Hyponatremia/hypokalemia  -Normal saline at 100 cc an hour overnight  -Replace potassium per protocol  - Repeat BMP in a.m.  -Telemetry unit for monitoring    Type 2 Diabetes  -Accu-Cheks before meals and at bedtime with correctional dose insulin  -Hold oral diabetic medications  -Last hemoglobin A1c was 6.9 about 3 months ago    Hypertension/hyperlipidemia  -chronic condition stable  -continue home meds when med rec complete      I discussed the patient's findings and my recommendations with patient.    VTE  Prophylaxis - SCDs.  Code Status - Full code.       JOSEPHINE Beach  [facility]  06/04/24  03:03 EDT

## 2024-06-04 NOTE — SIGNIFICANT NOTE
06/04/24 0636   OTHER   Discipline physical therapist   Therapy Assessment/Plan (PT)   Criteria for Skilled Interventions Met (PT) no problems identified which require skilled intervention  (Per nsg notes, pt is up adlib with an ampac of 24.  No indication for acute PT needs.)

## 2024-06-04 NOTE — CASE MANAGEMENT/SOCIAL WORK
Discharge Planning Assessment  [facility]     Patient Name: Yuliana Moeller  MRN: [id number]  Today's Date: 6/4/2024    Admit Date: 6/3/2024    Plan: Home, family to transport   Discharge Needs Assessment       Row Name 06/04/24 1016       Living Environment    People in Home spouse    Name(s) of People in Home Napoleon Moeller [phone]    Current Living Arrangements home    Potentially Unsafe Housing Conditions none    In the past 12 months has the electric, gas, oil, or water company threatened to shut off services in your home? No    Primary Care Provided by self    Provides Primary Care For no one    Family Caregiver if Needed spouse    Family Caregiver Names Napoleon Moeller    Quality of Family Relationships helpful;involved    Able to Return to Prior Arrangements yes       Resource/Environmental Concerns    Resource/Environmental Concerns none    Transportation Concerns none       Transportation Needs    In the past 12 months, has lack of transportation kept you from medical appointments or from getting medications? no    In the past 12 months, has lack of transportation kept you from meetings, work, or from getting things needed for daily living? No       Food Insecurity    Within the past 12 months, you worried that your food would run out before you got the money to buy more. Never true    Within the past 12 months, the food you bought just didn't last and you didn't have money to get more. Never true       Transition Planning    Patient/Family Anticipates Transition to home;home with family    Patient/Family Anticipated Services at Transition none    Transportation Anticipated family or friend will provide       Discharge Needs Assessment    Equipment Currently Used at Home none    Concerns to be Addressed no discharge needs identified;denies needs/concerns at this time    Anticipated Changes Related to Illness none    Equipment Needed After Discharge none                   Discharge Plan       Row Name  06/04/24 1017       Plan    Plan Home, family to transport    Patient/Family in Agreement with Plan yes    Provided Post Acute Provider List? N/A    Provided Post Acute Provider Quality & Resource List? N/A    Plan Comments Met with pt at bedside. Introduced self and explained role of . Face sheet verified, PCP is Daja Fink. Pt denies any difficulty paying for medications, and she obtains her medications from [facility]. Pt lives with her spouse, Don, and stated that he could assist with any care needs that may arise. Pt is independent in ADL's, and she does not use, nor require any assistive devices. Pt continues to work full time, drive, etc. Pt has never had home health or been to SNF/rehab and she does not anticipate requiring those services at discharge. Upon discharge, pt stated that her family would transport home. Explained that CCP would follow to assess for discharge needs.                  Continued Care and Services - Admitted Since 6/3/2024    No active coordination exists for this encounter.       Expected Discharge Date and Time       Expected Discharge Date Expected Discharge Time    Jun 7, 2024            Demographic Summary    No documentation.                  Functional Status    No documentation.                  Psychosocial    No documentation.                  Abuse/Neglect    No documentation.                  Legal    No documentation.                  Substance Abuse    No documentation.                  Patient Forms    No documentation.                     Sharona Canchola, KADEN

## 2024-06-05 ENCOUNTER — APPOINTMENT (OUTPATIENT)
Dept: GENERAL RADIOLOGY | Facility: HOSPITAL | Age: 59
End: 2024-06-05
Payer: COMMERCIAL

## 2024-06-05 LAB
ALBUMIN SERPL-MCNC: 3 G/DL (ref 3.5–5.2)
ALBUMIN SERPL-MCNC: 3.1 G/DL (ref 3.5–5.2)
ALBUMIN/GLOB SERPL: 0.8 G/DL
ALP SERPL-CCNC: 123 U/L (ref 39–117)
ALP SERPL-CCNC: 200 U/L (ref 39–117)
ALT SERPL W P-5'-P-CCNC: 118 U/L (ref 1–33)
ALT SERPL W P-5'-P-CCNC: 82 U/L (ref 1–33)
ANION GAP SERPL CALCULATED.3IONS-SCNC: 10 MMOL/L (ref 5–15)
ANION GAP SERPL CALCULATED.3IONS-SCNC: 11 MMOL/L (ref 5–15)
ANION GAP SERPL CALCULATED.3IONS-SCNC: 11 MMOL/L (ref 5–15)
ANION GAP SERPL CALCULATED.3IONS-SCNC: 12 MMOL/L (ref 5–15)
ANION GAP SERPL CALCULATED.3IONS-SCNC: 12 MMOL/L (ref 5–15)
ANION GAP SERPL CALCULATED.3IONS-SCNC: 9 MMOL/L (ref 5–15)
ARTERIAL PATENCY WRIST A: POSITIVE
AST SERPL-CCNC: 141 U/L (ref 1–32)
AST SERPL-CCNC: 99 U/L (ref 1–32)
ATMOSPHERIC PRESS: 741.7 MMHG
BASE EXCESS BLDA CALC-SCNC: -2.6 MMOL/L (ref 0–2)
BDY SITE: ABNORMAL
BILIRUB CONJ SERPL-MCNC: <0.2 MG/DL (ref 0–0.3)
BILIRUB INDIRECT SERPL-MCNC: ABNORMAL MG/DL
BILIRUB SERPL-MCNC: 0.2 MG/DL (ref 0–1.2)
BILIRUB SERPL-MCNC: 0.3 MG/DL (ref 0–1.2)
BUN SERPL-MCNC: 7 MG/DL (ref 6–20)
BUN SERPL-MCNC: 8 MG/DL (ref 6–20)
BUN/CREAT SERPL: 10.9 (ref 7–25)
BUN/CREAT SERPL: 13.8 (ref 7–25)
BUN/CREAT SERPL: 14.8 (ref 7–25)
BUN/CREAT SERPL: 16.7 (ref 7–25)
CALCIUM SPEC-SCNC: 7.9 MG/DL (ref 8.6–10.5)
CALCIUM SPEC-SCNC: 8.1 MG/DL (ref 8.6–10.5)
CALCIUM SPEC-SCNC: 8.2 MG/DL (ref 8.6–10.5)
CALCIUM SPEC-SCNC: 8.4 MG/DL (ref 8.6–10.5)
CHLORIDE SERPL-SCNC: 91 MMOL/L (ref 98–107)
CHLORIDE SERPL-SCNC: 93 MMOL/L (ref 98–107)
CHLORIDE SERPL-SCNC: 94 MMOL/L (ref 98–107)
CHLORIDE SERPL-SCNC: 95 MMOL/L (ref 98–107)
CO2 BLDA-SCNC: 22 MMOL/L (ref 23–27)
CO2 SERPL-SCNC: 21 MMOL/L (ref 22–29)
CO2 SERPL-SCNC: 24 MMOL/L (ref 22–29)
CO2 SERPL-SCNC: 24 MMOL/L (ref 22–29)
CO2 SERPL-SCNC: 25 MMOL/L (ref 22–29)
CO2 SERPL-SCNC: 25 MMOL/L (ref 22–29)
CO2 SERPL-SCNC: 28 MMOL/L (ref 22–29)
CREAT SERPL-MCNC: 0.48 MG/DL (ref 0.57–1)
CREAT SERPL-MCNC: 0.54 MG/DL (ref 0.57–1)
CREAT SERPL-MCNC: 0.58 MG/DL (ref 0.57–1)
CREAT SERPL-MCNC: 0.64 MG/DL (ref 0.57–1)
D-LACTATE SERPL-SCNC: 1.4 MMOL/L (ref 0.5–2)
DEPRECATED RDW RBC AUTO: 40.7 FL (ref 37–54)
DEVICE COMMENT: ABNORMAL
EGFRCR SERPLBLD CKD-EPI 2021: 103.2 ML/MIN/1.73
EGFRCR SERPLBLD CKD-EPI 2021: 105.7 ML/MIN/1.73
EGFRCR SERPLBLD CKD-EPI 2021: 107.5 ML/MIN/1.73
EGFRCR SERPLBLD CKD-EPI 2021: 110.6 ML/MIN/1.73
ERYTHROCYTE [DISTWIDTH] IN BLOOD BY AUTOMATED COUNT: 13.2 % (ref 12.3–15.4)
GAS FLOW AIRWAY: 7 LPM
GLOBULIN UR ELPH-MCNC: 3.7 GM/DL
GLUCOSE BLDC GLUCOMTR-MCNC: 237 MG/DL (ref 70–130)
GLUCOSE BLDC GLUCOMTR-MCNC: 242 MG/DL (ref 70–130)
GLUCOSE BLDC GLUCOMTR-MCNC: 269 MG/DL (ref 70–130)
GLUCOSE SERPL-MCNC: 146 MG/DL (ref 65–99)
GLUCOSE SERPL-MCNC: 146 MG/DL (ref 65–99)
GLUCOSE SERPL-MCNC: 202 MG/DL (ref 65–99)
GLUCOSE SERPL-MCNC: 211 MG/DL (ref 65–99)
GLUCOSE SERPL-MCNC: 259 MG/DL (ref 65–99)
GLUCOSE SERPL-MCNC: 299 MG/DL (ref 65–99)
HCO3 BLDA-SCNC: 21 MMOL/L (ref 22–28)
HCT VFR BLD AUTO: 29.5 % (ref 34–46.6)
HCT VFR BLDA CALC: 31 % (ref 38–51)
HEMODILUTION: NO
HGB BLD-MCNC: 10 G/DL (ref 12–15.9)
HGB BLDA-MCNC: 10.7 G/DL (ref 12–17)
MCH RBC QN AUTO: 29.1 PG (ref 26.6–33)
MCHC RBC AUTO-ENTMCNC: 33.9 G/DL (ref 31.5–35.7)
MCV RBC AUTO: 85.8 FL (ref 79–97)
MODALITY: ABNORMAL
NT-PROBNP SERPL-MCNC: 2095 PG/ML (ref 0–900)
PCO2 BLDA: 31.6 MM HG (ref 35–45)
PH BLDA: 7.43 PH UNITS (ref 7.35–7.45)
PLATELET # BLD AUTO: 324 10*3/MM3 (ref 140–450)
PMV BLD AUTO: 8.7 FL (ref 6–12)
PO2 BLDA: 65.9 MM HG (ref 80–100)
POTASSIUM SERPL-SCNC: 3.4 MMOL/L (ref 3.5–5.2)
POTASSIUM SERPL-SCNC: 3.7 MMOL/L (ref 3.5–5.2)
POTASSIUM SERPL-SCNC: 3.7 MMOL/L (ref 3.5–5.2)
POTASSIUM SERPL-SCNC: 3.9 MMOL/L (ref 3.5–5.2)
POTASSIUM SERPL-SCNC: 3.9 MMOL/L (ref 3.5–5.2)
POTASSIUM SERPL-SCNC: 4.1 MMOL/L (ref 3.5–5.2)
PROCALCITONIN SERPL-MCNC: 0.23 NG/ML (ref 0–0.25)
PROT SERPL-MCNC: 6.3 G/DL (ref 6–8.5)
PROT SERPL-MCNC: 6.8 G/DL (ref 6–8.5)
RBC # BLD AUTO: 3.44 10*6/MM3 (ref 3.77–5.28)
SAO2 % BLDCOA: 93.5 % (ref 92–98.5)
SODIUM SERPL-SCNC: 127 MMOL/L (ref 136–145)
SODIUM SERPL-SCNC: 127 MMOL/L (ref 136–145)
SODIUM SERPL-SCNC: 128 MMOL/L (ref 136–145)
SODIUM SERPL-SCNC: 129 MMOL/L (ref 136–145)
SODIUM SERPL-SCNC: 130 MMOL/L (ref 136–145)
SODIUM SERPL-SCNC: 130 MMOL/L (ref 136–145)
TOTAL RATE: 24 BREATHS/MINUTE
WBC NRBC COR # BLD AUTO: 16.25 10*3/MM3 (ref 3.4–10.8)

## 2024-06-05 PROCEDURE — 83605 ASSAY OF LACTIC ACID: CPT

## 2024-06-05 PROCEDURE — 63710000001 INSULIN LISPRO (HUMAN) PER 5 UNITS: Performed by: HOSPITALIST

## 2024-06-05 PROCEDURE — 85027 COMPLETE CBC AUTOMATED: CPT | Performed by: INTERNAL MEDICINE

## 2024-06-05 PROCEDURE — 94761 N-INVAS EAR/PLS OXIMETRY MLT: CPT

## 2024-06-05 PROCEDURE — 94799 UNLISTED PULMONARY SVC/PX: CPT

## 2024-06-05 PROCEDURE — 80053 COMPREHEN METABOLIC PANEL: CPT | Performed by: INTERNAL MEDICINE

## 2024-06-05 PROCEDURE — 94664 DEMO&/EVAL PT USE INHALER: CPT

## 2024-06-05 PROCEDURE — 25010000002 PIPERACILLIN SOD-TAZOBACTAM PER 1 G: Performed by: HOSPITALIST

## 2024-06-05 PROCEDURE — 25010000002 FUROSEMIDE PER 20 MG: Performed by: INTERNAL MEDICINE

## 2024-06-05 PROCEDURE — 82948 REAGENT STRIP/BLOOD GLUCOSE: CPT

## 2024-06-05 PROCEDURE — 25010000002 FUROSEMIDE PER 20 MG

## 2024-06-05 PROCEDURE — 82248 BILIRUBIN DIRECT: CPT | Performed by: INTERNAL MEDICINE

## 2024-06-05 PROCEDURE — 85018 HEMOGLOBIN: CPT

## 2024-06-05 PROCEDURE — 36600 WITHDRAWAL OF ARTERIAL BLOOD: CPT | Performed by: INTERNAL MEDICINE

## 2024-06-05 PROCEDURE — 83880 ASSAY OF NATRIURETIC PEPTIDE: CPT | Performed by: INTERNAL MEDICINE

## 2024-06-05 PROCEDURE — 63710000001 INSULIN LISPRO (HUMAN) PER 5 UNITS: Performed by: NURSE PRACTITIONER

## 2024-06-05 PROCEDURE — 71045 X-RAY EXAM CHEST 1 VIEW: CPT

## 2024-06-05 PROCEDURE — 84145 PROCALCITONIN (PCT): CPT | Performed by: HOSPITALIST

## 2024-06-05 PROCEDURE — 82803 BLOOD GASES ANY COMBINATION: CPT | Performed by: INTERNAL MEDICINE

## 2024-06-05 PROCEDURE — 94640 AIRWAY INHALATION TREATMENT: CPT

## 2024-06-05 PROCEDURE — 63710000001 INSULIN GLARGINE PER 5 UNITS: Performed by: INTERNAL MEDICINE

## 2024-06-05 RX ORDER — IPRATROPIUM BROMIDE AND ALBUTEROL SULFATE 2.5; .5 MG/3ML; MG/3ML
3 SOLUTION RESPIRATORY (INHALATION)
Status: DISCONTINUED | OUTPATIENT
Start: 2024-06-05 | End: 2024-06-11 | Stop reason: HOSPADM

## 2024-06-05 RX ORDER — FUROSEMIDE 10 MG/ML
40 INJECTION INTRAMUSCULAR; INTRAVENOUS ONCE
Status: COMPLETED | OUTPATIENT
Start: 2024-06-05 | End: 2024-06-05

## 2024-06-05 RX ORDER — FUROSEMIDE 10 MG/ML
60 INJECTION INTRAMUSCULAR; INTRAVENOUS EVERY 12 HOURS
Status: DISCONTINUED | OUTPATIENT
Start: 2024-06-05 | End: 2024-06-10

## 2024-06-05 RX ORDER — IPRATROPIUM BROMIDE AND ALBUTEROL SULFATE 2.5; .5 MG/3ML; MG/3ML
3 SOLUTION RESPIRATORY (INHALATION) EVERY 4 HOURS PRN
Status: DISCONTINUED | OUTPATIENT
Start: 2024-06-05 | End: 2024-06-11 | Stop reason: HOSPADM

## 2024-06-05 RX ORDER — INSULIN LISPRO 100 [IU]/ML
3-14 INJECTION, SOLUTION INTRAVENOUS; SUBCUTANEOUS
Status: DISCONTINUED | OUTPATIENT
Start: 2024-06-05 | End: 2024-06-06

## 2024-06-05 RX ORDER — POTASSIUM CHLORIDE 750 MG/1
40 TABLET, FILM COATED, EXTENDED RELEASE ORAL EVERY 4 HOURS
Status: COMPLETED | OUTPATIENT
Start: 2024-06-05 | End: 2024-06-05

## 2024-06-05 RX ADMIN — POTASSIUM CHLORIDE 40 MEQ: 750 TABLET, EXTENDED RELEASE ORAL at 12:04

## 2024-06-05 RX ADMIN — FUROSEMIDE 40 MG: 10 INJECTION, SOLUTION INTRAMUSCULAR; INTRAVENOUS at 07:19

## 2024-06-05 RX ADMIN — IPRATROPIUM BROMIDE AND ALBUTEROL SULFATE 3 ML: .5; 3 SOLUTION RESPIRATORY (INHALATION) at 11:14

## 2024-06-05 RX ADMIN — Medication 10 ML: at 20:03

## 2024-06-05 RX ADMIN — Medication 10 ML: at 08:10

## 2024-06-05 RX ADMIN — INSULIN LISPRO 3 UNITS: 100 INJECTION, SOLUTION INTRAVENOUS; SUBCUTANEOUS at 08:09

## 2024-06-05 RX ADMIN — FLUTICASONE PROPIONATE 2 SPRAY: 50 SPRAY, METERED NASAL at 08:11

## 2024-06-05 RX ADMIN — IPRATROPIUM BROMIDE AND ALBUTEROL SULFATE 3 ML: .5; 3 SOLUTION RESPIRATORY (INHALATION) at 20:46

## 2024-06-05 RX ADMIN — INSULIN GLARGINE 20 UNITS: 100 INJECTION, SOLUTION SUBCUTANEOUS at 20:01

## 2024-06-05 RX ADMIN — ACETAMINOPHEN 325MG 650 MG: 325 TABLET ORAL at 14:51

## 2024-06-05 RX ADMIN — ACETAMINOPHEN 325MG 650 MG: 325 TABLET ORAL at 07:29

## 2024-06-05 RX ADMIN — INSULIN LISPRO 8 UNITS: 100 INJECTION, SOLUTION INTRAVENOUS; SUBCUTANEOUS at 17:35

## 2024-06-05 RX ADMIN — IPRATROPIUM BROMIDE AND ALBUTEROL SULFATE 3 ML: .5; 3 SOLUTION RESPIRATORY (INHALATION) at 06:50

## 2024-06-05 RX ADMIN — DEXTROMETHORPHAN POLISTIREX 60 MG: 30 SUSPENSION ORAL at 20:03

## 2024-06-05 RX ADMIN — SERTRALINE 100 MG: 100 TABLET, FILM COATED ORAL at 08:09

## 2024-06-05 RX ADMIN — INSULIN LISPRO 8 UNITS: 100 INJECTION, SOLUTION INTRAVENOUS; SUBCUTANEOUS at 20:01

## 2024-06-05 RX ADMIN — GUAIFENESIN 1200 MG: 600 TABLET, EXTENDED RELEASE ORAL at 20:01

## 2024-06-05 RX ADMIN — INSULIN LISPRO 3 UNITS: 100 INJECTION, SOLUTION INTRAVENOUS; SUBCUTANEOUS at 11:47

## 2024-06-05 RX ADMIN — GUAIFENESIN 1200 MG: 600 TABLET, EXTENDED RELEASE ORAL at 08:09

## 2024-06-05 RX ADMIN — ASPIRIN 81 MG: 81 TABLET, CHEWABLE ORAL at 08:09

## 2024-06-05 RX ADMIN — PIPERACILLIN AND TAZOBACTAM 3.38 G: 3; .375 INJECTION, POWDER, FOR SOLUTION INTRAVENOUS at 19:45

## 2024-06-05 RX ADMIN — DEXTROMETHORPHAN POLISTIREX 60 MG: 30 SUSPENSION ORAL at 08:09

## 2024-06-05 RX ADMIN — FUROSEMIDE 60 MG: 10 INJECTION, SOLUTION INTRAMUSCULAR; INTRAVENOUS at 14:51

## 2024-06-05 RX ADMIN — POTASSIUM CHLORIDE 40 MEQ: 750 TABLET, EXTENDED RELEASE ORAL at 16:09

## 2024-06-05 NOTE — PROGRESS NOTES
" LOS: 0 days     Name: Yuliana Moeller  Age: 58 y.o.  Sex: female  :  1965  MRN: [id number]         Primary Care Physician: Daja Fink (Geeta), JOSEPHINE    Subjective   Subjective  Patient developed worsening shortness of breath overnight last night with associated worsening hypoxia and now requiring 9 L high flow.  Pulmonology was consulted and chest x-ray was obtained which showed pulmonary edema.  IV fluids were stopped in favor of a one-time dose of IV Lasix given this morning.  She still feels short of breath.    Objective   Vital Signs  Temp:  [99.3 °F (37.4 °C)-100 °F (37.8 °C)] 100 °F (37.8 °C)  Heart Rate:  [105-109] 105  Resp:  [20-44] 44  BP: (138-159)/(68-80) 151/73  Body mass index is 36.94 kg/m².    Objective:  General Appearance:  Comfortable and in no acute distress.    Vital signs: (most recent): Blood pressure 151/73, pulse 105, temperature 100 °F (37.8 °C), temperature source Oral, resp. rate (!) 44, height 149.9 cm (59\"), weight 83 kg (182 lb 14.4 oz), SpO2 93%.    Lungs:  Tachypnea and increased effort.  There are rales and decreased breath sounds.    Heart: Normal rate.  Regular rhythm.    Abdomen: Abdomen is soft.  Bowel sounds are normal.   There is no abdominal tenderness.     Extremities: There is no dependent edema or local swelling.    Neurological: Patient is alert and oriented to person, place and time.    Skin:  Warm and dry.                Results Review:       I reviewed the patient's new clinical results.    Results from last 7 days   Lab Units 24  0610 24  0424 24  0425 24  1738   WBC 10*3/mm3  --  16.25* 12.87* 14.26*   HEMOGLOBIN g/dL  --  10.0* 10.2* 11.8*   HEMOGLOBIN, POC g/dL 10.7*  --   --   --    PLATELETS 10*3/mm3  --  324 277 263     Results from last 7 days   Lab Units 24  1035 24  0424 24  2230 24  1633 24  1202 24  0425 24  1759   SODIUM mmol/L 128* 127* 127* 128* 127* 125* 123*   POTASSIUM mmol/L " 3.4* 3.9 3.9 4.2 3.9 3.4* 3.0*   CHLORIDE mmol/L 93* 95* 94* 93* 92* 92* 93*   CO2 mmol/L 24.0 21.0* 24.0 21.9* 24.2 24.0 20.0*   BUN mg/dL 8 8 8 9 10 12 12   CREATININE mg/dL 0.58 0.48* 0.54* 0.53* 0.59 0.61 0.58   CALCIUM mg/dL 8.2* 7.9* 8.1* 7.9* 8.6 8.0* 7.9*   GLUCOSE mg/dL 259* 202* 211* 229* 232* 201* 148*           Scheduled Meds:   aspirin, 81 mg, Oral, Daily  cefTRIAXone, 1,000 mg, Intravenous, Q24H  dextromethorphan polistirex ER, 60 mg, Oral, Q12H  fluticasone, 2 spray, Each Nare, Daily  furosemide, 60 mg, Intravenous, Q12H  guaiFENesin, 1,200 mg, Oral, Q12H  insulin glargine, 20 Units, Subcutaneous, Nightly  insulin lispro, 2-7 Units, Subcutaneous, 4x Daily AC & at Bedtime  ipratropium-albuterol, 3 mL, Nebulization, Q6H While Awake - RT  potassium chloride ER, 40 mEq, Oral, Q4H  sertraline, 100 mg, Oral, Daily  sodium chloride, 10 mL, Intravenous, Q12H      PRN Meds:     acetaminophen **OR** acetaminophen **OR** acetaminophen    senna-docusate sodium **AND** polyethylene glycol **AND** bisacodyl **AND** bisacodyl    dextrose    dextrose    glucagon (human recombinant)    guaifenesin    ipratropium-albuterol    loperamide    nitroglycerin    ondansetron ODT    Potassium Replacement - Follow Nurse / BPA Driven Protocol    sodium chloride    sodium chloride  Continuous Infusions:       Assessment & Plan   Active Hospital Problems    Diagnosis  POA    **Left upper lobe pneumonia [J18.9]  Yes    Hyponatremia [E87.1]  Yes    Hypokalemia [E87.6]  Yes    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       Assessment & Plan    -Oxygenation worsened overnight last night and now requiring 9 L high flow.  Pulmonology evaluated early this morning.  One-time dose of IV Lasix 40 mg was given and IV fluids discontinued  -She still sounds wet and  crackly on exam at the time my visit.  Ongoing to initiate additional IV diuretics in the form of Lasix 60 mg IV every 12 hours  -Echocardiogram has been ordered.  Check BNP  -Continue Rocephin  -Continue to monitor sodium every 6 hours for now.  Overall this is improved and remained stable at 128 over the last couple of checks  -Continue to monitor LFTs which remain elevated  -Blood sugars remain elevated.  Increase Lantus to 20 units for this evening and continue sliding scale  -Low threshold to transfer to the ICU should she so any further decline of respiratory status.      Expected Discharge Date: 6/7/2024; Expected Discharge Time:      Virgil Khalil MD  [facility]  06/05/24  13:13 EDT

## 2024-06-05 NOTE — PROGRESS NOTES
[facility] Clinical Pharmacy Services: Piperacillin-Tazobactam Consult    Pt Name: Yuliana Moeller   : 1965    Indication: Pneumonia    Relevant clinical data and objective history reviewed:    Past Medical History:   Diagnosis Date    Diabetes mellitus unknown    pre-diabetes    Diabetes mellitus type I     Hyperlipidemia     Hypertension     Hyponatremia 2024    Obesity unknown    have lost over 40 pounds     Creatinine   Date Value Ref Range Status   2024 0.58 0.57 - 1.00 mg/dL Final   2024 0.48 (L) 0.57 - 1.00 mg/dL Final   2024 0.54 (L) 0.57 - 1.00 mg/dL Final   2021 0.7 0.7 - 1.5 mg/dL Final   2020 0.7 0.7 - 1.5 mg/dL Final   2020 0.6 (L) 0.7 - 1.5 mg/dL Final     BUN   Date Value Ref Range Status   2024 8 6 - 20 mg/dL Final   2021 14 7 - 20 mg/dL Final     Estimated Creatinine Clearance: 105.6 mL/min (by C-G formula based on SCr of 0.58 mg/dL).    Lab Results   Component Value Date    WBC 16.25 (H) 2024     Temp Readings from Last 3 Encounters:   24 (!) 101.3 °F (38.5 °C) (Tympanic)   24 96.9 °F (36.1 °C) (Temporal)   23 97.8 °F (36.6 °C) (Oral)      Assessment/Plan  Estimated CrCl >20 mL/min at this time; BMI 36.94 kg/m2  Will start piperacillin-tazobactam 3.375 g IV every 8 hours for 5 days    Pharmacy will continue to follow daily while on piperacillin-tazobactam and adjust as needed. Thank you for this consult.    Tamika Wolf, [facility]  Clinical Pharmacist

## 2024-06-05 NOTE — PROGRESS NOTES
Notified by nursing staff.  Patient responded with good urine output with diuresis.  Oxygen requirements have not significantly improved.  Suspect this is more pneumonia than edema.  Broadening antibiotics and transferred to intensive care.    Electronically signed by Mike Kam MD, 06/05/24, 3:24 PM EDT.

## 2024-06-05 NOTE — PLAN OF CARE
Goal Outcome Evaluation:  Plan of Care Reviewed With: patient        Progress: no change  Outcome Evaluation: Pt has been AOX4. Pt is up ad ruthie. Pt awake more this shift. Pt was on 2L until went into a deep sleep around 0300 and had to be turned up to 4L to keep sats greater than 90%. All needs met CTM, safety maintained.

## 2024-06-05 NOTE — CONSULTS
Group: [facility]         CONSULT NOTE    Patient Identification:  Yuliana Moeller  58 y.o.  female  1965  [id number]            Requesting physician: JOSEPHINE Mcintyre    Reason for Consultation:  hypoxia    CC: Shortness of breath, cough    History of Present Illness:  Yuliana Moeller is a 58-year-old female with past medical history of diabetes mellitus, hypertension, hyperlipidemia.  She denies a history of smoking, lung issues, has never had pneumonia or bronchitis previously.    She presented to the emergency department on 6/3/2024 after approximately 1 week of cough, shortness of breath, subjective fevers, and fatigue.  Patient reports that she felt like she had a viral infection and was attempting to manage at home with the supportive measures.  She developed worsening shortness of breath and came into the ED for further evaluation.   ED evaluation revealed hyponatremia with a sodium 123, potassium 3.0, lactate 1.1, WBC 14.26.  Respiratory viral panel was negative.  Legionella, MRSA, strep pneumo all negative.  Initial chest x-ray on 6/3/2024 showed extensive pneumonia in the left upper lobe.    Patient was admitted for left upper lobe pneumonia and started on ceftriaxone.  Earlier this evening, patient required an increase in her supplemental oxygen from 2 L to 4 L with concurrent increased work of breathing.  Pulmonary was consulted to evaluate patient for worsening hypoxia.    Review of Systems:  CONSTITUTIONAL: Positive for fatigue, fever and chills  EYE:  No new vision changes  EAR:  No change in hearing  CARDIAC:  No chest pain  PULMONARY: Positive productive cough, shortness of breath  GI:  No diarrhea, hematemesis or hematochezia  RENAL:  No dysuria or urinary frequency  MUSCULOSKELETAL:  No musculoskeletal complaints  ENDOCRINE:  No heat or cold intolerance  INTEGUMENTARY: No skin rashes  NEUROLOGICAL:  No dizziness or confusion.  No seizure activity  PSYCHIATRIC:  No new anxiety  or depression  12 system review of systems performed and all else negative     Past Medical History:  Past Medical History:   Diagnosis Date    Diabetes mellitus unknown    pre-diabetes    Diabetes mellitus type I     Hyperlipidemia     Hypertension     Hyponatremia 6/4/2024    Obesity unknown    have lost over 40 pounds       Past Surgical History:  History reviewed. No pertinent surgical history.     Home Meds:  Medications Prior to Admission   Medication Sig Dispense Refill Last Dose    aspirin 81 MG chewable tablet Chew 1 tablet Daily.   6/3/2024    atorvastatin (LIPITOR) 80 MG tablet TAKE 1 TABLET EVERY NIGHT 90 tablet 1 6/3/2024    ezetimibe (ZETIA) 10 MG tablet TAKE 1 TABLET BY MOUTH DAILY 90 tablet 1 6/3/2024    lisinopril (PRINIVIL,ZESTRIL) 2.5 MG tablet TAKE 1 TABLET EVERY DAY 90 tablet 1 Past Month    metFORMIN ER (GLUCOPHAGE-XR) 500 MG 24 hr tablet TAKE 1 TABLET EVERY DAY WITH BREAKFAST 90 tablet 1 6/4/2024    sertraline (ZOLOFT) 100 MG tablet TAKE 1 TABLET EVERY DAY 90 tablet 1 6/3/2024    acetaminophen (TYLENOL) 650 MG 8 hr tablet Take 1 tablet by mouth 4 (Four) Times a Day As Needed for Mild Pain.       Continuous Blood Gluc Sensor (FreeStyle Prasanna 3 Sensor) [facility] Use 1 each Every 14 (Fourteen) Days. 6 each 1     glucagon (GLUCAGEN) 1 MG injection For emergency use for severe low glucose: first inject, then call 911, once awake try to give oral treatment. (Patient taking differently: For emergency use for severe low glucose: first inject, then call 911, once awake try to give oral treatment.**Pharmacy is out of stock currently**) 1 kit 5     hydroCHLOROthiazide (MICROZIDE) 12.5 MG capsule Take 1 capsule by mouth Every Morning. 90 capsule 3     insulin aspart (NovoLOG FlexPen) 100 UNIT/ML solution pen-injector sc pen Carb ratio 1 unit for every 10 grams of carbs plus 1 unit for every 30 over 150.  Max daily dose of 60 units 54 mL 1     Insulin Glargine (LANTUS SOLOSTAR) 100 UNIT/ML injection pen Inject  "26-40 Units under the skin into the appropriate area as directed Every Night. And as directed to max of 50 units per day. (Patient taking differently: Inject 23 Units under the skin into the appropriate area as directed Every Night. And as directed to max of 50 units per day.) 15 mL 2     Insulin Pen Needle (ReliOn Pen Needles) 32G X 4 MM misc For use with pen device up to 5 times a day. Can substitute based on availability and insurance. Dx E11.65 (Patient taking differently: For use with pen device up to 5 times a day. Can substitute based on availability and insurance. Dx E11.65** hasnt been able to get from the pharmacy**) 500 each 4     Semaglutide, 1 MG/DOSE, (Ozempic, 1 MG/DOSE,) 4 MG/3ML solution pen-injector INJECT 1 MG UNDER THE SKIN INTO THE APPROPRIATE AREA AS DIRECTED 1 TIME PER WEEK. 9 mL 1        Allergies:  No Known Allergies    Social History:   Social History     Socioeconomic History    Marital status:    Tobacco Use    Smoking status: Never     Passive exposure: Never    Smokeless tobacco: Never   Vaping Use    Vaping status: Never Used   Substance and Sexual Activity    Alcohol use: Yes     Alcohol/week: 2.0 standard drinks of alcohol     Types: 2 Glasses of wine per week     Comment: very little    Drug use: Never    Sexual activity: Yes     Partners: Male     Birth control/protection: Post-menopausal       Family History:  Family History   Problem Relation Age of Onset    Diabetes Father        Physical Exam:  /68 (BP Location: Left arm, Patient Position: Lying)   Pulse 106   Temp 99.5 °F (37.5 °C) (Oral)   Resp 20   Ht 149.9 cm (59\")   Wt 83 kg (182 lb 14.4 oz)   SpO2 (!) 89%   BMI 36.94 kg/m²  Body mass index is 36.94 kg/m². (!) 89% 83 kg (182 lb 14.4 oz)  Constitutional: Middle aged pt in bed, tachypneic, breathing shallow, appears to have labored breathing  Head: - NCAT  Eyes: No pallor, Anicteric conjunctiva, EOMI.  ENMT:  Mallampati 3, no oral thrush. Dry MM.  No " JVD  NECK: Trachea midline, No thyromegaly, no palpable cervical lymphadenopathy  Heart: Tachycardic rate, regular rhythm, no murmur. No pedal edema   Lungs: JOSH +, rhonchi throughout, no wheezing, on 7 L high flow nasal cannula  Abdomen: Soft, obese. No tenderness, guarding or rigidity. No palpable masses  Extremities: Extremities warm and well perfused. No cyanosis/ clubbing  Neuro: Conscious, answers appropriately, no gross focal neuro deficits  Psych: Mood and affect appropriate    PPE recommended per [facility] infectious disease Isolation protocol for the current clinical scenario(as mentioned below) was followed.      LABS:  COVID19   Date Value Ref Range Status   06/04/2024 Not Detected Not Detected - Ref. Range Final       Lab Results   Component Value Date    CALCIUM 7.9 (L) 06/05/2024     Results from last 7 days   Lab Units 06/05/24  0610 06/05/24  0424 06/04/24  2230 06/04/24  1633 06/04/24  1202 06/04/24  0425 06/03/24  1759 06/03/24  1759 06/03/24  1738   MAGNESIUM mg/dL  --   --   --   --   --  2.1  --  1.9  --    SODIUM mmol/L  --  127* 127* 128*   < > 125*   < > 123*  --    POTASSIUM mmol/L  --  3.9 3.9 4.2   < > 3.4*   < > 3.0*  --    CHLORIDE mmol/L  --  95* 94* 93*   < > 92*   < > 93*  --    CO2 mmol/L  --  21.0* 24.0 21.9*   < > 24.0   < > 20.0*  --    BUN mg/dL  --  8 8 9   < > 12   < > 12  --    CREATININE mg/dL  --  0.48* 0.54* 0.53*   < > 0.61   < > 0.58  --    GLUCOSE mg/dL  --  202* 211* 229*   < > 201*   < > 148*  --    CALCIUM mg/dL  --  7.9* 8.1* 7.9*   < > 8.0*   < > 7.9*  --    WBC 10*3/mm3  --  16.25*  --   --   --  12.87*  --   --  14.26*   HEMOGLOBIN g/dL  --  10.0*  --   --   --  10.2*  --   --  11.8*   HEMOGLOBIN, POC g/dL 10.7*  --   --   --   --   --   --   --   --    PLATELETS 10*3/mm3  --  324  --   --   --  277  --   --  263   ALT (SGPT) U/L  --  82*  --   --   --  62*  --  61*  --    AST (SGOT) U/L  --  99*  --   --   --  74*  --  80*  --    PROCALCITONIN ng/mL  --    "--   --   --   --  0.24  --   --   --     < > = values in this interval not displayed.     No results found for: \"CKTOTAL\", \"CKMB\", \"CKMBINDEX\", \"TROPONINI\", \"TROPONINT\"      Results from last 7 days   Lab Units 06/03/24  1738   BLOODCX  No growth at 24 hours  No growth at 24 hours     Results from last 7 days   Lab Units 06/04/24  0425 06/03/24  1759   PROCALCITONIN ng/mL 0.24  --    LACTATE mmol/L  --  1.1     Results from last 7 days   Lab Units 06/05/24  0610   PH, ARTERIAL pH units 7.431   PCO2, ARTERIAL mm Hg 31.6*   PO2 ART mm Hg 65.9*   O2 SATURATION ART % 93.5   FLOW RATE lpm 7.0000   MODALITY  HFNC     Results from last 7 days   Lab Units 06/04/24  1629 06/04/24  0156   ADENOVIRUS DETECTION BY PCR   --  Not Detected   ADENOVIRUS  Not Detected  --    CORONAVIRUS 229E   --  Not Detected   CORONAVIRUS HKU1   --  Not Detected   CORONAVIRUS NL63   --  Not Detected   CORONAVIRUS OC43   --  Not Detected   HUMAN METAPNEUMOVIRUS   --  Not Detected   HUMAN RHINOVIRUS/ENTEROVIRUS   --  Not Detected   INFLUENZA B PCR   --  Not Detected   PARAINFLUENZA 1   --  Not Detected   PARAINFLUENZA VIRUS 2   --  Not Detected   PARAINFLUENZA VIRUS 3   --  Not Detected   PARAINFLUENZA VIRUS 4   --  Not Detected   BORDETELLA PERTUSSIS PCR   --  Not Detected   BORDETELLA PARAPERTUSSIS PCR   --  Not Detected   CHLAMYDOPHILA PNEUMONIAE PCR   --  Not Detected   MYCOPLAMA PNEUMO PCR   --  Not Detected   RSV, PCR   --  Not Detected         Results from last 7 days   Lab Units 06/03/24  1738   BLOODCX  No growth at 24 hours  No growth at 24 hours     Lab Results   Component Value Date    TSH 3.710 06/04/2024     Estimated Creatinine Clearance: 127.6 mL/min (A) (by C-G formula based on SCr of 0.48 mg/dL (L)).         Imaging: I personally visualized the images of scans/x-rays performed within last 3 days.      Assessment:  Acute hypoxic respiratory failure  Left upper lobe pneumonia  Hyponatremia/hypokalemia  Type 2 diabetes " mellitus  Hypertension  Hyperlipidemia    Recommendations:  Recently required an increase in supplemental oxygen from 2 L to 7 L high flow nasal cannula with concurrent increased work of breathing.  ABG reviewed showing compensated metabolic acidosis with a pH of 7.431 and hypoxia with a PaCO2 of 65.9 on 7 L high flow nasal cannula.    Repeat chest x-ray stat.  Check lactic acid.  Check respiratory culture.  Expand antibiotics to include ceftriaxone and vancomycin given worsening leukocytosis.    Continue to monitor respiratory status, titrate supplemental oxygen maintain SpO2 greater than 90%.  The patient continues to have persistent hypoxia and increased work of breathing, may need to reconsider transfer to the intensive care unit.    Addendum: CXR reviewed showing evidence of pulmonary vascular congestion, IVF discontinued, 40mg IV lasix ordered x 1.    Patient was placed in face mask upon entering room and kept mask on throughout our encounter. I wore full protective equipment throughout this patient encounter including a face mask, gown and gloves. Hand hygiene was performed before donning protective equipment and after removal when leaving the room.    Jie Hoyos, APRN  6/5/2024  06:34 EDT      Much of this encounter note is an electronic transcription/translation of spoken language to printed text using Dragon Software.

## 2024-06-05 NOTE — PLAN OF CARE
Pt transferred from  to ICU. She is AAOx4, BALES, O2 via HFNC at 9 L/m. VSS, RR 20s-30s et pt c/o intermittent SOA.  at bedside.

## 2024-06-05 NOTE — CASE MANAGEMENT/SOCIAL WORK
Continued Stay Note  [facility]     Patient Name: Yuliana Moeller  MRN: [id number]  Today's Date: 6/5/2024    Admit Date: 6/3/2024    Plan: home with family; follow for O2 needs   Discharge Plan       Row Name 06/05/24 1421       Plan    Plan home with family; follow for O2 needs    Plan Comments Discussed in huddle. Pt plans home with family. Currently on 7L High Flow. CCP to follow for O2 needs.                   Discharge Codes    No documentation.                 Expected Discharge Date and Time       Expected Discharge Date Expected Discharge Time    Jun 7, 2024               DEEJAY Del Cid

## 2024-06-06 ENCOUNTER — APPOINTMENT (OUTPATIENT)
Dept: CT IMAGING | Facility: HOSPITAL | Age: 59
End: 2024-06-06
Payer: COMMERCIAL

## 2024-06-06 ENCOUNTER — APPOINTMENT (OUTPATIENT)
Dept: CARDIOLOGY | Facility: HOSPITAL | Age: 59
End: 2024-06-06
Payer: COMMERCIAL

## 2024-06-06 LAB
ANION GAP SERPL CALCULATED.3IONS-SCNC: 13 MMOL/L (ref 5–15)
ANION GAP SERPL CALCULATED.3IONS-SCNC: 14.2 MMOL/L (ref 5–15)
AORTIC DIMENSIONLESS INDEX: 0.8 (DI)
ASCENDING AORTA: 2.8 CM
BH CV ECHO MEAS - ACS: 2.03 CM
BH CV ECHO MEAS - AI P1/2T: 630.7 MSEC
BH CV ECHO MEAS - AO MAX PG: 10.6 MMHG
BH CV ECHO MEAS - AO MEAN PG: 5.6 MMHG
BH CV ECHO MEAS - AO ROOT DIAM: 2.7 CM
BH CV ECHO MEAS - AO V2 MAX: 162.9 CM/SEC
BH CV ECHO MEAS - AO V2 VTI: 31.5 CM
BH CV ECHO MEAS - AVA(I,D): 2.46 CM2
BH CV ECHO MEAS - EDV(CUBED): 98.7 ML
BH CV ECHO MEAS - EDV(MOD-SP2): 59 ML
BH CV ECHO MEAS - EDV(MOD-SP4): 59 ML
BH CV ECHO MEAS - EF(MOD-BP): 63.1 %
BH CV ECHO MEAS - EF(MOD-SP2): 62.7 %
BH CV ECHO MEAS - EF(MOD-SP4): 64.4 %
BH CV ECHO MEAS - ESV(MOD-SP2): 22 ML
BH CV ECHO MEAS - ESV(MOD-SP4): 21 ML
BH CV ECHO MEAS - FS: 27.9 %
BH CV ECHO MEAS - IVS/LVPW: 0.81 CM
BH CV ECHO MEAS - IVSD: 0.69 CM
BH CV ECHO MEAS - LV DIASTOLIC VOL/BSA (35-75): 33.3 CM2
BH CV ECHO MEAS - LV MASS(C)D: 113.3 GRAMS
BH CV ECHO MEAS - LV MAX PG: 5.2 MMHG
BH CV ECHO MEAS - LV MEAN PG: 3 MMHG
BH CV ECHO MEAS - LV SYSTOLIC VOL/BSA (12-30): 11.9 CM2
BH CV ECHO MEAS - LV V1 MAX: 114.5 CM/SEC
BH CV ECHO MEAS - LV V1 VTI: 24.8 CM
BH CV ECHO MEAS - LVIDD: 4.6 CM
BH CV ECHO MEAS - LVIDS: 3.3 CM
BH CV ECHO MEAS - LVOT AREA: 3.1 CM2
BH CV ECHO MEAS - LVOT DIAM: 1.99 CM
BH CV ECHO MEAS - LVPWD: 0.85 CM
BH CV ECHO MEAS - MED PEAK E' VEL: 8.1 CM/SEC
BH CV ECHO MEAS - MR MAX PG: 56.5 MMHG
BH CV ECHO MEAS - MR MAX VEL: 375.7 CM/SEC
BH CV ECHO MEAS - MV A DUR: 0.12 SEC
BH CV ECHO MEAS - MV A MAX VEL: 86.4 CM/SEC
BH CV ECHO MEAS - MV DEC SLOPE: 734.3 CM/SEC2
BH CV ECHO MEAS - MV DEC TIME: 0.16 SEC
BH CV ECHO MEAS - MV E MAX VEL: 110 CM/SEC
BH CV ECHO MEAS - MV E/A: 1.27
BH CV ECHO MEAS - MV MAX PG: 5.1 MMHG
BH CV ECHO MEAS - MV MEAN PG: 3.1 MMHG
BH CV ECHO MEAS - MV P1/2T: 44.2 MSEC
BH CV ECHO MEAS - MV V2 VTI: 25 CM
BH CV ECHO MEAS - MVA(P1/2T): 5 CM2
BH CV ECHO MEAS - MVA(VTI): 3.1 CM2
BH CV ECHO MEAS - PA V2 MAX: 97.7 CM/SEC
BH CV ECHO MEAS - PULM A REVS DUR: 0.11 SEC
BH CV ECHO MEAS - PULM A REVS VEL: 31.1 CM/SEC
BH CV ECHO MEAS - PULM DIAS VEL: 19.2 CM/SEC
BH CV ECHO MEAS - PULM S/D: 1.36
BH CV ECHO MEAS - PULM SYS VEL: 26.1 CM/SEC
BH CV ECHO MEAS - QP/QS: 0.6
BH CV ECHO MEAS - RAP SYSTOLE: 3 MMHG
BH CV ECHO MEAS - RV MAX PG: 2.31 MMHG
BH CV ECHO MEAS - RV V1 MAX: 76 CM/SEC
BH CV ECHO MEAS - RV V1 VTI: 16.4 CM
BH CV ECHO MEAS - RVOT DIAM: 1.9 CM
BH CV ECHO MEAS - RVSP: 38 MMHG
BH CV ECHO MEAS - SV(LVOT): 77.5 ML
BH CV ECHO MEAS - SV(MOD-SP2): 37 ML
BH CV ECHO MEAS - SV(MOD-SP4): 38 ML
BH CV ECHO MEAS - SV(RVOT): 46.3 ML
BH CV ECHO MEAS - SVI(LVOT): 43.7 ML/M2
BH CV ECHO MEAS - SVI(MOD-SP2): 20.9 ML/M2
BH CV ECHO MEAS - SVI(MOD-SP4): 21.5 ML/M2
BH CV ECHO MEAS - TAPSE (>1.6): 1.9 CM
BH CV ECHO MEAS - TR MAX PG: 35.4 MMHG
BH CV ECHO MEAS - TR MAX VEL: 297.5 CM/SEC
BH CV XLRA - RV BASE: 3.1 CM
BH CV XLRA - RV LENGTH: 6.9 CM
BH CV XLRA - RV MID: 1.82 CM
BH CV XLRA - TDI S': 9.6 CM/SEC
BUN SERPL-MCNC: 7 MG/DL (ref 6–20)
BUN SERPL-MCNC: 9 MG/DL (ref 6–20)
BUN/CREAT SERPL: 14.3 (ref 7–25)
BUN/CREAT SERPL: 15 (ref 7–25)
CALCIUM SPEC-SCNC: 8.3 MG/DL (ref 8.6–10.5)
CALCIUM SPEC-SCNC: 8.7 MG/DL (ref 8.6–10.5)
CHLORIDE SERPL-SCNC: 91 MMOL/L (ref 98–107)
CHLORIDE SERPL-SCNC: 93 MMOL/L (ref 98–107)
CO2 SERPL-SCNC: 26 MMOL/L (ref 22–29)
CO2 SERPL-SCNC: 26.8 MMOL/L (ref 22–29)
CREAT SERPL-MCNC: 0.49 MG/DL (ref 0.57–1)
CREAT SERPL-MCNC: 0.6 MG/DL (ref 0.57–1)
DEPRECATED RDW RBC AUTO: 40.9 FL (ref 37–54)
EGFRCR SERPLBLD CKD-EPI 2021: 104.8 ML/MIN/1.73
EGFRCR SERPLBLD CKD-EPI 2021: 110.1 ML/MIN/1.73
ERYTHROCYTE [DISTWIDTH] IN BLOOD BY AUTOMATED COUNT: 13.2 % (ref 12.3–15.4)
GLUCOSE BLDC GLUCOMTR-MCNC: 170 MG/DL (ref 70–130)
GLUCOSE BLDC GLUCOMTR-MCNC: 189 MG/DL (ref 70–130)
GLUCOSE BLDC GLUCOMTR-MCNC: 212 MG/DL (ref 70–130)
GLUCOSE BLDC GLUCOMTR-MCNC: 301 MG/DL (ref 70–130)
GLUCOSE SERPL-MCNC: 190 MG/DL (ref 65–99)
GLUCOSE SERPL-MCNC: 269 MG/DL (ref 65–99)
HCT VFR BLD AUTO: 29.9 % (ref 34–46.6)
HGB BLD-MCNC: 10.3 G/DL (ref 12–15.9)
LEFT ATRIUM VOLUME INDEX: 26.6 ML/M2
MCH RBC QN AUTO: 29.1 PG (ref 26.6–33)
MCHC RBC AUTO-ENTMCNC: 34.4 G/DL (ref 31.5–35.7)
MCV RBC AUTO: 84.5 FL (ref 79–97)
PLATELET # BLD AUTO: 370 10*3/MM3 (ref 140–450)
PMV BLD AUTO: 8.3 FL (ref 6–12)
POTASSIUM SERPL-SCNC: 3.1 MMOL/L (ref 3.5–5.2)
POTASSIUM SERPL-SCNC: 3.7 MMOL/L (ref 3.5–5.2)
RBC # BLD AUTO: 3.54 10*6/MM3 (ref 3.77–5.28)
SINUS: 2.5 CM
SODIUM SERPL-SCNC: 132 MMOL/L (ref 136–145)
SODIUM SERPL-SCNC: 132 MMOL/L (ref 136–145)
STJ: 2.38 CM
WBC NRBC COR # BLD AUTO: 18.42 10*3/MM3 (ref 3.4–10.8)

## 2024-06-06 PROCEDURE — 63710000001 INSULIN GLARGINE PER 5 UNITS: Performed by: INTERNAL MEDICINE

## 2024-06-06 PROCEDURE — 93306 TTE W/DOPPLER COMPLETE: CPT

## 2024-06-06 PROCEDURE — 25010000002 HEPARIN (PORCINE) PER 1000 UNITS: Performed by: HOSPITALIST

## 2024-06-06 PROCEDURE — 93306 TTE W/DOPPLER COMPLETE: CPT | Performed by: INTERNAL MEDICINE

## 2024-06-06 PROCEDURE — 94761 N-INVAS EAR/PLS OXIMETRY MLT: CPT

## 2024-06-06 PROCEDURE — 63710000001 INSULIN LISPRO (HUMAN) PER 5 UNITS: Performed by: HOSPITALIST

## 2024-06-06 PROCEDURE — 71250 CT THORAX DX C-: CPT

## 2024-06-06 PROCEDURE — 82948 REAGENT STRIP/BLOOD GLUCOSE: CPT

## 2024-06-06 PROCEDURE — 76376 3D RENDER W/INTRP POSTPROCES: CPT

## 2024-06-06 PROCEDURE — 94799 UNLISTED PULMONARY SVC/PX: CPT

## 2024-06-06 PROCEDURE — 80048 BASIC METABOLIC PNL TOTAL CA: CPT | Performed by: HOSPITALIST

## 2024-06-06 PROCEDURE — 25010000002 PIPERACILLIN SOD-TAZOBACTAM PER 1 G: Performed by: HOSPITALIST

## 2024-06-06 PROCEDURE — 80048 BASIC METABOLIC PNL TOTAL CA: CPT | Performed by: INTERNAL MEDICINE

## 2024-06-06 PROCEDURE — 94664 DEMO&/EVAL PT USE INHALER: CPT

## 2024-06-06 PROCEDURE — 25010000002 FUROSEMIDE PER 20 MG: Performed by: INTERNAL MEDICINE

## 2024-06-06 PROCEDURE — 85027 COMPLETE CBC AUTOMATED: CPT | Performed by: INTERNAL MEDICINE

## 2024-06-06 PROCEDURE — 25010000002 PIPERACILLIN SOD-TAZOBACTAM PER 1 G: Performed by: INTERNAL MEDICINE

## 2024-06-06 RX ORDER — INSULIN LISPRO 100 [IU]/ML
4-24 INJECTION, SOLUTION INTRAVENOUS; SUBCUTANEOUS
Status: DISCONTINUED | OUTPATIENT
Start: 2024-06-06 | End: 2024-06-11 | Stop reason: HOSPADM

## 2024-06-06 RX ORDER — POTASSIUM CHLORIDE 750 MG/1
40 TABLET, FILM COATED, EXTENDED RELEASE ORAL EVERY 4 HOURS
Status: COMPLETED | OUTPATIENT
Start: 2024-06-06 | End: 2024-06-07

## 2024-06-06 RX ORDER — DEXTROSE MONOHYDRATE 25 G/50ML
25 INJECTION, SOLUTION INTRAVENOUS
Status: DISCONTINUED | OUTPATIENT
Start: 2024-06-06 | End: 2024-06-11 | Stop reason: HOSPADM

## 2024-06-06 RX ORDER — HEPARIN SODIUM 5000 [USP'U]/ML
5000 INJECTION, SOLUTION INTRAVENOUS; SUBCUTANEOUS EVERY 8 HOURS SCHEDULED
Status: DISCONTINUED | OUTPATIENT
Start: 2024-06-06 | End: 2024-06-11 | Stop reason: HOSPADM

## 2024-06-06 RX ORDER — NICOTINE POLACRILEX 4 MG
15 LOZENGE BUCCAL
Status: DISCONTINUED | OUTPATIENT
Start: 2024-06-06 | End: 2024-06-11 | Stop reason: HOSPADM

## 2024-06-06 RX ORDER — IBUPROFEN 600 MG/1
1 TABLET ORAL
Status: DISCONTINUED | OUTPATIENT
Start: 2024-06-06 | End: 2024-06-11 | Stop reason: HOSPADM

## 2024-06-06 RX ADMIN — POTASSIUM CHLORIDE 40 MEQ: 750 TABLET, EXTENDED RELEASE ORAL at 19:03

## 2024-06-06 RX ADMIN — PIPERACILLIN AND TAZOBACTAM 3.38 G: 3; .375 INJECTION, POWDER, FOR SOLUTION INTRAVENOUS at 17:50

## 2024-06-06 RX ADMIN — DEXTROMETHORPHAN POLISTIREX 60 MG: 30 SUSPENSION ORAL at 21:17

## 2024-06-06 RX ADMIN — INSULIN LISPRO 5 UNITS: 100 INJECTION, SOLUTION INTRAVENOUS; SUBCUTANEOUS at 08:29

## 2024-06-06 RX ADMIN — HEPARIN SODIUM 5000 UNITS: 5000 INJECTION INTRAVENOUS; SUBCUTANEOUS at 21:15

## 2024-06-06 RX ADMIN — IPRATROPIUM BROMIDE AND ALBUTEROL SULFATE 3 ML: .5; 3 SOLUTION RESPIRATORY (INHALATION) at 19:25

## 2024-06-06 RX ADMIN — INSULIN LISPRO 4 UNITS: 100 INJECTION, SOLUTION INTRAVENOUS; SUBCUTANEOUS at 21:15

## 2024-06-06 RX ADMIN — HEPARIN SODIUM 5000 UNITS: 5000 INJECTION INTRAVENOUS; SUBCUTANEOUS at 14:54

## 2024-06-06 RX ADMIN — Medication 10 ML: at 08:30

## 2024-06-06 RX ADMIN — GUAIFENESIN 1200 MG: 600 TABLET, EXTENDED RELEASE ORAL at 21:16

## 2024-06-06 RX ADMIN — INSULIN LISPRO 4 UNITS: 100 INJECTION, SOLUTION INTRAVENOUS; SUBCUTANEOUS at 12:03

## 2024-06-06 RX ADMIN — Medication 10 ML: at 21:16

## 2024-06-06 RX ADMIN — GUAIFENESIN 1200 MG: 600 TABLET, EXTENDED RELEASE ORAL at 08:29

## 2024-06-06 RX ADMIN — PIPERACILLIN AND TAZOBACTAM 3.38 G: 3; .375 INJECTION, POWDER, FOR SOLUTION INTRAVENOUS at 01:12

## 2024-06-06 RX ADMIN — ASPIRIN 81 MG: 81 TABLET, CHEWABLE ORAL at 08:29

## 2024-06-06 RX ADMIN — IPRATROPIUM BROMIDE AND ALBUTEROL SULFATE 3 ML: .5; 3 SOLUTION RESPIRATORY (INHALATION) at 13:55

## 2024-06-06 RX ADMIN — FUROSEMIDE 60 MG: 10 INJECTION, SOLUTION INTRAMUSCULAR; INTRAVENOUS at 14:54

## 2024-06-06 RX ADMIN — PIPERACILLIN AND TAZOBACTAM 3.38 G: 3; .375 INJECTION, POWDER, FOR SOLUTION INTRAVENOUS at 10:04

## 2024-06-06 RX ADMIN — FUROSEMIDE 60 MG: 10 INJECTION, SOLUTION INTRAMUSCULAR; INTRAVENOUS at 01:12

## 2024-06-06 RX ADMIN — DEXTROMETHORPHAN POLISTIREX 60 MG: 30 SUSPENSION ORAL at 08:33

## 2024-06-06 RX ADMIN — SERTRALINE 100 MG: 100 TABLET, FILM COATED ORAL at 08:33

## 2024-06-06 RX ADMIN — INSULIN LISPRO 16 UNITS: 100 INJECTION, SOLUTION INTRAVENOUS; SUBCUTANEOUS at 17:10

## 2024-06-06 RX ADMIN — INSULIN GLARGINE 20 UNITS: 100 INJECTION, SOLUTION SUBCUTANEOUS at 21:15

## 2024-06-06 RX ADMIN — IPRATROPIUM BROMIDE AND ALBUTEROL SULFATE 3 ML: .5; 3 SOLUTION RESPIRATORY (INHALATION) at 06:34

## 2024-06-06 NOTE — PROGRESS NOTES
"  Intensive Care Unit Daily Progress Note.   [facility]ARE  6/6/2024    Patient Name:  Yuliana Moeller  MRN:  [id number]  YOB: 1965  Age: 58 y.o.  Sex: female         Reason for Admission / Chief Complaint:  Hypoxia, shortness of breath    Hospital Course:   58-year-old female who presented to the emergency department with cough, shortness of breath, subjective fevers and admitted for left-sided pneumonia with worsening hypoxia for which she was ultimately transferred to the ICU for further management.    Interval History:  Transferred to the ICU yesterday  Net -3.4 L over the past 24 hours  Patient states that breathing continues to remain labored  Mild cough without any sputum production  No fevers or chills  No nausea or vomiting  No chest pain or palpitations  No history of asthma or COPD  No history of frequent lung infections  Does not use any bronchodilators  No immunocompromise state  No exposure history    Physical Exam:  /71   Pulse 88   Temp 97.8 °F (36.6 °C) (Oral)   Resp (!) 32   Ht 149.9 cm (59\")   Wt 83 kg (182 lb 14.4 oz)   SpO2 91%   BMI 36.94 kg/m²   Body mass index is 36.94 kg/m².    Intake/Output    Intake/Output Summary (Last 24 hours) at 6/6/2024 0847  Last data filed at 6/6/2024 0600  Gross per 24 hour   Intake 560 ml   Output 3550 ml   Net -2990 ml     General: Alert, nontoxic, NAD, mild  HEENT: NC/AT, EOMI, MMM  Neck: Supple, trachea midline  Cardiac: RRR, no murmur, gallops, rubs  Pulmonary: Coarse bilaterally  GI: Soft, non-tender, non-distended, normal bowel sounds  Extremities: Warm, well perfused, no LE edema  Skin: no visible rash  Neuro: CN II - XII grossly intact  Psychiatry: Normal mood and affect      Data Review:  Notable Labs:  Results from last 7 days   Lab Units 06/06/24  0415 06/05/24  0610 06/05/24  0424 06/04/24  0425 06/03/24  1738   WBC 10*3/mm3 18.42*  --  16.25* 12.87* 14.26*   HEMOGLOBIN g/dL 10.3*  --  10.0* 10.2* " 11.8*   HEMOGLOBIN, POC g/dL  --  10.7*  --   --   --    PLATELETS 10*3/mm3 370  --  324 277 263     Results from last 7 days   Lab Units 06/06/24 0415 06/05/24 2302 06/05/24  1813 06/05/24  1035 06/05/24 0424 06/04/24  2230 06/04/24  1633 06/04/24  1202 06/04/24  0425 06/03/24  1759   SODIUM mmol/L 132* 130*  130* 129* 128* 127* 127* 128*   < > 125* 123*   POTASSIUM mmol/L 3.7 3.7  3.7 4.1 3.4* 3.9 3.9 4.2   < > 3.4* 3.0*   CHLORIDE mmol/L 93* 93*  93* 91* 93* 95* 94* 93*   < > 92* 93*   CO2 mmol/L 26.0 25.0  25.0 28.0 24.0 21.0* 24.0 21.9*   < > 24.0 20.0*   BUN mg/dL 9 8  8 7 8 8 8 9   < > 12 12   CREATININE mg/dL 0.60 0.54*  0.54* 0.64 0.58 0.48* 0.54* 0.53*   < > 0.61 0.58   GLUCOSE mg/dL 190* 146*  146* 299* 259* 202* 211* 229*   < > 201* 148*   CALCIUM mg/dL 8.3* 8.4*  8.4* 8.4* 8.2* 7.9* 8.1* 7.9*   < > 8.0* 7.9*   MAGNESIUM mg/dL  --   --   --   --   --   --   --   --  2.1 1.9    < > = values in this interval not displayed.   Estimated Creatinine Clearance: 102.1 mL/min (by C-G formula based on SCr of 0.6 mg/dL).    Results from last 7 days   Lab Units 06/06/24 0415 06/05/24 2302 06/05/24  1018 06/05/24 0424 06/04/24 0425 06/03/24  1759   AST (SGOT) U/L  --  141*  --  99* 74* 80*   ALT (SGPT) U/L  --  118*  --  82* 62* 61*   PROCALCITONIN ng/mL  --  0.23  --   --  0.24  --    LACTATE mmol/L  --   --  1.4  --   --  1.1   PLATELETS 10*3/mm3 370  --   --  324 277  --        Results from last 7 days   Lab Units 06/05/24  0610   PH, ARTERIAL pH units 7.431   PCO2, ARTERIAL mm Hg 31.6*   PO2 ART mm Hg 65.9*   HCO3 ART mmol/L 21.0*       Imaging:  Reviewed chest images personally from past 3 days    ASSESSMENT  /  PLAN:    Acute hypoxic respiratory failure  Bilateral pulmonary infiltrates  Pneumonia  Suspected fluid overload  Obesity  Type 2 diabetes mellitus    -Patient initially presented to the hospital with dyspnea and shortness of breath and found to of pulmonary infiltrate.  Worsening hypoxia  and ultimately transferred to the ICU for further management.  Currently on 10 L nasal cannula with appropriate saturations.  Chest x-ray demonstrates bilateral pulmonary infiltrates which have worsened since admission to the hospital.  -Worsening hypoxia despite diuresis  -Echocardiogram pending  -Will get a CT chest for further evaluation  -Infectious workup no growth to date  -Empiric therapy with Zosyn at this time  -MRSA negative, strep pneumo and Legionella negative  -If worsening respiratory status, will need to consider Optiflow/BiPAP.    All issues new to me today.  Prior hospital course, labs and imaging reviewed.    GI prophylaxis: Not indicated  DVT prophylaxis: Heparin  Faye catheter: No  Bowel regimen: Ordered  Diet: Diabetic    Discharge: Continue to monitor in the ICU due to critical status    Critical Care Time: 35 minutes    Devaughn Schultz MD  [facility]  Pulmonary and Critical Care Medicine, Interventional Pulmonology    Parts of this note may be an electronic transcription/translation of spoken language to printed text using the Dragon dictation system.

## 2024-06-06 NOTE — PROGRESS NOTES
[facility]    Patient transferred to the ICU yesterday evening.  Management as per intensivist service.  [facility] will follow peripherally and resume care when she is out of intensive care.    Virgil Khalil MD

## 2024-06-06 NOTE — PLAN OF CARE
Problem: Adult Inpatient Plan of Care  Goal: Plan of Care Review  Outcome: Ongoing, Progressing  Flowsheets (Taken 6/5/2024 5908)  Progress: no change  Plan of Care Reviewed With: patient   Goal Outcome Evaluation:  Plan of Care Reviewed With: patient        Progress: no change       Pt a&o x4, follows commands x4. Pt tachypnic, on 10 L High flow nasal cannula. Tachypnea slightly improved throughout night, SpO2 stable. AM sodium improved to 132. Adequate urine output, multiple loose stools overnight. No complaints of pain. Needs assessed and met throughout shift.

## 2024-06-06 NOTE — PLAN OF CARE
Goal Outcome Evaluation:      Patient remains in ICU on 8 liters of hi flow nasal cannula. Patient up to bedside commode. Adequate urine output. Patient went down for ct of the chest today. Patient and  educated at beside about bringing extra fluids in and administering to the patient. Patient is on a 2000ml fluid restriction.

## 2024-06-07 LAB
ALBUMIN SERPL-MCNC: 2.9 G/DL (ref 3.5–5.2)
ALP SERPL-CCNC: 255 U/L (ref 39–117)
ALT SERPL W P-5'-P-CCNC: 217 U/L (ref 1–33)
ANION GAP SERPL CALCULATED.3IONS-SCNC: 11.5 MMOL/L (ref 5–15)
ANION GAP SERPL CALCULATED.3IONS-SCNC: 15 MMOL/L (ref 5–15)
AST SERPL-CCNC: 277 U/L (ref 1–32)
BASOPHILS # BLD MANUAL: 0.16 10*3/MM3 (ref 0–0.2)
BASOPHILS NFR BLD MANUAL: 1 % (ref 0–1.5)
BILIRUB CONJ SERPL-MCNC: <0.2 MG/DL (ref 0–0.3)
BILIRUB INDIRECT SERPL-MCNC: ABNORMAL MG/DL
BILIRUB SERPL-MCNC: 0.4 MG/DL (ref 0–1.2)
BUN SERPL-MCNC: 11 MG/DL (ref 6–20)
BUN SERPL-MCNC: 9 MG/DL (ref 6–20)
BUN/CREAT SERPL: 17.6 (ref 7–25)
BUN/CREAT SERPL: 17.7 (ref 7–25)
CALCIUM SPEC-SCNC: 8.5 MG/DL (ref 8.6–10.5)
CALCIUM SPEC-SCNC: 9 MG/DL (ref 8.6–10.5)
CHLORIDE SERPL-SCNC: 93 MMOL/L (ref 98–107)
CHLORIDE SERPL-SCNC: 98 MMOL/L (ref 98–107)
CO2 SERPL-SCNC: 25 MMOL/L (ref 22–29)
CO2 SERPL-SCNC: 27.5 MMOL/L (ref 22–29)
CREAT SERPL-MCNC: 0.51 MG/DL (ref 0.57–1)
CREAT SERPL-MCNC: 0.62 MG/DL (ref 0.57–1)
DEPRECATED RDW RBC AUTO: 40.2 FL (ref 37–54)
EGFRCR SERPLBLD CKD-EPI 2021: 104 ML/MIN/1.73
EGFRCR SERPLBLD CKD-EPI 2021: 109 ML/MIN/1.73
EOSINOPHIL # BLD MANUAL: 0.33 10*3/MM3 (ref 0–0.4)
EOSINOPHIL NFR BLD MANUAL: 2 % (ref 0.3–6.2)
ERYTHROCYTE [DISTWIDTH] IN BLOOD BY AUTOMATED COUNT: 13.2 % (ref 12.3–15.4)
GLUCOSE BLDC GLUCOMTR-MCNC: 143 MG/DL (ref 70–130)
GLUCOSE BLDC GLUCOMTR-MCNC: 259 MG/DL (ref 70–130)
GLUCOSE BLDC GLUCOMTR-MCNC: 296 MG/DL (ref 70–130)
GLUCOSE BLDC GLUCOMTR-MCNC: 378 MG/DL (ref 70–130)
GLUCOSE SERPL-MCNC: 124 MG/DL (ref 65–99)
GLUCOSE SERPL-MCNC: 352 MG/DL (ref 65–99)
HCT VFR BLD AUTO: 29.4 % (ref 34–46.6)
HGB BLD-MCNC: 9.9 G/DL (ref 12–15.9)
LYMPHOCYTES # BLD MANUAL: 1.47 10*3/MM3 (ref 0.7–3.1)
LYMPHOCYTES NFR BLD MANUAL: 3 % (ref 5–12)
MAGNESIUM SERPL-MCNC: 2 MG/DL (ref 1.6–2.6)
MCH RBC QN AUTO: 28.4 PG (ref 26.6–33)
MCHC RBC AUTO-ENTMCNC: 33.7 G/DL (ref 31.5–35.7)
MCV RBC AUTO: 84.5 FL (ref 79–97)
METAMYELOCYTES NFR BLD MANUAL: 1 % (ref 0–0)
MONOCYTES # BLD: 0.49 10*3/MM3 (ref 0.1–0.9)
MYELOCYTES NFR BLD MANUAL: 2 % (ref 0–0)
NEUTROPHILS # BLD AUTO: 13.43 10*3/MM3 (ref 1.7–7)
NEUTROPHILS NFR BLD MANUAL: 82 % (ref 42.7–76)
NRBC BLD AUTO-RTO: 0 /100 WBC (ref 0–0.2)
PHOSPHATE SERPL-MCNC: 3.9 MG/DL (ref 2.5–4.5)
PLAT MORPH BLD: NORMAL
PLATELET # BLD AUTO: 398 10*3/MM3 (ref 140–450)
PMV BLD AUTO: 8.3 FL (ref 6–12)
POTASSIUM SERPL-SCNC: 4.1 MMOL/L (ref 3.5–5.2)
POTASSIUM SERPL-SCNC: 4.1 MMOL/L (ref 3.5–5.2)
PROT SERPL-MCNC: 6.7 G/DL (ref 6–8.5)
RBC # BLD AUTO: 3.48 10*6/MM3 (ref 3.77–5.28)
RBC MORPH BLD: NORMAL
SODIUM SERPL-SCNC: 133 MMOL/L (ref 136–145)
SODIUM SERPL-SCNC: 137 MMOL/L (ref 136–145)
VARIANT LYMPHS NFR BLD MANUAL: 9 % (ref 19.6–45.3)
WBC MORPH BLD: NORMAL
WBC NRBC COR # BLD AUTO: 16.38 10*3/MM3 (ref 3.4–10.8)

## 2024-06-07 PROCEDURE — 63710000001 INSULIN GLARGINE PER 5 UNITS: Performed by: INTERNAL MEDICINE

## 2024-06-07 PROCEDURE — 83735 ASSAY OF MAGNESIUM: CPT | Performed by: HOSPITALIST

## 2024-06-07 PROCEDURE — 86235 NUCLEAR ANTIGEN ANTIBODY: CPT | Performed by: HOSPITALIST

## 2024-06-07 PROCEDURE — 86671 FUNGUS NES ANTIBODY: CPT | Performed by: HOSPITALIST

## 2024-06-07 PROCEDURE — 82948 REAGENT STRIP/BLOOD GLUCOSE: CPT

## 2024-06-07 PROCEDURE — 80076 HEPATIC FUNCTION PANEL: CPT | Performed by: HOSPITALIST

## 2024-06-07 PROCEDURE — 86606 ASPERGILLUS ANTIBODY: CPT | Performed by: HOSPITALIST

## 2024-06-07 PROCEDURE — 25010000002 METHYLPREDNISOLONE PER 125 MG: Performed by: HOSPITALIST

## 2024-06-07 PROCEDURE — 86609 BACTERIUM ANTIBODY: CPT | Performed by: HOSPITALIST

## 2024-06-07 PROCEDURE — 94799 UNLISTED PULMONARY SVC/PX: CPT

## 2024-06-07 PROCEDURE — 94761 N-INVAS EAR/PLS OXIMETRY MLT: CPT

## 2024-06-07 PROCEDURE — 86225 DNA ANTIBODY NATIVE: CPT | Performed by: HOSPITALIST

## 2024-06-07 PROCEDURE — 25010000002 HEPARIN (PORCINE) PER 1000 UNITS: Performed by: HOSPITALIST

## 2024-06-07 PROCEDURE — 25010000002 PIPERACILLIN SOD-TAZOBACTAM PER 1 G: Performed by: INTERNAL MEDICINE

## 2024-06-07 PROCEDURE — 85025 COMPLETE CBC W/AUTO DIFF WBC: CPT | Performed by: HOSPITALIST

## 2024-06-07 PROCEDURE — 25010000002 FUROSEMIDE PER 20 MG: Performed by: INTERNAL MEDICINE

## 2024-06-07 PROCEDURE — 63710000001 INSULIN LISPRO (HUMAN) PER 5 UNITS: Performed by: HOSPITALIST

## 2024-06-07 PROCEDURE — 94664 DEMO&/EVAL PT USE INHALER: CPT

## 2024-06-07 PROCEDURE — 86602 ANTINOMYCES ANTIBODY: CPT | Performed by: HOSPITALIST

## 2024-06-07 PROCEDURE — 84100 ASSAY OF PHOSPHORUS: CPT | Performed by: HOSPITALIST

## 2024-06-07 PROCEDURE — 86331 IMMUNODIFFUSION OUCHTERLONY: CPT | Performed by: HOSPITALIST

## 2024-06-07 PROCEDURE — 85007 BL SMEAR W/DIFF WBC COUNT: CPT | Performed by: HOSPITALIST

## 2024-06-07 PROCEDURE — 80048 BASIC METABOLIC PNL TOTAL CA: CPT | Performed by: HOSPITALIST

## 2024-06-07 RX ORDER — METHYLPREDNISOLONE SODIUM SUCCINATE 125 MG/2ML
60 INJECTION, POWDER, LYOPHILIZED, FOR SOLUTION INTRAMUSCULAR; INTRAVENOUS EVERY 12 HOURS
Status: DISCONTINUED | OUTPATIENT
Start: 2024-06-07 | End: 2024-06-08

## 2024-06-07 RX ADMIN — Medication 10 ML: at 08:27

## 2024-06-07 RX ADMIN — INSULIN GLARGINE 20 UNITS: 100 INJECTION, SOLUTION SUBCUTANEOUS at 20:49

## 2024-06-07 RX ADMIN — IPRATROPIUM BROMIDE AND ALBUTEROL SULFATE 3 ML: .5; 3 SOLUTION RESPIRATORY (INHALATION) at 07:04

## 2024-06-07 RX ADMIN — METHYLPREDNISOLONE SODIUM SUCCINATE 60 MG: 125 INJECTION INTRAMUSCULAR; INTRAVENOUS at 10:43

## 2024-06-07 RX ADMIN — ASPIRIN 81 MG: 81 TABLET, CHEWABLE ORAL at 08:25

## 2024-06-07 RX ADMIN — FLUTICASONE PROPIONATE 2 SPRAY: 50 SPRAY, METERED NASAL at 12:52

## 2024-06-07 RX ADMIN — PIPERACILLIN AND TAZOBACTAM 3.38 G: 3; .375 INJECTION, POWDER, FOR SOLUTION INTRAVENOUS at 02:48

## 2024-06-07 RX ADMIN — INSULIN LISPRO 20 UNITS: 100 INJECTION, SOLUTION INTRAVENOUS; SUBCUTANEOUS at 17:07

## 2024-06-07 RX ADMIN — HEPARIN SODIUM 5000 UNITS: 5000 INJECTION INTRAVENOUS; SUBCUTANEOUS at 22:08

## 2024-06-07 RX ADMIN — GUAIFENESIN 1200 MG: 600 TABLET, EXTENDED RELEASE ORAL at 08:25

## 2024-06-07 RX ADMIN — HEPARIN SODIUM 5000 UNITS: 5000 INJECTION INTRAVENOUS; SUBCUTANEOUS at 06:36

## 2024-06-07 RX ADMIN — Medication 10 ML: at 20:49

## 2024-06-07 RX ADMIN — IPRATROPIUM BROMIDE AND ALBUTEROL SULFATE 3 ML: .5; 3 SOLUTION RESPIRATORY (INHALATION) at 11:08

## 2024-06-07 RX ADMIN — IPRATROPIUM BROMIDE AND ALBUTEROL SULFATE 3 ML: .5; 3 SOLUTION RESPIRATORY (INHALATION) at 20:07

## 2024-06-07 RX ADMIN — DEXTROMETHORPHAN POLISTIREX 60 MG: 30 SUSPENSION ORAL at 08:26

## 2024-06-07 RX ADMIN — PIPERACILLIN AND TAZOBACTAM 3.38 G: 3; .375 INJECTION, POWDER, FOR SOLUTION INTRAVENOUS at 10:54

## 2024-06-07 RX ADMIN — HEPARIN SODIUM 5000 UNITS: 5000 INJECTION INTRAVENOUS; SUBCUTANEOUS at 15:41

## 2024-06-07 RX ADMIN — FUROSEMIDE 60 MG: 10 INJECTION, SOLUTION INTRAMUSCULAR; INTRAVENOUS at 02:49

## 2024-06-07 RX ADMIN — INSULIN LISPRO 12 UNITS: 100 INJECTION, SOLUTION INTRAVENOUS; SUBCUTANEOUS at 20:49

## 2024-06-07 RX ADMIN — PIPERACILLIN AND TAZOBACTAM 3.38 G: 3; .375 INJECTION, POWDER, FOR SOLUTION INTRAVENOUS at 18:23

## 2024-06-07 RX ADMIN — INSULIN LISPRO 12 UNITS: 100 INJECTION, SOLUTION INTRAVENOUS; SUBCUTANEOUS at 12:53

## 2024-06-07 RX ADMIN — FUROSEMIDE 60 MG: 10 INJECTION, SOLUTION INTRAMUSCULAR; INTRAVENOUS at 15:41

## 2024-06-07 RX ADMIN — POTASSIUM CHLORIDE 40 MEQ: 750 TABLET, EXTENDED RELEASE ORAL at 02:49

## 2024-06-07 RX ADMIN — METHYLPREDNISOLONE SODIUM SUCCINATE 60 MG: 125 INJECTION INTRAMUSCULAR; INTRAVENOUS at 22:08

## 2024-06-07 RX ADMIN — SERTRALINE 100 MG: 100 TABLET, FILM COATED ORAL at 08:25

## 2024-06-07 RX ADMIN — POTASSIUM CHLORIDE 40 MEQ: 750 TABLET, EXTENDED RELEASE ORAL at 00:18

## 2024-06-07 RX ADMIN — GUAIFENESIN 1200 MG: 600 TABLET, EXTENDED RELEASE ORAL at 20:49

## 2024-06-07 NOTE — PROGRESS NOTES
"  Intensive Care Unit Daily Progress Note.   [facility] INTENSIVE CARE  6/7/2024    Patient Name:  Yuliana Moeller  MRN:  [id number]  YOB: 1965  Age: 58 y.o.  Sex: female         Reason for Admission / Chief Complaint:  Hypoxia, shortness of breath    Hospital Course:   58-year-old female who presented to the emergency department with cough, shortness of breath, subjective fevers and admitted for left-sided pneumonia with worsening hypoxia for which she was ultimately transferred to the ICU for further management.    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  Also was using a  to wash off paint, however was less than 20 years old without any exposure to lead.    Physical Exam:  /82   Pulse 82   Temp 98.8 °F (37.1 °C) (Oral)   Resp 18   Ht 149.9 cm (59\")   Wt 83 kg (182 lb 14.4 oz)   SpO2 92%   BMI 36.94 kg/m²   Body mass index is 36.94 kg/m².    Intake/Output    Intake/Output Summary (Last 24 hours) at 6/7/2024 0857  Last data filed at 6/7/2024 0826  Gross per 24 hour   Intake 1394.19 ml   Output 3265 ml   Net -1870.81 ml     General: Alert, nontoxic, NAD, mild  HEENT: NC/AT, EOMI, MMM  Neck: Supple, trachea midline  Cardiac: RRR, no murmur, gallops, rubs  Pulmonary: Coarse bilaterally  GI: Soft, non-tender, non-distended, normal bowel sounds  Extremities: Warm, well perfused, no LE edema  Skin: no visible rash  Neuro: CN II - XII grossly intact  Psychiatry: Normal mood and affect      Data Review:  Notable Labs:  Results from last 7 days   Lab Units " 06/07/24  0028 06/06/24  0415 06/05/24  0610 06/05/24  0424 06/04/24  0425 06/03/24  1738   WBC 10*3/mm3 16.38* 18.42*  --  16.25* 12.87* 14.26*   HEMOGLOBIN g/dL 9.9* 10.3*  --  10.0* 10.2* 11.8*   HEMOGLOBIN, POC g/dL  --   --  10.7*  --   --   --    PLATELETS 10*3/mm3 398 370  --  324 277 263     Results from last 7 days   Lab Units 06/07/24  0642 06/07/24  0028 06/06/24  1742 06/06/24  0415 06/05/24  2302 06/05/24  1813 06/05/24  1035 06/05/24  0424 06/04/24  1202 06/04/24  0425 06/03/24  1759   SODIUM mmol/L 137  --  132* 132* 130*  130* 129* 128* 127*   < > 125* 123*   POTASSIUM mmol/L 4.1  --  3.1* 3.7 3.7  3.7 4.1 3.4* 3.9   < > 3.4* 3.0*   CHLORIDE mmol/L 98  --  91* 93* 93*  93* 91* 93* 95*   < > 92* 93*   CO2 mmol/L 27.5  --  26.8 26.0 25.0  25.0 28.0 24.0 21.0*   < > 24.0 20.0*   BUN mg/dL 9  --  7 9 8  8 7 8 8   < > 12 12   CREATININE mg/dL 0.51*  --  0.49* 0.60 0.54*  0.54* 0.64 0.58 0.48*   < > 0.61 0.58   GLUCOSE mg/dL 124*  --  269* 190* 146*  146* 299* 259* 202*   < > 201* 148*   CALCIUM mg/dL 8.5*  --  8.7 8.3* 8.4*  8.4* 8.4* 8.2* 7.9*   < > 8.0* 7.9*   MAGNESIUM mg/dL  --  2.0  --   --   --   --   --   --   --  2.1 1.9   PHOSPHORUS mg/dL  --  3.9  --   --   --   --   --   --   --   --   --     < > = values in this interval not displayed.   Estimated Creatinine Clearance: 120.1 mL/min (A) (by C-G formula based on SCr of 0.51 mg/dL (L)).    Results from last 7 days   Lab Units 06/07/24  0028 06/06/24  0415 06/05/24  2302 06/05/24  1018 06/05/24  0424 06/04/24  0425 06/03/24  1759   AST (SGOT) U/L  --   --  141*  --  99* 74* 80*   ALT (SGPT) U/L  --   --  118*  --  82* 62* 61*   PROCALCITONIN ng/mL  --   --  0.23  --   --  0.24  --    LACTATE mmol/L  --   --   --  1.4  --   --  1.1   PLATELETS 10*3/mm3 398 370  --   --  324 277  --        Results from last 7 days   Lab Units 06/05/24  0610   PH, ARTERIAL pH units 7.431   PCO2, ARTERIAL mm Hg 31.6*   PO2 ART mm Hg 65.9*   HCO3 ART mmol/L  21.0*       Imaging:  Reviewed chest images personally from past 3 days    ASSESSMENT  /  PLAN:    Acute hypoxic respiratory failure  Bilateral pulmonary infiltrates  Bilateral consolidations, left greater than right  Suspected fluid overload  Obesity  Type 2 diabetes mellitus    -Patient initially presented to the hospital with dyspnea and shortness of breath and found to of pulmonary infiltrate.  Worsening hypoxia and ultimately transferred to the ICU for further management.  Patient currently requiring 8 to 10 L nasal cannula with saturations greater than 90%.  Overall her clinical condition has not changed at all since admission.  -CT chest yesterday demonstrates bilateral pulmonary consolidations with left greater than right.  -Echocardiogram with preserved LVEF, normal diastolic function.  -No improvement after aggressive diuresis.  -Has been on broad-spectrum antibiotics for multiple days however leukocytosis remains stable, procalcitonin negative, hemodynamic stable.  My concern is that this is not an infectious pneumonia and possibly inflammatory in origin.  -Patient without any autoimmune history.  Will acquire QUINN and hypersensitivity panel.  -Will start patient on empiric steroids with Solu-Medrol 60 twice daily.  -Infectious workup no growth to date  -MRSA negative, strep pneumo and Legionella negative  -If worsening respiratory status, will need to consider Optiflow/BiPAP.    GI prophylaxis: Not indicated  DVT prophylaxis: Heparin  Faye catheter: No  Bowel regimen: Ordered  Diet: Diabetic    Discharge: Continue to monitor in the ICU due to critical status    Critical Care Time: 40 minutes    Devaughn Schultz MD  [facility]  Pulmonary and Critical Care Medicine, Interventional Pulmonology    Parts of this note may be an electronic transcription/translation of spoken language to printed text using the Dragon dictation system.

## 2024-06-07 NOTE — CASE MANAGEMENT/SOCIAL WORK
Continued Stay Note  [facility]     Patient Name: Yuliana Moeller  MRN: [id number]  Today's Date: 6/7/2024    Admit Date: 6/3/2024    Plan: home with family; follow for O2 needs   Discharge Plan       Row Name 06/07/24 0917       Plan    Plan Comments Patient remains on 9 L high-flow 02. CCP following. Patient plans home. Misti ALFONSO CCP                   Discharge Codes    No documentation.                 Expected Discharge Date and Time       Expected Discharge Date Expected Discharge Time    Jun 7, 2024               Misti Reyna RN

## 2024-06-07 NOTE — PLAN OF CARE
Problem: Adult Inpatient Plan of Care  Goal: Plan of Care Review  Outcome: Ongoing, Progressing  Flowsheets  Taken 6/6/2024 2100  Plan of Care Reviewed With: patient  Taken 6/5/2024 2337  Progress: no change   Goal Outcome Evaluation:  Plan of Care Reviewed With: patient        Progress: no change     Pt A&O x4, FC x4, on 9 L high flow NC. Vital signs stable. AM labs unremarkable. Ambulates to bedside commode. Adequate urine OP. 1 BM  this AM. No complaints of pain. Needs assessed and met throughout shift.

## 2024-06-07 NOTE — PLAN OF CARE
Goal Outcome Evaluation:      Patient remains in ICU on hi flow. Patent started on steroid today and reports that she feels better after days of stating no change. Patient had adequate UOP today. Patient will continue to be monitored in ICU.

## 2024-06-08 LAB
ANION GAP SERPL CALCULATED.3IONS-SCNC: 12 MMOL/L (ref 5–15)
ANION GAP SERPL CALCULATED.3IONS-SCNC: 13.7 MMOL/L (ref 5–15)
BACTERIA SPEC AEROBE CULT: NORMAL
BACTERIA SPEC AEROBE CULT: NORMAL
BASOPHILS # BLD MANUAL: 0 10*3/MM3 (ref 0–0.2)
BASOPHILS NFR BLD MANUAL: 0 % (ref 0–1.5)
BUN SERPL-MCNC: 14 MG/DL (ref 6–20)
BUN SERPL-MCNC: 20 MG/DL (ref 6–20)
BUN/CREAT SERPL: 26.4 (ref 7–25)
BUN/CREAT SERPL: 28.6 (ref 7–25)
CALCIUM SPEC-SCNC: 8.8 MG/DL (ref 8.6–10.5)
CALCIUM SPEC-SCNC: 9.2 MG/DL (ref 8.6–10.5)
CHLORIDE SERPL-SCNC: 88 MMOL/L (ref 98–107)
CHLORIDE SERPL-SCNC: 94 MMOL/L (ref 98–107)
CO2 SERPL-SCNC: 25.3 MMOL/L (ref 22–29)
CO2 SERPL-SCNC: 29 MMOL/L (ref 22–29)
CREAT SERPL-MCNC: 0.53 MG/DL (ref 0.57–1)
CREAT SERPL-MCNC: 0.7 MG/DL (ref 0.57–1)
DEPRECATED RDW RBC AUTO: 41.2 FL (ref 37–54)
EGFRCR SERPLBLD CKD-EPI 2021: 101 ML/MIN/1.73
EGFRCR SERPLBLD CKD-EPI 2021: 108 ML/MIN/1.73
EOSINOPHIL # BLD MANUAL: 0 10*3/MM3 (ref 0–0.4)
EOSINOPHIL NFR BLD MANUAL: 0 % (ref 0.3–6.2)
ERYTHROCYTE [DISTWIDTH] IN BLOOD BY AUTOMATED COUNT: 12.9 % (ref 12.3–15.4)
GLUCOSE BLDC GLUCOMTR-MCNC: 359 MG/DL (ref 70–130)
GLUCOSE BLDC GLUCOMTR-MCNC: 362 MG/DL (ref 70–130)
GLUCOSE BLDC GLUCOMTR-MCNC: 432 MG/DL (ref 70–130)
GLUCOSE BLDC GLUCOMTR-MCNC: 461 MG/DL (ref 70–130)
GLUCOSE BLDC GLUCOMTR-MCNC: 466 MG/DL (ref 70–130)
GLUCOSE BLDC GLUCOMTR-MCNC: 502 MG/DL (ref 70–130)
GLUCOSE BLDC GLUCOMTR-MCNC: 530 MG/DL (ref 70–130)
GLUCOSE SERPL-MCNC: 297 MG/DL (ref 65–99)
GLUCOSE SERPL-MCNC: 547 MG/DL (ref 65–99)
HCT VFR BLD AUTO: 35.2 % (ref 34–46.6)
HGB BLD-MCNC: 11.4 G/DL (ref 12–15.9)
LYMPHOCYTES # BLD MANUAL: 0.6 10*3/MM3 (ref 0.7–3.1)
LYMPHOCYTES NFR BLD MANUAL: 2.1 % (ref 5–12)
MAGNESIUM SERPL-MCNC: 2.2 MG/DL (ref 1.6–2.6)
MCH RBC QN AUTO: 28.4 PG (ref 26.6–33)
MCHC RBC AUTO-ENTMCNC: 32.4 G/DL (ref 31.5–35.7)
MCV RBC AUTO: 87.6 FL (ref 79–97)
METAMYELOCYTES NFR BLD MANUAL: 1 % (ref 0–0)
MONOCYTES # BLD: 0.4 10*3/MM3 (ref 0.1–0.9)
MYELOCYTES NFR BLD MANUAL: 6.2 % (ref 0–0)
NEUTROPHILS # BLD AUTO: 16.89 10*3/MM3 (ref 1.7–7)
NEUTROPHILS NFR BLD MANUAL: 87.6 % (ref 42.7–76)
NRBC BLD AUTO-RTO: 0 /100 WBC (ref 0–0.2)
PHOSPHATE SERPL-MCNC: 4.5 MG/DL (ref 2.5–4.5)
PLAT MORPH BLD: NORMAL
PLATELET # BLD AUTO: 541 10*3/MM3 (ref 140–450)
PMV BLD AUTO: 8.9 FL (ref 6–12)
POTASSIUM SERPL-SCNC: 3.7 MMOL/L (ref 3.5–5.2)
POTASSIUM SERPL-SCNC: 4.4 MMOL/L (ref 3.5–5.2)
RBC # BLD AUTO: 4.02 10*6/MM3 (ref 3.77–5.28)
RBC MORPH BLD: NORMAL
SODIUM SERPL-SCNC: 129 MMOL/L (ref 136–145)
SODIUM SERPL-SCNC: 133 MMOL/L (ref 136–145)
VARIANT LYMPHS NFR BLD MANUAL: 3.1 % (ref 19.6–45.3)
WBC MORPH BLD: NORMAL
WBC NRBC COR # BLD AUTO: 19.28 10*3/MM3 (ref 3.4–10.8)

## 2024-06-08 PROCEDURE — 80048 BASIC METABOLIC PNL TOTAL CA: CPT | Performed by: INTERNAL MEDICINE

## 2024-06-08 PROCEDURE — 83735 ASSAY OF MAGNESIUM: CPT | Performed by: HOSPITALIST

## 2024-06-08 PROCEDURE — 94799 UNLISTED PULMONARY SVC/PX: CPT

## 2024-06-08 PROCEDURE — 63710000001 INSULIN GLARGINE PER 5 UNITS: Performed by: INTERNAL MEDICINE

## 2024-06-08 PROCEDURE — 94760 N-INVAS EAR/PLS OXIMETRY 1: CPT

## 2024-06-08 PROCEDURE — 80048 BASIC METABOLIC PNL TOTAL CA: CPT | Performed by: HOSPITALIST

## 2024-06-08 PROCEDURE — 85007 BL SMEAR W/DIFF WBC COUNT: CPT | Performed by: HOSPITALIST

## 2024-06-08 PROCEDURE — 94664 DEMO&/EVAL PT USE INHALER: CPT

## 2024-06-08 PROCEDURE — 85025 COMPLETE CBC W/AUTO DIFF WBC: CPT | Performed by: HOSPITALIST

## 2024-06-08 PROCEDURE — 94761 N-INVAS EAR/PLS OXIMETRY MLT: CPT

## 2024-06-08 PROCEDURE — 63710000001 INSULIN LISPRO (HUMAN) PER 5 UNITS: Performed by: HOSPITALIST

## 2024-06-08 PROCEDURE — 25010000002 METHYLPREDNISOLONE PER 125 MG: Performed by: HOSPITALIST

## 2024-06-08 PROCEDURE — 25010000002 HEPARIN (PORCINE) PER 1000 UNITS: Performed by: INTERNAL MEDICINE

## 2024-06-08 PROCEDURE — 25010000002 HEPARIN (PORCINE) PER 1000 UNITS: Performed by: HOSPITALIST

## 2024-06-08 PROCEDURE — 25010000002 METHYLPREDNISOLONE PER 40 MG: Performed by: INTERNAL MEDICINE

## 2024-06-08 PROCEDURE — 25010000002 PIPERACILLIN SOD-TAZOBACTAM PER 1 G: Performed by: INTERNAL MEDICINE

## 2024-06-08 PROCEDURE — 82948 REAGENT STRIP/BLOOD GLUCOSE: CPT

## 2024-06-08 PROCEDURE — 63710000001 INSULIN LISPRO (HUMAN) PER 5 UNITS: Performed by: INTERNAL MEDICINE

## 2024-06-08 PROCEDURE — 84100 ASSAY OF PHOSPHORUS: CPT | Performed by: HOSPITALIST

## 2024-06-08 PROCEDURE — 63710000001 INSULIN LISPRO (HUMAN) PER 5 UNITS: Performed by: NURSE PRACTITIONER

## 2024-06-08 PROCEDURE — 25010000002 FUROSEMIDE PER 20 MG: Performed by: INTERNAL MEDICINE

## 2024-06-08 RX ORDER — INSULIN LISPRO 100 [IU]/ML
24 INJECTION, SOLUTION INTRAVENOUS; SUBCUTANEOUS ONCE
Status: COMPLETED | OUTPATIENT
Start: 2024-06-08 | End: 2024-06-08

## 2024-06-08 RX ORDER — METHYLPREDNISOLONE SODIUM SUCCINATE 40 MG/ML
40 INJECTION, POWDER, LYOPHILIZED, FOR SOLUTION INTRAMUSCULAR; INTRAVENOUS EVERY 12 HOURS
Status: DISCONTINUED | OUTPATIENT
Start: 2024-06-08 | End: 2024-06-09

## 2024-06-08 RX ADMIN — GUAIFENESIN 1200 MG: 600 TABLET, EXTENDED RELEASE ORAL at 08:52

## 2024-06-08 RX ADMIN — INSULIN LISPRO 24 UNITS: 100 INJECTION, SOLUTION INTRAVENOUS; SUBCUTANEOUS at 21:22

## 2024-06-08 RX ADMIN — Medication 10 ML: at 21:23

## 2024-06-08 RX ADMIN — FUROSEMIDE 60 MG: 10 INJECTION, SOLUTION INTRAMUSCULAR; INTRAVENOUS at 14:36

## 2024-06-08 RX ADMIN — PIPERACILLIN AND TAZOBACTAM 3.38 G: 3; .375 INJECTION, POWDER, FOR SOLUTION INTRAVENOUS at 10:08

## 2024-06-08 RX ADMIN — INSULIN LISPRO 24 UNITS: 100 INJECTION, SOLUTION INTRAVENOUS; SUBCUTANEOUS at 16:34

## 2024-06-08 RX ADMIN — FLUTICASONE PROPIONATE 2 SPRAY: 50 SPRAY, METERED NASAL at 08:52

## 2024-06-08 RX ADMIN — INSULIN LISPRO 20 UNITS: 100 INJECTION, SOLUTION INTRAVENOUS; SUBCUTANEOUS at 12:42

## 2024-06-08 RX ADMIN — METHYLPREDNISOLONE SODIUM SUCCINATE 40 MG: 40 INJECTION, POWDER, FOR SOLUTION INTRAMUSCULAR; INTRAVENOUS at 22:22

## 2024-06-08 RX ADMIN — PIPERACILLIN AND TAZOBACTAM 3.38 G: 3; .375 INJECTION, POWDER, FOR SOLUTION INTRAVENOUS at 17:36

## 2024-06-08 RX ADMIN — IPRATROPIUM BROMIDE AND ALBUTEROL SULFATE 3 ML: .5; 3 SOLUTION RESPIRATORY (INHALATION) at 14:46

## 2024-06-08 RX ADMIN — INSULIN GLARGINE 20 UNITS: 100 INJECTION, SOLUTION SUBCUTANEOUS at 21:22

## 2024-06-08 RX ADMIN — INSULIN GLARGINE 10 UNITS: 100 INJECTION, SOLUTION SUBCUTANEOUS at 15:04

## 2024-06-08 RX ADMIN — IPRATROPIUM BROMIDE AND ALBUTEROL SULFATE 3 ML: .5; 3 SOLUTION RESPIRATORY (INHALATION) at 07:27

## 2024-06-08 RX ADMIN — SERTRALINE 100 MG: 100 TABLET, FILM COATED ORAL at 08:52

## 2024-06-08 RX ADMIN — IPRATROPIUM BROMIDE AND ALBUTEROL SULFATE 3 ML: .5; 3 SOLUTION RESPIRATORY (INHALATION) at 19:54

## 2024-06-08 RX ADMIN — Medication 10 ML: at 18:48

## 2024-06-08 RX ADMIN — HEPARIN SODIUM 5000 UNITS: 5000 INJECTION INTRAVENOUS; SUBCUTANEOUS at 05:44

## 2024-06-08 RX ADMIN — HEPARIN SODIUM 5000 UNITS: 5000 INJECTION INTRAVENOUS; SUBCUTANEOUS at 21:22

## 2024-06-08 RX ADMIN — HEPARIN SODIUM 5000 UNITS: 5000 INJECTION INTRAVENOUS; SUBCUTANEOUS at 14:36

## 2024-06-08 RX ADMIN — INSULIN LISPRO 20 UNITS: 100 INJECTION, SOLUTION INTRAVENOUS; SUBCUTANEOUS at 08:03

## 2024-06-08 RX ADMIN — METHYLPREDNISOLONE SODIUM SUCCINATE 60 MG: 125 INJECTION INTRAMUSCULAR; INTRAVENOUS at 10:08

## 2024-06-08 RX ADMIN — FUROSEMIDE 60 MG: 10 INJECTION, SOLUTION INTRAMUSCULAR; INTRAVENOUS at 02:52

## 2024-06-08 RX ADMIN — DEXTROMETHORPHAN POLISTIREX 60 MG: 30 SUSPENSION ORAL at 21:23

## 2024-06-08 RX ADMIN — INSULIN LISPRO 24 UNITS: 100 INJECTION, SOLUTION INTRAVENOUS; SUBCUTANEOUS at 22:31

## 2024-06-08 RX ADMIN — ASPIRIN 81 MG: 81 TABLET, CHEWABLE ORAL at 08:52

## 2024-06-08 RX ADMIN — GUAIFENESIN 1200 MG: 600 TABLET, EXTENDED RELEASE ORAL at 21:22

## 2024-06-08 RX ADMIN — PIPERACILLIN AND TAZOBACTAM 3.38 G: 3; .375 INJECTION, POWDER, FOR SOLUTION INTRAVENOUS at 02:52

## 2024-06-08 NOTE — PROGRESS NOTES
"  PROGRESS NOTE  Patient Name: Yuliana Moeller  Age/Sex: 58 y.o. female  : 1965  MRN: [id number]    Date of Admission: 6/3/2024  Date of Encounter Visit: 24   LOS: 3 days   Patient Care Team:  Daja Fink)JOSEPHINE as PCP - General (Family Medicine)    Chief Complaint: Pneumonia with respiratory failure    Hospital course: Patient came in with respiratory failure with acute hypoxemia and high oxygen requirements with consolidation on chest imaging.  Echocardiogram was normal.  Patient was started on broad-spectrum antibiotic currently is on Zosyn.  DVT prophylaxis with heparin, patient is on systemic steroids with 60 mg every 12 of Solu-Medrol.  Underlying comorbidities including diabetes and obesity with a BMI of 37.  Workup in progress for any possible underlying autoimmune process.  Cultures are negative to date.  Patient is in ICU for requirement for BiPAP/Optiflow to maintain good oxygenation        REVIEW OF SYSTEMS:   CONSTITUTIONAL: no fever or chills  CARDIOVASCULAR: No chest pain, chest pressure or chest discomfort. No palpitations or edema.   RESPIRATORY: No shortness of breath, cough or sputum.   GASTROINTESTINAL: No anorexia, nausea, vomiting or diarrhea. No abdominal pain or blood.   HEMATOLOGIC: No bleeding or bruising.     Ventilator/Non-Invasive Ventilation Settings (From admission, onward)      None              Vital Signs  Temp:  [97.4 °F (36.3 °C)-97.7 °F (36.5 °C)] 97.5 °F (36.4 °C)  Heart Rate:  [] 81  Resp:  [18-22] 22  BP: (116-171)/(66-95) 138/84  SpO2:  [89 %-98 %] 95 %  on  Flow (L/min):  [7-12] 8 Device (Oxygen Therapy): high-flow nasal cannula;humidified    Intake/Output Summary (Last 24 hours) at 2024 1441  Last data filed at 2024 1100  Gross per 24 hour   Intake 794.64 ml   Output 250 ml   Net 544.64 ml     Flowsheet Rows      Flowsheet Row First Filed Value   Admission Height 149.9 cm (59\") Documented at 2024 1501   Admission Weight 81.6 kg " (180 lb) Documented at 06/03/2024 1501          Body mass index is 36.94 kg/m².      06/03/24  1501 06/04/24  0055   Weight: 81.6 kg (180 lb) 83 kg (182 lb 14.4 oz)       Physical Exam:  GEN:  No acute distress, alert, cooperative, well developed, on nasal cannula oxygen  EYES:   Sclerae clear. No icterus. PERRL. Normal EOM  ENT:   External ears/nose normal, no oral lesions, no thrush, mucous membranes moist  NECK:  Supple, midline trachea, no JVD  LUNGS: Normal chest on inspection, diminished, minimal audible crackles despite the finding on the chest x-ray and the CT scan . Respirations regular, even and unlabored.   CV:  Regular rhythm and rate. Normal S1/S2. No murmurs, gallops, or rubs noted.  ABD:  Soft, nontender and nondistended. Normal bowel sounds. No guarding  EXT:  Moves all extremities well. No cyanosis. No redness. No edema.   Skin: Dry, intact, no bleeding    Results Review:    Results From Last 14 Days   Lab Units 06/05/24  1018 06/03/24  1759   LACTATE mmol/L 1.4 1.1     Results from last 7 days   Lab Units 06/08/24  0318 06/07/24  1833 06/07/24  0642 06/06/24  1742 06/06/24  0415 06/05/24  2302 06/05/24  1813 06/05/24  1035 06/05/24  0424 06/04/24  1202 06/04/24  0425 06/03/24  1759   SODIUM mmol/L 133* 133* 137 132* 132* 130*  130* 129*   < > 127*   < > 125* 123*   POTASSIUM mmol/L 4.4 4.1 4.1 3.1* 3.7 3.7  3.7 4.1   < > 3.9   < > 3.4* 3.0*   CHLORIDE mmol/L 94* 93* 98 91* 93* 93*  93* 91*   < > 95*   < > 92* 93*   CO2 mmol/L 25.3 25.0 27.5 26.8 26.0 25.0  25.0 28.0   < > 21.0*   < > 24.0 20.0*   BUN mg/dL 14 11 9 7 9 8  8 7   < > 8   < > 12 12   CREATININE mg/dL 0.53* 0.62 0.51* 0.49* 0.60 0.54*  0.54* 0.64   < > 0.48*   < > 0.61 0.58   CALCIUM mg/dL 9.2 9.0 8.5* 8.7 8.3* 8.4*  8.4* 8.4*   < > 7.9*   < > 8.0* 7.9*   AST (SGOT) U/L  --   --  277*  --   --  141*  --   --  99*  --  74* 80*   ALT (SGPT) U/L  --   --  217*  --   --  118*  --   --  82*  --  62* 61*   ANION GAP mmol/L 13.7 15.0  "11.5 14.2 13.0 12.0  12.0 10.0   < > 11.0   < > 9.0 10.0   ALBUMIN g/dL  --   --  2.9*  --   --  3.1*  --   --  3.0*  --  3.1* 3.1*    < > = values in this interval not displayed.         Results from last 7 days   Lab Units 06/04/24  1202   TSH uIU/mL 3.710     Results from last 7 days   Lab Units 06/05/24  1035   PROBNP pg/mL 2,095.0*     Results from last 7 days   Lab Units 06/08/24 0318 06/07/24  0028 06/06/24  0415 06/05/24  0610 06/05/24 0424 06/04/24 0425 06/03/24  1738   WBC 10*3/mm3 19.28* 16.38* 18.42*  --  16.25* 12.87* 14.26*   HEMOGLOBIN g/dL 11.4* 9.9* 10.3*  --  10.0* 10.2* 11.8*   HEMOGLOBIN, POC g/dL  --   --   --  10.7*  --   --   --    HEMATOCRIT % 35.2 29.4* 29.9*  --  29.5* 30.4* 35.2   HEMATOCRIT POC %  --   --   --  31*  --   --   --    PLATELETS 10*3/mm3 541* 398 370  --  324 277 263   MCV fL 87.6 84.5 84.5  --  85.8 86.4 85.6   NEUTROPHIL % %  --   --   --   --   --  80.2* 83.9*   LYMPHOCYTE % %  --   --   --   --   --  7.6* 5.9*   MONOCYTES % %  --   --   --   --   --  8.0 7.8   EOSINOPHIL % %  --   --   --   --   --  0.3 0.1*   BASOPHIL % %  --   --   --   --   --  0.3 0.2   IMM GRAN % %  --   --   --   --   --  3.6* 2.1*         Results from last 7 days   Lab Units 06/08/24 0318 06/07/24 0028 06/04/24 0425 06/03/24  1759   MAGNESIUM mg/dL 2.2 2.0 2.1 1.9           Invalid input(s): \"LDLCALC\"  Results from last 7 days   Lab Units 06/05/24  0610   PH, ARTERIAL pH units 7.431   PCO2, ARTERIAL mm Hg 31.6*   PO2 ART mm Hg 65.9*   HCO3 ART mmol/L 21.0*         Glucose   Date/Time Value Ref Range Status   06/08/2024 1228 362 (H) 70 - 130 mg/dL Final   06/08/2024 0737 359 (H) 70 - 130 mg/dL Final   06/07/2024 2016 259 (H) 70 - 130 mg/dL Final   06/07/2024 1616 378 (H) 70 - 130 mg/dL Final   06/07/2024 1142 296 (H) 70 - 130 mg/dL Final   06/07/2024 0808 143 (H) 70 - 130 mg/dL Final   06/06/2024 2002 170 (H) 70 - 130 mg/dL Final   06/06/2024 1620 301 (H) 70 - 130 mg/dL Final     Results from " last 7 days   Lab Units 06/05/24  2302 06/05/24  1018 06/04/24  0425 06/03/24  1759   PROCALCITONIN ng/mL 0.23  --  0.24  --    LACTATE mmol/L  --  1.4  --  1.1     Results from last 7 days   Lab Units 06/04/24  0154 06/03/24  1738   BLOODCX   --  No growth at 4 days  No growth at 4 days   STREP PNEUMO AG  Negative  --    L. PNEUMOPHILA SEROGP 1 UR AG  Negative  --          Results from last 7 days   Lab Units 06/04/24  1629 06/04/24  0156   COVID19   --  Not Detected   ADENOVIRUS DETECTION BY PCR   --  Not Detected   ADENOVIRUS  Not Detected  --    CORONAVIRUS 229E   --  Not Detected   CORONAVIRUS HKU1   --  Not Detected   CORONAVIRUS NL63   --  Not Detected   CORONAVIRUS OC43   --  Not Detected   HUMAN METAPNEUMOVIRUS   --  Not Detected   HUMAN RHINOVIRUS/ENTEROVIRUS   --  Not Detected   INFLUENZA B PCR   --  Not Detected   PARAINFLUENZA 1   --  Not Detected   PARAINFLUENZA VIRUS 2   --  Not Detected   PARAINFLUENZA VIRUS 3   --  Not Detected   PARAINFLUENZA VIRUS 4   --  Not Detected   BORDETELLA PERTUSSIS PCR   --  Not Detected   BORDETELLA PARAPERTUSSIS PCR   --  Not Detected   CHLAMYDOPHILA PNEUMONIAE PCR   --  Not Detected   MYCOPLAMA PNEUMO PCR   --  Not Detected   RSV, PCR   --  Not Detected     Results from last 7 days   Lab Units 06/04/24  1202 06/04/24  1158   SODIUM UR mmol/L  --  <20   OSMOLALITY UR mOsm/kg  --  539   URIC ACID mg/dL 3.5  --            Imaging:   Imaging Results (All)                 I reviewed the patient's new clinical results.  I personally viewed and interpreted the patient's imaging results:        Medication Review:   aspirin, 81 mg, Oral, Daily  dextromethorphan polistirex ER, 60 mg, Oral, Q12H  fluticasone, 2 spray, Each Nare, Daily  furosemide, 60 mg, Intravenous, Q12H  guaiFENesin, 1,200 mg, Oral, Q12H  heparin (porcine), 5,000 Units, Subcutaneous, Q8H  insulin glargine, 20 Units, Subcutaneous, Nightly  insulin lispro, 4-24 Units, Subcutaneous, 4x Daily AC & at  Bedtime  ipratropium-albuterol, 3 mL, Nebulization, Q6H While Awake - RT  methylPREDNISolone sodium succinate, 60 mg, Intravenous, Q12H  piperacillin-tazobactam, 3.375 g, Intravenous, Q8H  sertraline, 100 mg, Oral, Daily  sodium chloride, 10 mL, Intravenous, Q12H             ASSESSMENT:   Acute hypoxic respiratory failure  Bilateral pulmonary infiltrates  Bilateral consolidations, left greater than right  Suspected fluid overload  Obesity  Type 2 diabetes mellitus  Steroid-induced hyperglycemia  Mild hyponatremia, of no clinical significance    PLAN:  Patient is doing better, she is on high flow nasal cannula oxygen at 8 L and we should be able to wean it even more  Has not required the BiPAP or the Optiflow and is unlikely to require it and she is good to transfer out  She is not critical at this point  Patient received 2 dose of Rocephin on 6/3/2024 and 6/4/2023 and later was transitioned to Zosyn on 6/5/2026   She is on the steroid and we will start weaning the dose down and probably transition to p.o. prednisone tomorrow and will consider stepping down on the antibiotic tomorrow as well  Continue to wean oxygen as tolerated  Discontinue BiPAP and Optiflow  Patient is no longer critical        Discussed with patient and with the RT  Labs/Notes/films were independently reviewed and pertinent results are summarized above  The copied texts in this note were reviewed and they are accurate as of 06/08/24    Disposition: Probably home in 2 to 3 days    Nidhi Lacy MD  06/08/24  14:41 EDT        Dictated utilizing Dragon dictation

## 2024-06-08 NOTE — PLAN OF CARE
Goal Outcome Evaluation:                      A/Ox4, VSS, required more oxygen throughout the night, tachypneic/shallow breathing at times, a bit anxiety driven, pt able to recover sats with coaching and relaxation techniques but continued to drop sats btwn 87-89% on 6 L hi-ricardo, pt currently on 12L hi-ricardo and able to relax and rest more comfortably. See AM labs

## 2024-06-09 ENCOUNTER — APPOINTMENT (OUTPATIENT)
Dept: GENERAL RADIOLOGY | Facility: HOSPITAL | Age: 59
End: 2024-06-09
Payer: COMMERCIAL

## 2024-06-09 LAB
ANION GAP SERPL CALCULATED.3IONS-SCNC: 12 MMOL/L (ref 5–15)
BASOPHILS # BLD MANUAL: 0 10*3/MM3 (ref 0–0.2)
BASOPHILS NFR BLD MANUAL: 0 % (ref 0–1.5)
BUN SERPL-MCNC: 22 MG/DL (ref 6–20)
BUN/CREAT SERPL: 32.8 (ref 7–25)
CALCIUM SPEC-SCNC: 9.1 MG/DL (ref 8.6–10.5)
CHLORIDE SERPL-SCNC: 93 MMOL/L (ref 98–107)
CO2 SERPL-SCNC: 31 MMOL/L (ref 22–29)
CREAT SERPL-MCNC: 0.67 MG/DL (ref 0.57–1)
DEPRECATED RDW RBC AUTO: 43.2 FL (ref 37–54)
EGFRCR SERPLBLD CKD-EPI 2021: 102.1 ML/MIN/1.73
EOSINOPHIL # BLD MANUAL: 0 10*3/MM3 (ref 0–0.4)
EOSINOPHIL NFR BLD MANUAL: 0 % (ref 0.3–6.2)
ERYTHROCYTE [DISTWIDTH] IN BLOOD BY AUTOMATED COUNT: 13.5 % (ref 12.3–15.4)
GLUCOSE BLDC GLUCOMTR-MCNC: 145 MG/DL (ref 70–130)
GLUCOSE BLDC GLUCOMTR-MCNC: 230 MG/DL (ref 70–130)
GLUCOSE BLDC GLUCOMTR-MCNC: 244 MG/DL (ref 70–130)
GLUCOSE BLDC GLUCOMTR-MCNC: 321 MG/DL (ref 70–130)
GLUCOSE BLDC GLUCOMTR-MCNC: 335 MG/DL (ref 70–130)
GLUCOSE BLDC GLUCOMTR-MCNC: 345 MG/DL (ref 70–130)
GLUCOSE SERPL-MCNC: 115 MG/DL (ref 65–99)
HCT VFR BLD AUTO: 33.7 % (ref 34–46.6)
HGB BLD-MCNC: 11.1 G/DL (ref 12–15.9)
LYMPHOCYTES # BLD MANUAL: 2.04 10*3/MM3 (ref 0.7–3.1)
LYMPHOCYTES NFR BLD MANUAL: 5 % (ref 5–12)
MAGNESIUM SERPL-MCNC: 2.7 MG/DL (ref 1.6–2.6)
MCH RBC QN AUTO: 28.7 PG (ref 26.6–33)
MCHC RBC AUTO-ENTMCNC: 32.9 G/DL (ref 31.5–35.7)
MCV RBC AUTO: 87.1 FL (ref 79–97)
METAMYELOCYTES NFR BLD MANUAL: 6 % (ref 0–0)
MONOCYTES # BLD: 1.28 10*3/MM3 (ref 0.1–0.9)
NEUTROPHILS # BLD AUTO: 20.7 10*3/MM3 (ref 1.7–7)
NEUTROPHILS NFR BLD MANUAL: 81 % (ref 42.7–76)
PHOSPHATE SERPL-MCNC: 5.2 MG/DL (ref 2.5–4.5)
PLAT MORPH BLD: NORMAL
PLATELET # BLD AUTO: 655 10*3/MM3 (ref 140–450)
PMV BLD AUTO: 8.1 FL (ref 6–12)
POTASSIUM SERPL-SCNC: 3.8 MMOL/L (ref 3.5–5.2)
RBC # BLD AUTO: 3.87 10*6/MM3 (ref 3.77–5.28)
RBC MORPH BLD: NORMAL
SODIUM SERPL-SCNC: 136 MMOL/L (ref 136–145)
VARIANT LYMPHS NFR BLD MANUAL: 8 % (ref 19.6–45.3)
WBC MORPH BLD: NORMAL
WBC NRBC COR # BLD AUTO: 25.56 10*3/MM3 (ref 3.4–10.8)

## 2024-06-09 PROCEDURE — 63710000001 INSULIN LISPRO (HUMAN) PER 5 UNITS: Performed by: INTERNAL MEDICINE

## 2024-06-09 PROCEDURE — 83735 ASSAY OF MAGNESIUM: CPT | Performed by: INTERNAL MEDICINE

## 2024-06-09 PROCEDURE — 85007 BL SMEAR W/DIFF WBC COUNT: CPT | Performed by: INTERNAL MEDICINE

## 2024-06-09 PROCEDURE — 84100 ASSAY OF PHOSPHORUS: CPT | Performed by: INTERNAL MEDICINE

## 2024-06-09 PROCEDURE — 25010000002 FUROSEMIDE PER 20 MG: Performed by: INTERNAL MEDICINE

## 2024-06-09 PROCEDURE — 94799 UNLISTED PULMONARY SVC/PX: CPT

## 2024-06-09 PROCEDURE — 71045 X-RAY EXAM CHEST 1 VIEW: CPT

## 2024-06-09 PROCEDURE — 25010000002 PIPERACILLIN SOD-TAZOBACTAM PER 1 G: Performed by: INTERNAL MEDICINE

## 2024-06-09 PROCEDURE — 82948 REAGENT STRIP/BLOOD GLUCOSE: CPT

## 2024-06-09 PROCEDURE — 25010000002 METHYLPREDNISOLONE PER 40 MG: Performed by: INTERNAL MEDICINE

## 2024-06-09 PROCEDURE — 94664 DEMO&/EVAL PT USE INHALER: CPT

## 2024-06-09 PROCEDURE — 94761 N-INVAS EAR/PLS OXIMETRY MLT: CPT

## 2024-06-09 PROCEDURE — 80048 BASIC METABOLIC PNL TOTAL CA: CPT | Performed by: INTERNAL MEDICINE

## 2024-06-09 PROCEDURE — 25010000002 HEPARIN (PORCINE) PER 1000 UNITS: Performed by: INTERNAL MEDICINE

## 2024-06-09 PROCEDURE — 63710000001 INSULIN GLARGINE PER 5 UNITS: Performed by: HOSPITALIST

## 2024-06-09 PROCEDURE — 85025 COMPLETE CBC W/AUTO DIFF WBC: CPT | Performed by: INTERNAL MEDICINE

## 2024-06-09 RX ORDER — PREDNISONE 20 MG/1
40 TABLET ORAL
Status: DISCONTINUED | OUTPATIENT
Start: 2024-06-10 | End: 2024-06-11 | Stop reason: HOSPADM

## 2024-06-09 RX ORDER — AMOXICILLIN AND CLAVULANATE POTASSIUM 875; 125 MG/1; MG/1
1 TABLET, FILM COATED ORAL EVERY 12 HOURS SCHEDULED
Status: DISCONTINUED | OUTPATIENT
Start: 2024-06-09 | End: 2024-06-11 | Stop reason: HOSPADM

## 2024-06-09 RX ADMIN — FUROSEMIDE 60 MG: 10 INJECTION, SOLUTION INTRAMUSCULAR; INTRAVENOUS at 15:28

## 2024-06-09 RX ADMIN — HEPARIN SODIUM 5000 UNITS: 5000 INJECTION INTRAVENOUS; SUBCUTANEOUS at 06:09

## 2024-06-09 RX ADMIN — SERTRALINE 100 MG: 100 TABLET, FILM COATED ORAL at 09:06

## 2024-06-09 RX ADMIN — INSULIN LISPRO 8 UNITS: 100 INJECTION, SOLUTION INTRAVENOUS; SUBCUTANEOUS at 09:02

## 2024-06-09 RX ADMIN — FUROSEMIDE 60 MG: 10 INJECTION, SOLUTION INTRAMUSCULAR; INTRAVENOUS at 01:49

## 2024-06-09 RX ADMIN — HEPARIN SODIUM 5000 UNITS: 5000 INJECTION INTRAVENOUS; SUBCUTANEOUS at 21:24

## 2024-06-09 RX ADMIN — GUAIFENESIN 1200 MG: 600 TABLET, EXTENDED RELEASE ORAL at 21:24

## 2024-06-09 RX ADMIN — AMOXICILLIN AND CLAVULANATE POTASSIUM 1 TABLET: 875; 125 TABLET, FILM COATED ORAL at 21:24

## 2024-06-09 RX ADMIN — DEXTROMETHORPHAN POLISTIREX 60 MG: 30 SUSPENSION ORAL at 21:24

## 2024-06-09 RX ADMIN — DEXTROMETHORPHAN POLISTIREX 60 MG: 30 SUSPENSION ORAL at 09:06

## 2024-06-09 RX ADMIN — INSULIN GLARGINE 25 UNITS: 100 INJECTION, SOLUTION SUBCUTANEOUS at 21:24

## 2024-06-09 RX ADMIN — GUAIFENESIN 1200 MG: 600 TABLET, EXTENDED RELEASE ORAL at 09:06

## 2024-06-09 RX ADMIN — IPRATROPIUM BROMIDE AND ALBUTEROL SULFATE 3 ML: .5; 3 SOLUTION RESPIRATORY (INHALATION) at 20:46

## 2024-06-09 RX ADMIN — INSULIN LISPRO 16 UNITS: 100 INJECTION, SOLUTION INTRAVENOUS; SUBCUTANEOUS at 12:28

## 2024-06-09 RX ADMIN — IPRATROPIUM BROMIDE AND ALBUTEROL SULFATE 3 ML: .5; 3 SOLUTION RESPIRATORY (INHALATION) at 11:20

## 2024-06-09 RX ADMIN — Medication 10 ML: at 09:06

## 2024-06-09 RX ADMIN — INSULIN LISPRO 16 UNITS: 100 INJECTION, SOLUTION INTRAVENOUS; SUBCUTANEOUS at 16:55

## 2024-06-09 RX ADMIN — Medication 10 ML: at 21:25

## 2024-06-09 RX ADMIN — METHYLPREDNISOLONE SODIUM SUCCINATE 40 MG: 40 INJECTION, POWDER, FOR SOLUTION INTRAMUSCULAR; INTRAVENOUS at 11:07

## 2024-06-09 RX ADMIN — FLUTICASONE PROPIONATE 2 SPRAY: 50 SPRAY, METERED NASAL at 09:06

## 2024-06-09 RX ADMIN — PIPERACILLIN AND TAZOBACTAM 3.38 G: 3; .375 INJECTION, POWDER, FOR SOLUTION INTRAVENOUS at 01:50

## 2024-06-09 RX ADMIN — ASPIRIN 81 MG: 81 TABLET, CHEWABLE ORAL at 09:06

## 2024-06-09 RX ADMIN — HEPARIN SODIUM 5000 UNITS: 5000 INJECTION INTRAVENOUS; SUBCUTANEOUS at 15:28

## 2024-06-09 RX ADMIN — INSULIN LISPRO 16 UNITS: 100 INJECTION, SOLUTION INTRAVENOUS; SUBCUTANEOUS at 21:24

## 2024-06-09 RX ADMIN — PIPERACILLIN AND TAZOBACTAM 3.38 G: 3; .375 INJECTION, POWDER, FOR SOLUTION INTRAVENOUS at 09:05

## 2024-06-09 RX ADMIN — IPRATROPIUM BROMIDE AND ALBUTEROL SULFATE 3 ML: .5; 3 SOLUTION RESPIRATORY (INHALATION) at 06:35

## 2024-06-09 NOTE — PROGRESS NOTES
"DAILY PROGRESS NOTE  [facility]    Patient Identification:  Name: Yuliana Moeller  Age: 58 y.o.  Sex: female  :  1965  MRN: [id number]         Primary Care Physician: Daja Fink (Geeta), JOSEPHINE      Subjective  No new or acute complaints.  Overall feeling much better.  Breathing much easier.  Notes no sputum production.    Objective:  General Appearance:  Comfortable, well-appearing, in no acute distress and not in pain.    Vital signs: (most recent): Blood pressure 128/87, pulse 106, temperature 98.4 °F (36.9 °C), temperature source Oral, resp. rate 20, height 149.9 cm (59\"), weight 83 kg (182 lb 14.4 oz), SpO2 92%.    Lungs:  Normal effort and normal respiratory rate.  She is not in respiratory distress.  No stridor.  There are rales.  No decreased breath sounds, wheezes or rhonchi.  (Few rales left base.)  Heart: Normal rate.  Regular rhythm.    Extremities: There is no dependent edema.    Neurological: Patient is alert and oriented to person, place and time.    Skin:  Warm and dry.                  Vital signs in last 24 hours:  Temp:  [97.9 °F (36.6 °C)-98.4 °F (36.9 °C)] 98.4 °F (36.9 °C)  Heart Rate:  [] 106  Resp:  [20-22] 20  BP: (113-144)/(75-90) 128/87    Intake/Output:    Intake/Output Summary (Last 24 hours) at 2024 1340  Last data filed at 2024 1338  Gross per 24 hour   Intake 1250 ml   Output 1875 ml   Net -625 ml         Results from last 7 days   Lab Units 24  0517 24  0318 24  0028 24  0415 24  0610 24  0424 24  0425 24  1738   WBC 10*3/mm3 25.56* 19.28* 16.38* 18.42*  --  16.25* 12.87* 14.26*   HEMOGLOBIN g/dL 11.1* 11.4* 9.9* 10.3*  --  10.0* 10.2* 11.8*   HEMOGLOBIN, POC g/dL  --   --   --   --  10.7*  --   --   --    PLATELETS 10*3/mm3 655* 541* 398 370  --  862 613 506     Results from last 7 days   Lab Units 24  0517 24  1736 24  0318 24  1833 24  0642 24  1742 " 06/06/24 0415   SODIUM mmol/L 136 129* 133* 133* 137 132* 132*   POTASSIUM mmol/L 3.8 3.7 4.4 4.1 4.1 3.1* 3.7   CHLORIDE mmol/L 93* 88* 94* 93* 98 91* 93*   CO2 mmol/L 31.0* 29.0 25.3 25.0 27.5 26.8 26.0   BUN mg/dL 22* 20 14 11 9 7 9   CREATININE mg/dL 0.67 0.70 0.53* 0.62 0.51* 0.49* 0.60   GLUCOSE mg/dL 115* 547* 297* 352* 124* 269* 190*   Estimated Creatinine Clearance: 91.4 mL/min (by C-G formula based on SCr of 0.67 mg/dL).  Results from last 7 days   Lab Units 06/09/24  0517 06/08/24  1736 06/08/24  0318 06/07/24  1833 06/07/24  0642 06/07/24  0028 06/06/24  1742 06/06/24 0415 06/05/24 2302 06/05/24  1035 06/05/24 0424 06/04/24  1202 06/04/24 0425 06/03/24  1759   CALCIUM mg/dL 9.1 8.8 9.2 9.0 8.5*  --  8.7 8.3* 8.4*  8.4*   < > 7.9*   < > 8.0* 7.9*   ALBUMIN g/dL  --   --   --   --  2.9*  --   --   --  3.1*  --  3.0*  --  3.1* 3.1*   MAGNESIUM mg/dL 2.7*  --  2.2  --   --  2.0  --   --   --   --   --   --  2.1 1.9   PHOSPHORUS mg/dL 5.2*  --  4.5  --   --  3.9  --   --   --   --   --   --   --   --     < > = values in this interval not displayed.     Results from last 7 days   Lab Units 06/07/24 0642 06/05/24  2302 06/05/24  0424 06/04/24  0425 06/03/24  1759   ALBUMIN g/dL 2.9* 3.1* 3.0* 3.1* 3.1*   BILIRUBIN mg/dL 0.4 0.3 0.2 0.3 0.4   ALK PHOS U/L 255* 200* 123* 110 105   AST (SGOT) U/L 277* 141* 99* 74* 80*   ALT (SGPT) U/L 217* 118* 82* 62* 61*       Assessment:  Bilateral pneumonitis/pneumonia: Much improved.  Continue present regime.  Likely switch to oral antibiotics tomorrow.  Possible fluid overload: Probably transition to oral diuretics in next day or 2.    Acute hypoxic respiratory failure: Secondary to above.  Improved.  Wean oxygen as tolerated.  Pulmonary input greatly appreciated.  Diabetes type 2-reported AISHWARYA: Titrate up Lantus.  Continue to monitor with sliding scale insulin.  Hyponatremia: Resolved.  Monitor.  Hypokalemia: Corrected.  Monitor.  Transaminitis: Monitor.  Check  hepatitis viral panel.  Hypertension: Monitor.  Morbid obesity:    All problems new to this examiner.    Plan:  Please see above.  Discussed with patient.    Bobby Sahu MD  6/9/2024  13:40 EDT

## 2024-06-09 NOTE — PROGRESS NOTES
"  PROGRESS NOTE  Patient Name: Yuliana Moeller  Age/Sex: 58 y.o. female  : 1965  MRN: [id number]    Date of Admission: 6/3/2024  Date of Encounter Visit: 24   LOS: 4 days   Patient Care Team:  Daja Fink APRN ([place]) as PCP - General (Family Medicine)    Chief Complaint: Pneumonia with respiratory failure    Hospital course: Patient came in with respiratory failure with acute hypoxemia and high oxygen requirements with consolidation on chest imaging.  Echocardiogram was normal.  Patient was started on broad-spectrum antibiotic currently is on Zosyn.  DVT prophylaxis with heparin, patient is on systemic steroids and was tapered to p.o. prednisone  Oxygen requirement further improved down to 3 L/min      REVIEW OF SYSTEMS:   CONSTITUTIONAL: no fever or chills  CARDIOVASCULAR: No chest pain, chest pressure or chest discomfort. No palpitations or edema.   RESPIRATORY: Improving shortness of breath, minimal productive secretion, no fever   GASTROINTESTINAL: No anorexia, nausea, vomiting or diarrhea. No abdominal pain or blood.   HEMATOLOGIC: No bleeding or bruising.     Ventilator/Non-Invasive Ventilation Settings (From admission, onward)      None              Vital Signs  Temp:  [97.9 °F (36.6 °C)-98.4 °F (36.9 °C)] 98.4 °F (36.9 °C)  Heart Rate:  [] 106  Resp:  [20-22] 20  BP: (113-144)/(75-90) 128/87  SpO2:  [91 %-100 %] 92 %  on  Flow (L/min):  [5-8] 5 Device (Oxygen Therapy): high-flow nasal cannula;humidified    Intake/Output Summary (Last 24 hours) at 2024 1421  Last data filed at 2024 1338  Gross per 24 hour   Intake 1250 ml   Output 1875 ml   Net -625 ml     Flowsheet Rows      Flowsheet Row First Filed Value   Admission Height 149.9 cm (59\") Documented at 2024 1501   Admission Weight 81.6 kg (180 lb) Documented at 2024 1501          Body mass index is 36.94 kg/m².      24  1501 24  0055   Weight: 81.6 kg (180 lb) 83 kg (182 lb 14.4 oz)       Physical " Exam:  GEN:  No acute distress, alert, cooperative, well developed, on nasal cannula oxygen  EYES:   Sclerae clear. No icterus. PERRL. Normal EOM  ENT:   External ears/nose normal, no oral lesions, no thrush, mucous membranes moist  NECK:  Supple, midline trachea, no JVD  LUNGS: Normal chest on inspection, audible crackles but improved breath sounds compared to yesterday.  No wheezes. . Respirations regular, even and unlabored.   CV:  Regular rhythm and rate. Normal S1/S2. No murmurs, gallops, or rubs noted.  ABD:  Soft, nontender and nondistended. Normal bowel sounds. No guarding  EXT:  Moves all extremities well. No cyanosis. No redness. No edema.   Skin: Dry, intact, no bleeding    Results Review:    Results From Last 14 Days   Lab Units 06/05/24  1018 06/03/24  1759   LACTATE mmol/L 1.4 1.1     Results from last 7 days   Lab Units 06/09/24  0517 06/08/24  1736 06/08/24  0318 06/07/24  1833 06/07/24  0642 06/06/24  1742 06/06/24  0415 06/05/24  2302 06/05/24  1035 06/05/24  0424 06/04/24  1202 06/04/24  0425 06/03/24  1759   SODIUM mmol/L 136 129* 133* 133* 137 132* 132* 130*  130*   < > 127*   < > 125* 123*   POTASSIUM mmol/L 3.8 3.7 4.4 4.1 4.1 3.1* 3.7 3.7  3.7   < > 3.9   < > 3.4* 3.0*   CHLORIDE mmol/L 93* 88* 94* 93* 98 91* 93* 93*  93*   < > 95*   < > 92* 93*   CO2 mmol/L 31.0* 29.0 25.3 25.0 27.5 26.8 26.0 25.0  25.0   < > 21.0*   < > 24.0 20.0*   BUN mg/dL 22* 20 14 11 9 7 9 8  8   < > 8   < > 12 12   CREATININE mg/dL 0.67 0.70 0.53* 0.62 0.51* 0.49* 0.60 0.54*  0.54*   < > 0.48*   < > 0.61 0.58   CALCIUM mg/dL 9.1 8.8 9.2 9.0 8.5* 8.7 8.3* 8.4*  8.4*   < > 7.9*   < > 8.0* 7.9*   AST (SGOT) U/L  --   --   --   --  277*  --   --  141*  --  99*  --  74* 80*   ALT (SGPT) U/L  --   --   --   --  217*  --   --  118*  --  82*  --  62* 61*   ANION GAP mmol/L 12.0 12.0 13.7 15.0 11.5 14.2 13.0 12.0  12.0   < > 11.0   < > 9.0 10.0   ALBUMIN g/dL  --   --   --   --  2.9*  --   --  3.1*  --  3.0*  --  3.1*  "3.1*    < > = values in this interval not displayed.         Results from last 7 days   Lab Units 06/04/24  1202   TSH uIU/mL 3.710     Results from last 7 days   Lab Units 06/05/24  1035   PROBNP pg/mL 2,095.0*     Results from last 7 days   Lab Units 06/09/24  0517 06/08/24  0318 06/07/24  0028 06/06/24  0415 06/05/24  0610 06/05/24  0424 06/04/24 0425 06/03/24  1738   WBC 10*3/mm3 25.56* 19.28* 16.38* 18.42*  --  16.25* 12.87* 14.26*   HEMOGLOBIN g/dL 11.1* 11.4* 9.9* 10.3*  --  10.0* 10.2* 11.8*   HEMOGLOBIN, POC g/dL  --   --   --   --  10.7*  --   --   --    HEMATOCRIT % 33.7* 35.2 29.4* 29.9*  --  29.5* 30.4* 35.2   HEMATOCRIT POC %  --   --   --   --  31*  --   --   --    PLATELETS 10*3/mm3 655* 541* 398 370  --  324 277 263   MCV fL 87.1 87.6 84.5 84.5  --  85.8 86.4 85.6   NEUTROPHIL % %  --   --   --   --   --   --  80.2* 83.9*   LYMPHOCYTE % %  --   --   --   --   --   --  7.6* 5.9*   MONOCYTES % %  --   --   --   --   --   --  8.0 7.8   EOSINOPHIL % %  --   --   --   --   --   --  0.3 0.1*   BASOPHIL % %  --   --   --   --   --   --  0.3 0.2   IMM GRAN % %  --   --   --   --   --   --  3.6* 2.1*         Results from last 7 days   Lab Units 06/09/24  0517 06/08/24 0318 06/07/24  0028 06/04/24 0425 06/03/24  1759   MAGNESIUM mg/dL 2.7* 2.2 2.0 2.1 1.9           Invalid input(s): \"LDLCALC\"  Results from last 7 days   Lab Units 06/05/24  0610   PH, ARTERIAL pH units 7.431   PCO2, ARTERIAL mm Hg 31.6*   PO2 ART mm Hg 65.9*   HCO3 ART mmol/L 21.0*         Glucose   Date/Time Value Ref Range Status   06/09/2024 1136 335 (H) 70 - 130 mg/dL Final   06/09/2024 0756 244 (H) 70 - 130 mg/dL Final   06/09/2024 0621 145 (H) 70 - 130 mg/dL Final   06/09/2024 0055 230 (H) 70 - 130 mg/dL Final   06/08/2024 2230 432 (C) 70 - 130 mg/dL Final   06/08/2024 2112 466 (C) 70 - 130 mg/dL Final   06/08/2024 1958 461 (C) 70 - 130 mg/dL Final   06/08/2024 1849 530 (C) 70 - 130 mg/dL Final     Results from last 7 days   Lab Units " 06/05/24  2302 06/05/24  1018 06/04/24  0425 06/03/24  1759   PROCALCITONIN ng/mL 0.23  --  0.24  --    LACTATE mmol/L  --  1.4  --  1.1     Results from last 7 days   Lab Units 06/04/24  0154 06/03/24  1738   BLOODCX   --  No growth at 5 days  No growth at 5 days   STREP PNEUMO AG  Negative  --    L. PNEUMOPHILA SEROGP 1 UR AG  Negative  --          Results from last 7 days   Lab Units 06/04/24  1629 06/04/24  0156   COVID19   --  Not Detected   ADENOVIRUS DETECTION BY PCR   --  Not Detected   ADENOVIRUS  Not Detected  --    CORONAVIRUS 229E   --  Not Detected   CORONAVIRUS HKU1   --  Not Detected   CORONAVIRUS NL63   --  Not Detected   CORONAVIRUS OC43   --  Not Detected   HUMAN METAPNEUMOVIRUS   --  Not Detected   HUMAN RHINOVIRUS/ENTEROVIRUS   --  Not Detected   INFLUENZA B PCR   --  Not Detected   PARAINFLUENZA 1   --  Not Detected   PARAINFLUENZA VIRUS 2   --  Not Detected   PARAINFLUENZA VIRUS 3   --  Not Detected   PARAINFLUENZA VIRUS 4   --  Not Detected   BORDETELLA PERTUSSIS PCR   --  Not Detected   BORDETELLA PARAPERTUSSIS PCR   --  Not Detected   CHLAMYDOPHILA PNEUMONIAE PCR   --  Not Detected   MYCOPLAMA PNEUMO PCR   --  Not Detected   RSV, PCR   --  Not Detected     Results from last 7 days   Lab Units 06/04/24  1202 06/04/24  1158   SODIUM UR mmol/L  --  <20   OSMOLALITY UR mOsm/kg  --  539   URIC ACID mg/dL 3.5  --            Imaging:   Imaging Results (All)                   I reviewed the patient's new clinical results.  I personally viewed and interpreted the patient's imaging results:        Medication Review:   aspirin, 81 mg, Oral, Daily  dextromethorphan polistirex ER, 60 mg, Oral, Q12H  fluticasone, 2 spray, Each Nare, Daily  furosemide, 60 mg, Intravenous, Q12H  guaiFENesin, 1,200 mg, Oral, Q12H  heparin (porcine), 5,000 Units, Subcutaneous, Q8H  insulin glargine, 25 Units, Subcutaneous, Nightly  insulin lispro, 4-24 Units, Subcutaneous, 4x Daily AC & at Bedtime  ipratropium-albuterol, 3  mL, Nebulization, Q6H While Awake - RT  piperacillin-tazobactam, 3.375 g, Intravenous, Q8H  [START ON 6/10/2024] predniSONE, 40 mg, Oral, Daily With Breakfast  sertraline, 100 mg, Oral, Daily  sodium chloride, 10 mL, Intravenous, Q12H             ASSESSMENT:   Acute hypoxic respiratory failure  Bilateral pulmonary infiltrates  Bilateral consolidations, left greater than right  Suspected fluid overload  Obesity  Type 2 diabetes mellitus  Steroid-induced hyperglycemia  Mild hyponatremia, of no clinical significance    PLAN:  Patient is doing much better  Oxygen requirements improved as well as the chest x-ray  She is good to transfer from IV to oral regimen with the antibiotic and we will switch her to Augmentin  Her white count continues to increase, even though she is doing much better clinically, this might be a stress-induced/steroids related leukocytosis expected to eventually improve  Started on Rocephin on 6/3/2024 before she was switched to Zosyn 2 days later, this is day #6 of antibiotic, will give 3 more days of oral  Augmentin  She is to follow-up in few weeks after discharge with another chest x-ray to document resolution  Hopefully home by tomorrow if she continues to improve, may need oxygen on discharge, will arrange tomorrow according        Discussed with patient   Labs/Notes/films were independently reviewed and pertinent results are summarized above  The copied texts in this note were reviewed and they are accurate as of 06/09/24    Disposition: Probably home tomorrow    Nidhi Lacy MD  06/09/24  14:21 EDT        Dictated utilizing Dragon dictation

## 2024-06-09 NOTE — PLAN OF CARE
Goal Outcome Evaluation:   Patient resting in the bed. Complains of SOA at rest on 6L HFNC humidified. No pain. All questions answered.

## 2024-06-09 NOTE — PLAN OF CARE
Goal Outcome Evaluation:  Plan of Care Reviewed With: patient        Progress: no change  Outcome Evaluation: Pt alert and oriented x4. Continue with IV lasix and steroids. WBC elevated. Remains on 6L Hi-flow NC. Continue with fluid restrictions. Safety precautions remain in place. Safety precautions remain in place.

## 2024-06-10 LAB
ALBUMIN SERPL-MCNC: 3.5 G/DL (ref 3.5–5.2)
ALP SERPL-CCNC: 203 U/L (ref 39–117)
ALT SERPL W P-5'-P-CCNC: 177 U/L (ref 1–33)
ANION GAP SERPL CALCULATED.3IONS-SCNC: 10 MMOL/L (ref 5–15)
AST SERPL-CCNC: 105 U/L (ref 1–32)
BASOPHILS # BLD MANUAL: 0 10*3/MM3 (ref 0–0.2)
BASOPHILS NFR BLD MANUAL: 0 % (ref 0–1.5)
BILIRUB CONJ SERPL-MCNC: <0.2 MG/DL (ref 0–0.3)
BILIRUB INDIRECT SERPL-MCNC: ABNORMAL MG/DL
BILIRUB SERPL-MCNC: 0.2 MG/DL (ref 0–1.2)
BUN SERPL-MCNC: 21 MG/DL (ref 6–20)
BUN/CREAT SERPL: 32.3 (ref 7–25)
CALCIUM SPEC-SCNC: 8.9 MG/DL (ref 8.6–10.5)
CENTROMERE B AB SER-ACNC: <0.2 AI (ref 0–0.9)
CHLORIDE SERPL-SCNC: 95 MMOL/L (ref 98–107)
CHROMATIN AB SERPL-ACNC: <0.2 AI (ref 0–0.9)
CO2 SERPL-SCNC: 33 MMOL/L (ref 22–29)
CREAT SERPL-MCNC: 0.65 MG/DL (ref 0.57–1)
DEPRECATED RDW RBC AUTO: 43.6 FL (ref 37–54)
DSDNA AB SER-ACNC: <1 IU/ML (ref 0–9)
EGFRCR SERPLBLD CKD-EPI 2021: 102.8 ML/MIN/1.73
ENA JO1 AB SER-ACNC: <0.2 AI (ref 0–0.9)
ENA RNP AB SER-ACNC: <0.2 AI (ref 0–0.9)
ENA SCL70 AB SER-ACNC: <0.2 AI (ref 0–0.9)
ENA SM AB SER-ACNC: <0.2 AI (ref 0–0.9)
ENA SS-A AB SER-ACNC: <0.2 AI (ref 0–0.9)
ENA SS-B AB SER-ACNC: <0.2 AI (ref 0–0.9)
EOSINOPHIL # BLD MANUAL: 0 10*3/MM3 (ref 0–0.4)
EOSINOPHIL NFR BLD MANUAL: 0 % (ref 0.3–6.2)
ERYTHROCYTE [DISTWIDTH] IN BLOOD BY AUTOMATED COUNT: 13.5 % (ref 12.3–15.4)
GLUCOSE BLDC GLUCOMTR-MCNC: 153 MG/DL (ref 70–130)
GLUCOSE BLDC GLUCOMTR-MCNC: 289 MG/DL (ref 70–130)
GLUCOSE BLDC GLUCOMTR-MCNC: 335 MG/DL (ref 70–130)
GLUCOSE BLDC GLUCOMTR-MCNC: 398 MG/DL (ref 70–130)
GLUCOSE SERPL-MCNC: 126 MG/DL (ref 65–99)
HAV IGM SERPL QL IA: NORMAL
HBV CORE IGM SERPL QL IA: NORMAL
HBV SURFACE AG SERPL QL IA: NORMAL
HCT VFR BLD AUTO: 33.7 % (ref 34–46.6)
HCV AB SER QL: NORMAL
HGB BLD-MCNC: 11 G/DL (ref 12–15.9)
HYPOCHROMIA BLD QL: ABNORMAL
LYMPHOCYTES # BLD MANUAL: 4.57 10*3/MM3 (ref 0.7–3.1)
LYMPHOCYTES NFR BLD MANUAL: 7.1 % (ref 5–12)
Lab: NORMAL
MAGNESIUM SERPL-MCNC: 2.2 MG/DL (ref 1.6–2.6)
MCH RBC QN AUTO: 28.6 PG (ref 26.6–33)
MCHC RBC AUTO-ENTMCNC: 32.6 G/DL (ref 31.5–35.7)
MCV RBC AUTO: 87.8 FL (ref 79–97)
MONOCYTES # BLD: 1.53 10*3/MM3 (ref 0.1–0.9)
NEUTROPHILS # BLD AUTO: 15.44 10*3/MM3 (ref 1.7–7)
NEUTROPHILS NFR BLD MANUAL: 71.7 % (ref 42.7–76)
NRBC BLD AUTO-RTO: 0.1 /100 WBC (ref 0–0.2)
PHOSPHATE SERPL-MCNC: 4 MG/DL (ref 2.5–4.5)
PLAT MORPH BLD: NORMAL
PLATELET # BLD AUTO: 589 10*3/MM3 (ref 140–450)
PMV BLD AUTO: 7.8 FL (ref 6–12)
POLYCHROMASIA BLD QL SMEAR: ABNORMAL
POTASSIUM SERPL-SCNC: 3.2 MMOL/L (ref 3.5–5.2)
POTASSIUM SERPL-SCNC: 4 MMOL/L (ref 3.5–5.2)
PROT SERPL-MCNC: 6.8 G/DL (ref 6–8.5)
RBC # BLD AUTO: 3.84 10*6/MM3 (ref 3.77–5.28)
SODIUM SERPL-SCNC: 138 MMOL/L (ref 136–145)
VARIANT LYMPHS NFR BLD MANUAL: 21.2 % (ref 19.6–45.3)
WBC MORPH BLD: NORMAL
WBC NRBC COR # BLD AUTO: 21.54 10*3/MM3 (ref 3.4–10.8)

## 2024-06-10 PROCEDURE — 25010000002 FUROSEMIDE PER 20 MG: Performed by: INTERNAL MEDICINE

## 2024-06-10 PROCEDURE — 63710000001 INSULIN LISPRO (HUMAN) PER 5 UNITS: Performed by: INTERNAL MEDICINE

## 2024-06-10 PROCEDURE — 80076 HEPATIC FUNCTION PANEL: CPT | Performed by: HOSPITALIST

## 2024-06-10 PROCEDURE — 80048 BASIC METABOLIC PNL TOTAL CA: CPT | Performed by: HOSPITALIST

## 2024-06-10 PROCEDURE — 84132 ASSAY OF SERUM POTASSIUM: CPT | Performed by: INTERNAL MEDICINE

## 2024-06-10 PROCEDURE — 25010000002 HEPARIN (PORCINE) PER 1000 UNITS: Performed by: INTERNAL MEDICINE

## 2024-06-10 PROCEDURE — 84100 ASSAY OF PHOSPHORUS: CPT | Performed by: INTERNAL MEDICINE

## 2024-06-10 PROCEDURE — 94799 UNLISTED PULMONARY SVC/PX: CPT

## 2024-06-10 PROCEDURE — 63710000001 PREDNISONE PER 1 MG: Performed by: HOSPITALIST

## 2024-06-10 PROCEDURE — 94664 DEMO&/EVAL PT USE INHALER: CPT

## 2024-06-10 PROCEDURE — 94760 N-INVAS EAR/PLS OXIMETRY 1: CPT

## 2024-06-10 PROCEDURE — 94761 N-INVAS EAR/PLS OXIMETRY MLT: CPT

## 2024-06-10 PROCEDURE — 85025 COMPLETE CBC W/AUTO DIFF WBC: CPT | Performed by: INTERNAL MEDICINE

## 2024-06-10 PROCEDURE — 94618 PULMONARY STRESS TESTING: CPT

## 2024-06-10 PROCEDURE — 83735 ASSAY OF MAGNESIUM: CPT | Performed by: INTERNAL MEDICINE

## 2024-06-10 PROCEDURE — 80074 ACUTE HEPATITIS PANEL: CPT | Performed by: HOSPITALIST

## 2024-06-10 PROCEDURE — 63710000001 INSULIN GLARGINE PER 5 UNITS: Performed by: HOSPITALIST

## 2024-06-10 PROCEDURE — 82948 REAGENT STRIP/BLOOD GLUCOSE: CPT

## 2024-06-10 PROCEDURE — 85007 BL SMEAR W/DIFF WBC COUNT: CPT | Performed by: INTERNAL MEDICINE

## 2024-06-10 RX ORDER — FUROSEMIDE 40 MG/1
40 TABLET ORAL DAILY
Status: DISCONTINUED | OUTPATIENT
Start: 2024-06-11 | End: 2024-06-11 | Stop reason: HOSPADM

## 2024-06-10 RX ORDER — POTASSIUM CHLORIDE 750 MG/1
40 TABLET, FILM COATED, EXTENDED RELEASE ORAL EVERY 4 HOURS
Status: COMPLETED | OUTPATIENT
Start: 2024-06-10 | End: 2024-06-10

## 2024-06-10 RX ADMIN — AMOXICILLIN AND CLAVULANATE POTASSIUM 1 TABLET: 875; 125 TABLET, FILM COATED ORAL at 21:27

## 2024-06-10 RX ADMIN — HEPARIN SODIUM 5000 UNITS: 5000 INJECTION INTRAVENOUS; SUBCUTANEOUS at 14:48

## 2024-06-10 RX ADMIN — FUROSEMIDE 60 MG: 10 INJECTION, SOLUTION INTRAMUSCULAR; INTRAVENOUS at 03:00

## 2024-06-10 RX ADMIN — INSULIN LISPRO 20 UNITS: 100 INJECTION, SOLUTION INTRAVENOUS; SUBCUTANEOUS at 17:34

## 2024-06-10 RX ADMIN — ASPIRIN 81 MG: 81 TABLET, CHEWABLE ORAL at 08:36

## 2024-06-10 RX ADMIN — AMOXICILLIN AND CLAVULANATE POTASSIUM 1 TABLET: 875; 125 TABLET, FILM COATED ORAL at 08:36

## 2024-06-10 RX ADMIN — INSULIN GLARGINE 30 UNITS: 100 INJECTION, SOLUTION SUBCUTANEOUS at 21:25

## 2024-06-10 RX ADMIN — HEPARIN SODIUM 5000 UNITS: 5000 INJECTION INTRAVENOUS; SUBCUTANEOUS at 21:30

## 2024-06-10 RX ADMIN — FUROSEMIDE 60 MG: 10 INJECTION, SOLUTION INTRAMUSCULAR; INTRAVENOUS at 14:48

## 2024-06-10 RX ADMIN — IPRATROPIUM BROMIDE AND ALBUTEROL SULFATE 3 ML: .5; 3 SOLUTION RESPIRATORY (INHALATION) at 13:49

## 2024-06-10 RX ADMIN — GUAIFENESIN 1200 MG: 600 TABLET, EXTENDED RELEASE ORAL at 08:36

## 2024-06-10 RX ADMIN — GUAIFENESIN 1200 MG: 600 TABLET, EXTENDED RELEASE ORAL at 21:27

## 2024-06-10 RX ADMIN — Medication 10 ML: at 21:32

## 2024-06-10 RX ADMIN — Medication 10 ML: at 08:37

## 2024-06-10 RX ADMIN — DEXTROMETHORPHAN POLISTIREX 60 MG: 30 SUSPENSION ORAL at 08:37

## 2024-06-10 RX ADMIN — HEPARIN SODIUM 5000 UNITS: 5000 INJECTION INTRAVENOUS; SUBCUTANEOUS at 06:36

## 2024-06-10 RX ADMIN — POTASSIUM CHLORIDE 40 MEQ: 750 TABLET, EXTENDED RELEASE ORAL at 11:30

## 2024-06-10 RX ADMIN — IPRATROPIUM BROMIDE AND ALBUTEROL SULFATE 3 ML: .5; 3 SOLUTION RESPIRATORY (INHALATION) at 19:11

## 2024-06-10 RX ADMIN — SERTRALINE 100 MG: 100 TABLET, FILM COATED ORAL at 08:37

## 2024-06-10 RX ADMIN — INSULIN LISPRO 4 UNITS: 100 INJECTION, SOLUTION INTRAVENOUS; SUBCUTANEOUS at 08:37

## 2024-06-10 RX ADMIN — IPRATROPIUM BROMIDE AND ALBUTEROL SULFATE 3 ML: .5; 3 SOLUTION RESPIRATORY (INHALATION) at 06:49

## 2024-06-10 RX ADMIN — DEXTROMETHORPHAN POLISTIREX 60 MG: 30 SUSPENSION ORAL at 21:28

## 2024-06-10 RX ADMIN — INSULIN LISPRO 12 UNITS: 100 INJECTION, SOLUTION INTRAVENOUS; SUBCUTANEOUS at 12:01

## 2024-06-10 RX ADMIN — INSULIN LISPRO 16 UNITS: 100 INJECTION, SOLUTION INTRAVENOUS; SUBCUTANEOUS at 21:24

## 2024-06-10 RX ADMIN — PREDNISONE 40 MG: 20 TABLET ORAL at 08:37

## 2024-06-10 RX ADMIN — FLUTICASONE PROPIONATE 2 SPRAY: 50 SPRAY, METERED NASAL at 08:37

## 2024-06-10 RX ADMIN — POTASSIUM CHLORIDE 40 MEQ: 750 TABLET, EXTENDED RELEASE ORAL at 06:36

## 2024-06-10 NOTE — PLAN OF CARE
Goal Outcome Evaluation:   Patient required 2L NC while sleeping to maintain 02 sat > 92%. Rested well overnight.

## 2024-06-10 NOTE — PROGRESS NOTES
"DAILY PROGRESS NOTE  [facility]    Patient Identification:  Name: Yuliana Moeller  Age: 58 y.o.  Sex: female  :  1965  MRN: [id number]         Primary Care Physician: Daja Fink), JOSEPHINE      Subjective  Patient with no new or acute complaints.  Overall feeling better.  Breathing much easier.    Objective:  General Appearance:  Comfortable, well-appearing, in no acute distress and not in pain.    Vital signs: (most recent): Blood pressure 107/74, pulse 109, temperature 97.5 °F (36.4 °C), temperature source Oral, resp. rate 16, height 149.9 cm (59\"), weight 83 kg (182 lb 14.4 oz), SpO2 94%.    Lungs:  Normal effort and normal respiratory rate.  Breath sounds clear to auscultation.    Heart: Normal rate.  Regular rhythm.    Extremities: There is no dependent edema.    Neurological: Patient is alert and oriented to person, place and time.    Skin:  Warm and dry.                  Vital signs in last 24 hours:  Temp:  [97.7 °F (36.5 °C)-98.6 °F (37 °C)] 98.6 °F (37 °C)  Heart Rate:  [74-98] 92  Resp:  [20] 20  BP: ()/(54-85) 110/73    Intake/Output:    Intake/Output Summary (Last 24 hours) at 6/10/2024 1523  Last data filed at 6/10/2024 0630  Gross per 24 hour   Intake --   Output 1500 ml   Net -1500 ml         Results from last 7 days   Lab Units 06/10/24  0303 24  0517 24  0318 24  0028 24  0415 24  0610 24  0424 24  0425   WBC 10*3/mm3 21.54* 25.56* 19.28* 16.38* 18.42*  --  16.25* 12.87*   HEMOGLOBIN g/dL 11.0* 11.1* 11.4* 9.9* 10.3*  --  10.0* 10.2*   HEMOGLOBIN, POC g/dL  --   --   --   --   --  10.7*  --   --    PLATELETS 10*3/mm3 589* 655* 541* 398 370  --  324 277     Results from last 7 days   Lab Units 06/10/24  1423 06/10/24  0303 24  0517 24  1736 24  0318 24  1833 24  0642 24  1742   SODIUM mmol/L  --  138 136 129* 133* 133* 137 132*   POTASSIUM mmol/L 4.0 3.2* 3.8 3.7 4.4 4.1 4.1 3.1* "   CHLORIDE mmol/L  --  95* 93* 88* 94* 93* 98 91*   CO2 mmol/L  --  33.0* 31.0* 29.0 25.3 25.0 27.5 26.8   BUN mg/dL  --  21* 22* 20 14 11 9 7   CREATININE mg/dL  --  0.65 0.67 0.70 0.53* 0.62 0.51* 0.49*   GLUCOSE mg/dL  --  126* 115* 547* 297* 352* 124* 269*   Estimated Creatinine Clearance: 94.2 mL/min (by C-G formula based on SCr of 0.65 mg/dL).  Results from last 7 days   Lab Units 06/10/24  0303 06/09/24  0517 06/08/24  1736 06/08/24  0318 06/07/24  1833 06/07/24  0642 06/07/24  0028 06/06/24  1742 06/06/24  0415 06/05/24  2302 06/05/24  1035 06/05/24  0424 06/04/24  1202 06/04/24  0425 06/03/24  1759   CALCIUM mg/dL 8.9 9.1 8.8 9.2 9.0 8.5*  --  8.7   < > 8.4*  8.4*   < > 7.9*   < > 8.0* 7.9*   ALBUMIN g/dL 3.5  --   --   --   --  2.9*  --   --   --  3.1*  --  3.0*  --  3.1* 3.1*   MAGNESIUM mg/dL 2.2 2.7*  --  2.2  --   --  2.0  --   --   --   --   --   --  2.1 1.9   PHOSPHORUS mg/dL 4.0 5.2*  --  4.5  --   --  3.9  --   --   --   --   --   --   --   --     < > = values in this interval not displayed.     Results from last 7 days   Lab Units 06/10/24  0303 06/07/24  0642 06/05/24  2302 06/05/24  0424 06/04/24  0425 06/03/24  1759   ALBUMIN g/dL 3.5 2.9* 3.1* 3.0* 3.1* 3.1*   BILIRUBIN mg/dL 0.2 0.4 0.3 0.2 0.3 0.4   ALK PHOS U/L 203* 255* 200* 123* 110 105   AST (SGOT) U/L 105* 277* 141* 99* 74* 80*   ALT (SGPT) U/L 177* 217* 118* 82* 62* 61*       Assessment:  Bilateral pneumonitis/pneumonia: Much improved.  Continue present regime.  Now on oral antibiotics.    Possible fluid overload: Presently appears euvolemic on my exam.  DC IV Lasix.    Acute hypoxic respiratory failure: Secondary to above.  Improved.  Still periodically with O2 sats falling into the mid 80s on 4 L nasal cannula.    Diabetes type 2-reported AISHWARYA: Continue to titrate up Lantus.  Continue to monitor with sliding scale insulin.  Wean prednisone.  Hyponatremia: Resolved.  Monitor.  Hypokalemia: Continue to monitor with replacement  protocol.    Transaminitis: Monitor.  Improved today.  Hepatitis viral panel pending.    Hypertension: Monitor.  Morbid obesity:      Plan:  Please see above.  Potential for discharge tomorrow if continues to improve.  Check walking oximetry.  Discussed with patient.  Discussed with RN.    Bobby Sahu MD  6/10/2024  15:23 EDT

## 2024-06-10 NOTE — PROGRESS NOTES
"  PROGRESS NOTE  Patient Name: Yuliana Moeller  Age/Sex: 58 y.o. female  : 1965  MRN: [id number]    Date of Admission: 6/3/2024  Date of Encounter Visit: 06/10/24   LOS: 5 days   Patient Care Team:  Daja Fink APRN ([place]) as PCP - General (Family Medicine)    Chief Complaint: Pneumonia with respiratory failure    Hospital course: Patient came in with respiratory failure with acute hypoxemia and high oxygen requirements with consolidation on chest imaging.  Echocardiogram was normal.  Patient was started on broad-spectrum antibiotic, zozyn and changed to PO augmentin on 24     DVT prophylaxis with heparin, patient is on systemic steroids and was tapered to p.o. prednisone  Oxygen requirement currently at 4 L/min minor change from yesterday         REVIEW OF SYSTEMS:   CONSTITUTIONAL: no fever or chills  CARDIOVASCULAR: No chest pain, chest pressure or chest discomfort. No palpitations or edema.   RESPIRATORY: Improving shortness of breath, minimal productive secretion, no fever , still significant LIMA   GASTROINTESTINAL: No anorexia, nausea, vomiting or diarrhea. No abdominal pain or blood.   HEMATOLOGIC: No bleeding or bruising.     Ventilator/Non-Invasive Ventilation Settings (From admission, onward)      None              Vital Signs  Temp:  [97.7 °F (36.5 °C)-98.6 °F (37 °C)] 98.6 °F (37 °C)  Heart Rate:  [74-98] 92  Resp:  [20] 20  BP: ()/(54-85) 110/73  SpO2:  [84 %-97 %] 85 %  on  Flow (L/min):  [1-4] 4 Device (Oxygen Therapy): nasal cannula    Intake/Output Summary (Last 24 hours) at 6/10/2024 1626  Last data filed at 6/10/2024 0830  Gross per 24 hour   Intake 240 ml   Output 1200 ml   Net -960 ml     Flowsheet Rows      Flowsheet Row First Filed Value   Admission Height 149.9 cm (59\") Documented at 2024 1501   Admission Weight 81.6 kg (180 lb) Documented at 2024 1501          Body mass index is 36.94 kg/m².      24  1501 24  0055   Weight: 81.6 kg (180 lb) 83 kg " (182 lb 14.4 oz)       Physical Exam:  GEN:  No acute distress, alert, cooperative, well developed, on nasal cannula oxygen  EYES:   Sclerae clear. No icterus. PERRL. Normal EOM  ENT:   External ears/nose normal, no oral lesions, no thrush, mucous membranes moist  NECK:  Supple, midline trachea, no JVD  LUNGS: Normal chest on inspection, audible crackles but improved breath sounds compared to yesterday.  No wheezes. . Respirations regular, even and unlabored.   CV:  Regular rhythm and rate. Normal S1/S2. No murmurs, gallops, or rubs noted.  ABD:  Soft, nontender and nondistended. Normal bowel sounds. No guarding  EXT:  Moves all extremities well. No cyanosis. No redness. No edema.   Skin: Dry, intact, no bleeding    Results Review:    Results From Last 14 Days   Lab Units 06/05/24  1018 06/03/24  1759   LACTATE mmol/L 1.4 1.1     Results from last 7 days   Lab Units 06/10/24  1423 06/10/24  0303 06/09/24  0517 06/08/24  1736 06/08/24  0318 06/07/24  1833 06/07/24  0642 06/06/24  1742 06/06/24  0415 06/05/24  2302 06/05/24  1035 06/05/24  0424 06/04/24  1202 06/04/24  0425 06/03/24  1759   SODIUM mmol/L  --  138 136 129* 133* 133* 137 132*   < > 130*  130*   < > 127*   < > 125* 123*   POTASSIUM mmol/L 4.0 3.2* 3.8 3.7 4.4 4.1 4.1 3.1*   < > 3.7  3.7   < > 3.9   < > 3.4* 3.0*   CHLORIDE mmol/L  --  95* 93* 88* 94* 93* 98 91*   < > 93*  93*   < > 95*   < > 92* 93*   CO2 mmol/L  --  33.0* 31.0* 29.0 25.3 25.0 27.5 26.8   < > 25.0  25.0   < > 21.0*   < > 24.0 20.0*   BUN mg/dL  --  21* 22* 20 14 11 9 7   < > 8  8   < > 8   < > 12 12   CREATININE mg/dL  --  0.65 0.67 0.70 0.53* 0.62 0.51* 0.49*   < > 0.54*  0.54*   < > 0.48*   < > 0.61 0.58   CALCIUM mg/dL  --  8.9 9.1 8.8 9.2 9.0 8.5* 8.7   < > 8.4*  8.4*   < > 7.9*   < > 8.0* 7.9*   AST (SGOT) U/L  --  105*  --   --   --   --  277*  --   --  141*  --  99*  --  74* 80*   ALT (SGPT) U/L  --  177*  --   --   --   --  217*  --   --  118*  --  82*  --  62* 61*   ANION  "GAP mmol/L  --  10.0 12.0 12.0 13.7 15.0 11.5 14.2   < > 12.0  12.0   < > 11.0   < > 9.0 10.0   ALBUMIN g/dL  --  3.5  --   --   --   --  2.9*  --   --  3.1*  --  3.0*  --  3.1* 3.1*    < > = values in this interval not displayed.         Results from last 7 days   Lab Units 06/04/24  1202   TSH uIU/mL 3.710     Results from last 7 days   Lab Units 06/05/24  1035   PROBNP pg/mL 2,095.0*     Results from last 7 days   Lab Units 06/10/24  0303 06/09/24  0517 06/08/24  0318 06/07/24  0028 06/06/24  0415 06/05/24  0610 06/05/24  0424 06/04/24  0425 06/03/24  1738   WBC 10*3/mm3 21.54* 25.56* 19.28* 16.38* 18.42*  --  16.25* 12.87* 14.26*   HEMOGLOBIN g/dL 11.0* 11.1* 11.4* 9.9* 10.3*  --  10.0* 10.2* 11.8*   HEMOGLOBIN, POC g/dL  --   --   --   --   --  10.7*  --   --   --    HEMATOCRIT % 33.7* 33.7* 35.2 29.4* 29.9*  --  29.5* 30.4* 35.2   HEMATOCRIT POC %  --   --   --   --   --  31*  --   --   --    PLATELETS 10*3/mm3 589* 655* 541* 398 370  --  324 277 263   MCV fL 87.8 87.1 87.6 84.5 84.5  --  85.8 86.4 85.6   NEUTROPHIL % %  --   --   --   --   --   --   --  80.2* 83.9*   LYMPHOCYTE % %  --   --   --   --   --   --   --  7.6* 5.9*   MONOCYTES % %  --   --   --   --   --   --   --  8.0 7.8   EOSINOPHIL % %  --   --   --   --   --   --   --  0.3 0.1*   BASOPHIL % %  --   --   --   --   --   --   --  0.3 0.2   IMM GRAN % %  --   --   --   --   --   --   --  3.6* 2.1*         Results from last 7 days   Lab Units 06/10/24  0303 06/09/24  0517 06/08/24  0318 06/07/24  0028 06/04/24  0425 06/03/24  1759   MAGNESIUM mg/dL 2.2 2.7* 2.2 2.0 2.1 1.9           Invalid input(s): \"LDLCALC\"  Results from last 7 days   Lab Units 06/05/24  0610   PH, ARTERIAL pH units 7.431   PCO2, ARTERIAL mm Hg 31.6*   PO2 ART mm Hg 65.9*   HCO3 ART mmol/L 21.0*         Glucose   Date/Time Value Ref Range Status   06/10/2024 1131 289 (H) 70 - 130 mg/dL Final   06/10/2024 0628 153 (H) 70 - 130 mg/dL Final   06/09/2024 2045 345 (H) 70 - 130 mg/dL " Final   06/09/2024 1613 321 (H) 70 - 130 mg/dL Final   06/09/2024 1136 335 (H) 70 - 130 mg/dL Final   06/09/2024 0756 244 (H) 70 - 130 mg/dL Final   06/09/2024 0621 145 (H) 70 - 130 mg/dL Final   06/09/2024 0055 230 (H) 70 - 130 mg/dL Final     Results from last 7 days   Lab Units 06/05/24  2302 06/05/24  1018 06/04/24  0425 06/03/24  1759   PROCALCITONIN ng/mL 0.23  --  0.24  --    LACTATE mmol/L  --  1.4  --  1.1     Results from last 7 days   Lab Units 06/04/24  0154 06/03/24  1738   BLOODCX   --  No growth at 5 days  No growth at 5 days   STREP PNEUMO AG  Negative  --    L. PNEUMOPHILA SEROGP 1 UR AG  Negative  --          Results from last 7 days   Lab Units 06/04/24  1629 06/04/24  0156   COVID19   --  Not Detected   ADENOVIRUS DETECTION BY PCR   --  Not Detected   ADENOVIRUS  Not Detected  --    CORONAVIRUS 229E   --  Not Detected   CORONAVIRUS HKU1   --  Not Detected   CORONAVIRUS NL63   --  Not Detected   CORONAVIRUS OC43   --  Not Detected   HUMAN METAPNEUMOVIRUS   --  Not Detected   HUMAN RHINOVIRUS/ENTEROVIRUS   --  Not Detected   INFLUENZA B PCR   --  Not Detected   PARAINFLUENZA 1   --  Not Detected   PARAINFLUENZA VIRUS 2   --  Not Detected   PARAINFLUENZA VIRUS 3   --  Not Detected   PARAINFLUENZA VIRUS 4   --  Not Detected   BORDETELLA PERTUSSIS PCR   --  Not Detected   BORDETELLA PARAPERTUSSIS PCR   --  Not Detected   CHLAMYDOPHILA PNEUMONIAE PCR   --  Not Detected   MYCOPLAMA PNEUMO PCR   --  Not Detected   RSV, PCR   --  Not Detected     Results from last 7 days   Lab Units 06/04/24  1202 06/04/24  1158   SODIUM UR mmol/L  --  <20   OSMOLALITY UR mOsm/kg  --  539   URIC ACID mg/dL 3.5  --            Imaging:   Imaging Results (All)                   I reviewed the patient's new clinical results.  I personally viewed and interpreted the patient's imaging results:        Medication Review:   amoxicillin-clavulanate, 1 tablet, Oral, Q12H  aspirin, 81 mg, Oral, Daily  dextromethorphan polistirex ER,  60 mg, Oral, Q12H  fluticasone, 2 spray, Each Nare, Daily  [START ON 6/11/2024] furosemide, 40 mg, Oral, Daily  guaiFENesin, 1,200 mg, Oral, Q12H  heparin (porcine), 5,000 Units, Subcutaneous, Q8H  insulin glargine, 30 Units, Subcutaneous, Nightly  insulin lispro, 4-24 Units, Subcutaneous, 4x Daily AC & at Bedtime  ipratropium-albuterol, 3 mL, Nebulization, Q6H While Awake - RT  predniSONE, 40 mg, Oral, Daily With Breakfast  sertraline, 100 mg, Oral, Daily  sodium chloride, 10 mL, Intravenous, Q12H             ASSESSMENT:   Acute hypoxic respiratory failure  Bilateral pulmonary infiltrates  Bilateral consolidations, left greater than right  Suspected fluid overload  Obesity  Type 2 diabetes mellitus  Steroid-induced hyperglycemia  Mild hyponatremia, of no clinical significance    PLAN:  Hyponatremia has resolved, potassium did drop however and patient is on replacement  Patient is afebrile  White count increased yesterday but is trending down again.  Accu-Chek are elevated and that is addressed by the primary team  Antibiotic with oral regimen with the plan to give for 2 more days  She is not ready for discharge mainly because of the DO, despite the improvement  Hopefully she will ready by tomorrow       Discussed with patient   Labs/Notes/films were independently reviewed and pertinent results are summarized above  The copied texts in this note were reviewed and they are accurate as of 06/10/24    Disposition: Probably home tomorrow    Nidhi Lacy MD  06/10/24  16:26 EDT        Dictated utilizing Dragon dictation

## 2024-06-10 NOTE — DISCHARGE PLACEMENT REQUEST
"Yuliana Moeller (58 y.o. Female)       Date of Birth   1965    Social Security Number       Address   [address] [place] [address]    Home Phone   [phone]    MRN   [id number]       Scientology   Restorationism    Marital Status                               Admission Date   6/3/24    Admission Type   Urgent    Admitting Provider   Virgil Khalil MD    Attending Provider   Bobby Sahu MD    Department, Room/Bed   [address], S421/1       Discharge Date       Discharge Disposition       Discharge Destination                                 Attending Provider: Bobby Sahu MD    Allergies: No Known Allergies    Isolation: None   Infection: None   Code Status: CPR    Ht: 149.9 cm (59\")   Wt: 83 kg (182 lb 14.4 oz)    Admission Cmt: None   Principal Problem: Left upper lobe pneumonia [J18.9]                   Active Insurance as of 6/3/2024       Primary Coverage       Payor Plan Insurance Group Employer/Plan Group    [facility] [id number]       Payor Plan Address Payor Plan Phone Number Payor Plan Fax Number Effective Dates    [address] [address]   6/1/2024 - 6/3/2024    [facility] [id number]         Subscriber Name Subscriber Birth Date Member ID       YULIANA MOELLER 1965 [id number]                     Emergency Contacts        (Rel.) Home Phone Work Phone Mobile Phone    DAMIEN MOELLER (Spouse) [phone] -- [phone]                "

## 2024-06-10 NOTE — CASE MANAGEMENT/SOCIAL WORK
Continued Stay Note  [facility]     Patient Name: Yuliana Moeller  MRN: [id number]  Today's Date: 6/10/2024    Admit Date: 6/3/2024    Plan: Home with family, [facility] for O2.   Discharge Plan       Row Name 06/10/24 1318       Plan    Plan Home with family, [facility] for O2.    Patient/Family in Agreement with Plan yes    Plan Comments Walking ox results reviewed. CCP met with pt at bedside to discuss DC. Pt confirms DC plan is to return home, family to transport. CCP discussed walking ox results and need for O2 at DC and preference of DME company; pt denies preference of DME company. [facility] notified of referral; she will follow for home O2 needs. Tiffanie ALFONSO/CCP                   Discharge Codes    No documentation.                 Expected Discharge Date and Time       Expected Discharge Date Expected Discharge Time    Medardo 10, 2024               Litzy Roberts RN

## 2024-06-10 NOTE — PROGRESS NOTES
Exercise Oximetry    Patient Name:Yuliana Moeller   MRN: [id number]   Date: 06/10/24             ROOM AIR BASELINE   SpO2% 88   Heart Rate 97   Blood Pressure      EXERCISE ON ROOM AIR SpO2% EXERCISE ON O2 @ 2 LPM SpO2%   1 MINUTE  1 MINUTE 90   2 MINUTES  2 MINUTES 91   3 MINUTES  3 MINUTES 90   4 MINUTES  4 MINUTES 92   5 MINUTES  5 MINUTES 92   6 MINUTES  6 MINUTES 92              Distance Walked   Distance Walked   Dyspnea (Bere Scale)   Dyspnea (Bere Scale)   Fatigue (Bere Scale)   Fatigue (Bere Scale)   SpO2% Post Exercise  94 SpO2% Post Exercise   HR Post Exercise  101 HR Post Exercise   Time to Recovery  N/A Time to Recovery     Comments: This RT walked with this patient with a finger probe, with 2LNC, and without ambulation device 4 times around the nurses' station.  The patient's saturations were 88% at time of assessment, this RT placed the patient on 2L NC and began walk.  The patient's saturations were able to maintain above 90% during the rest of the walk.  The patient was returned to her room and to her bedside recliner and remained on 2LNC.  As of this assessment, the patient qualifies for home O2.

## 2024-06-11 ENCOUNTER — READMISSION MANAGEMENT (OUTPATIENT)
Dept: CALL CENTER | Facility: HOSPITAL | Age: 59
End: 2024-06-11
Payer: COMMERCIAL

## 2024-06-11 VITALS
RESPIRATION RATE: 16 BRPM | HEART RATE: 109 BPM | TEMPERATURE: 97.5 F | HEIGHT: 59 IN | SYSTOLIC BLOOD PRESSURE: 107 MMHG | DIASTOLIC BLOOD PRESSURE: 74 MMHG | OXYGEN SATURATION: 94 % | BODY MASS INDEX: 36.87 KG/M2 | WEIGHT: 182.9 LBS

## 2024-06-11 LAB
ANION GAP SERPL CALCULATED.3IONS-SCNC: 12.4 MMOL/L (ref 5–15)
ANISOCYTOSIS BLD QL: ABNORMAL
BASOPHILS # BLD MANUAL: 0 10*3/MM3 (ref 0–0.2)
BASOPHILS NFR BLD MANUAL: 0 % (ref 0–1.5)
BUN SERPL-MCNC: 19 MG/DL (ref 6–20)
BUN/CREAT SERPL: 31.1 (ref 7–25)
BURR CELLS BLD QL SMEAR: ABNORMAL
CALCIUM SPEC-SCNC: 9.1 MG/DL (ref 8.6–10.5)
CHLORIDE SERPL-SCNC: 94 MMOL/L (ref 98–107)
CO2 SERPL-SCNC: 31.6 MMOL/L (ref 22–29)
CREAT SERPL-MCNC: 0.61 MG/DL (ref 0.57–1)
DACRYOCYTES BLD QL SMEAR: ABNORMAL
DEPRECATED RDW RBC AUTO: 39.8 FL (ref 37–54)
EGFRCR SERPLBLD CKD-EPI 2021: 104.4 ML/MIN/1.73
EOSINOPHIL # BLD MANUAL: 0.2 10*3/MM3 (ref 0–0.4)
EOSINOPHIL NFR BLD MANUAL: 1 % (ref 0.3–6.2)
ERYTHROCYTE [DISTWIDTH] IN BLOOD BY AUTOMATED COUNT: 12.9 % (ref 12.3–15.4)
GLUCOSE BLDC GLUCOMTR-MCNC: 124 MG/DL (ref 70–130)
GLUCOSE BLDC GLUCOMTR-MCNC: 265 MG/DL (ref 70–130)
GLUCOSE SERPL-MCNC: 126 MG/DL (ref 65–99)
HCT VFR BLD AUTO: 34.3 % (ref 34–46.6)
HGB BLD-MCNC: 11.5 G/DL (ref 12–15.9)
LYMPHOCYTES # BLD MANUAL: 3.51 10*3/MM3 (ref 0.7–3.1)
LYMPHOCYTES NFR BLD MANUAL: 4 % (ref 5–12)
MAGNESIUM SERPL-MCNC: 2.3 MG/DL (ref 1.6–2.6)
MCH RBC QN AUTO: 29 PG (ref 26.6–33)
MCHC RBC AUTO-ENTMCNC: 33.5 G/DL (ref 31.5–35.7)
MCV RBC AUTO: 86.4 FL (ref 79–97)
METAMYELOCYTES NFR BLD MANUAL: 1 % (ref 0–0)
MICROCYTES BLD QL: ABNORMAL
MONOCYTES # BLD: 0.79 10*3/MM3 (ref 0.1–0.9)
NEUTROPHILS # BLD AUTO: 14.84 10*3/MM3 (ref 1.7–7)
NEUTROPHILS NFR BLD MANUAL: 75.2 % (ref 42.7–76)
NRBC BLD AUTO-RTO: 0 /100 WBC (ref 0–0.2)
PHOSPHATE SERPL-MCNC: 4.1 MG/DL (ref 2.5–4.5)
PLASMA CELL PREC NFR BLD MANUAL: 1 % (ref 0–0)
PLAT MORPH BLD: NORMAL
PLATELET # BLD AUTO: 590 10*3/MM3 (ref 140–450)
PMV BLD AUTO: 7.8 FL (ref 6–12)
POIKILOCYTOSIS BLD QL SMEAR: ABNORMAL
POLYCHROMASIA BLD QL SMEAR: ABNORMAL
POTASSIUM SERPL-SCNC: 3.7 MMOL/L (ref 3.5–5.2)
RBC # BLD AUTO: 3.97 10*6/MM3 (ref 3.77–5.28)
SODIUM SERPL-SCNC: 138 MMOL/L (ref 136–145)
VARIANT LYMPHS NFR BLD MANUAL: 17.8 % (ref 19.6–45.3)
WBC MORPH BLD: NORMAL
WBC NRBC COR # BLD AUTO: 19.74 10*3/MM3 (ref 3.4–10.8)

## 2024-06-11 PROCEDURE — 94664 DEMO&/EVAL PT USE INHALER: CPT

## 2024-06-11 PROCEDURE — 82948 REAGENT STRIP/BLOOD GLUCOSE: CPT

## 2024-06-11 PROCEDURE — 94799 UNLISTED PULMONARY SVC/PX: CPT

## 2024-06-11 PROCEDURE — 94760 N-INVAS EAR/PLS OXIMETRY 1: CPT

## 2024-06-11 PROCEDURE — 83735 ASSAY OF MAGNESIUM: CPT | Performed by: INTERNAL MEDICINE

## 2024-06-11 PROCEDURE — 94761 N-INVAS EAR/PLS OXIMETRY MLT: CPT

## 2024-06-11 PROCEDURE — 85025 COMPLETE CBC W/AUTO DIFF WBC: CPT | Performed by: INTERNAL MEDICINE

## 2024-06-11 PROCEDURE — 25010000002 HEPARIN (PORCINE) PER 1000 UNITS: Performed by: INTERNAL MEDICINE

## 2024-06-11 PROCEDURE — 80048 BASIC METABOLIC PNL TOTAL CA: CPT | Performed by: HOSPITALIST

## 2024-06-11 PROCEDURE — 85007 BL SMEAR W/DIFF WBC COUNT: CPT | Performed by: INTERNAL MEDICINE

## 2024-06-11 PROCEDURE — 63710000001 PREDNISONE PER 1 MG: Performed by: HOSPITALIST

## 2024-06-11 PROCEDURE — 84100 ASSAY OF PHOSPHORUS: CPT | Performed by: INTERNAL MEDICINE

## 2024-06-11 PROCEDURE — 63710000001 INSULIN LISPRO (HUMAN) PER 5 UNITS: Performed by: INTERNAL MEDICINE

## 2024-06-11 RX ORDER — AMOXICILLIN AND CLAVULANATE POTASSIUM 875; 125 MG/1; MG/1
1 TABLET, FILM COATED ORAL EVERY 12 HOURS SCHEDULED
Qty: 2 TABLET | Refills: 0 | Status: SHIPPED | OUTPATIENT
Start: 2024-06-11 | End: 2024-06-12

## 2024-06-11 RX ORDER — PREDNISONE 20 MG/1
20 TABLET ORAL
Qty: 3 TABLET | Refills: 0 | Status: SHIPPED | OUTPATIENT
Start: 2024-06-12

## 2024-06-11 RX ADMIN — FUROSEMIDE 40 MG: 40 TABLET ORAL at 10:06

## 2024-06-11 RX ADMIN — FLUTICASONE PROPIONATE 2 SPRAY: 50 SPRAY, METERED NASAL at 10:07

## 2024-06-11 RX ADMIN — SERTRALINE 100 MG: 100 TABLET, FILM COATED ORAL at 10:06

## 2024-06-11 RX ADMIN — ASPIRIN 81 MG: 81 TABLET, CHEWABLE ORAL at 10:06

## 2024-06-11 RX ADMIN — Medication 10 ML: at 10:07

## 2024-06-11 RX ADMIN — INSULIN LISPRO 12 UNITS: 100 INJECTION, SOLUTION INTRAVENOUS; SUBCUTANEOUS at 13:06

## 2024-06-11 RX ADMIN — DEXTROMETHORPHAN POLISTIREX 60 MG: 30 SUSPENSION ORAL at 10:06

## 2024-06-11 RX ADMIN — PREDNISONE 40 MG: 20 TABLET ORAL at 10:06

## 2024-06-11 RX ADMIN — HEPARIN SODIUM 5000 UNITS: 5000 INJECTION INTRAVENOUS; SUBCUTANEOUS at 06:23

## 2024-06-11 RX ADMIN — AMOXICILLIN AND CLAVULANATE POTASSIUM 1 TABLET: 875; 125 TABLET, FILM COATED ORAL at 10:06

## 2024-06-11 RX ADMIN — GUAIFENESIN 1200 MG: 600 TABLET, EXTENDED RELEASE ORAL at 10:06

## 2024-06-11 RX ADMIN — IPRATROPIUM BROMIDE AND ALBUTEROL SULFATE 3 ML: .5; 3 SOLUTION RESPIRATORY (INHALATION) at 07:22

## 2024-06-11 NOTE — DISCHARGE PLACEMENT REQUEST
"Yuliana Moeller (58 y.o. Female)       Date of Birth   1965    Social Security Number       Address   [address] [place] [address]    Home Phone   [phone]    MRN   [id number]       Christianity   Evangelical    Marital Status                               Admission Date   6/3/24    Admission Type   Urgent    Admitting Provider   Virgil Khalil MD    Attending Provider   Bobby Sahu MD    Department, Room/Bed   [address], S[address]/1       Discharge Date       Discharge Disposition       Discharge Destination                                 Attending Provider: Bobby Sahu MD    Allergies: No Known Allergies    Isolation: None   Infection: None   Code Status: CPR    Ht: 149.9 cm (59\")   Wt: 83 kg (182 lb 14.4 oz)    Admission Cmt: None   Principal Problem: Left upper lobe pneumonia [J18.9]                   Active Insurance as of 6/3/2024       Primary Coverage       Payor Plan Insurance Group Employer/Plan Group    [facility] [id number]       Payor Plan Address Payor Plan Phone Number Payor Plan Fax Number Effective Dates    [address] [address]   6/1/2024 - 6/3/2024    [facility] [address]         Subscriber Name Subscriber Birth Date Member ID       YULIANA MOELLER 1965 [id number]                     Emergency Contacts        (Rel.) Home Phone Work Phone Mobile Phone    DAMIEN MOELLER (Spouse) [phone] -- [phone]                "

## 2024-06-11 NOTE — DISCHARGE SUMMARY
PHYSICIAN DISCHARGE SUMMARY                                                                        [facility]    Patient Identification:  Name: Yuliana Moeller  Age: 58 y.o.  Sex: female  :  1965  MRN: [id number]  Primary Care Physician: Daja Fink APRN ([facility])    Admit date: 6/3/2024  Discharge date and time: 2024     Discharged Condition: good    Discharge Diagnoses:  Bilateral pneumonitis/pneumonia:   Acute hypoxic respiratory failure: Secondary to above.    Diabetes type 2-reported AISHWARYA:   Hyponatremia: Resolved.   Hypokalemia: .    Transaminitis: Improving.  Continue to monitor.  Again hepatitis panel negative.  Hypertension: Monitor.  Morbid obesity:    Hospital Course:  Pleasant 58-year-old female presenting with cough and increasing shortness of air.  Please H&P for full details.  Workup did reveal pulmonary filtrates, primarily in the left, hypoxemia, leukocytosis left shift, fever on presentation.  Procalcitonin level was borderline and remained so.  Patient was admitted, cultured and started on broad-spectrum antibiotics with aggressive bronchodilators.  Respiratory status continued to decline as she did end up being transferred to ICU.  Aggressive measures continued including IV steroids.  She was able to be stabilized and brought back to telemetry.  She has been improving slowly.  All cultures remain negative.  She is now weaned down to 2 L nasal cannula and feeling well.  She will be switched over to oral antibiotics and can be discharged at this point for remainder of her treatment follow-up as an outpatient.  She did have a fairly significant bump in her transaminase levels which are improving.  Likely associated with acute illness.  Hepatitis viral panel negative.  I have requested repeat CMP this coming Monday to ensure resolution of the transaminitis.  She has had a walking oximetry within the last 24  hours.  She did well as long she was on 2 L nasal cannula.      Consults:     Consults       Date and Time Order Name Status Description    6/5/2024  6:21 AM Inpatient Pulmonology Consult Completed               Discharge Exam:  Afebrile vital signs stable.  Well-developed well-nourished female no apparent distress.  Lungs clear to auscultation good air movement.  Heart regular rate and rhythm.  Extremities no Kumm sinus edema.  Alert oriented converse cooperative pleasant.     Disposition:  Home    Patient Instructions:      Discharge Medications        New Medications        Instructions Start Date   amoxicillin-clavulanate 875-125 MG per tablet  Commonly known as: AUGMENTIN   1 tablet, Oral, Every 12 Hours Scheduled      ipratropium-albuterol  MCG/ACT inhaler  Commonly known as: COMBIVENT RESPIMAT   1 puff, Inhalation, 4 Times Daily PRN      predniSONE 20 MG tablet  Commonly known as: DELTASONE   20 mg, Oral, Daily With Breakfast   Start Date: June 12, 2024            Changes to Medications        Instructions Start Date   glucagon 1 MG injection  Commonly known as: GLUCAGEN  What changed: additional instructions   For emergency use for severe low glucose: first inject, then call 911, once awake try to give oral treatment.      Insulin Glargine 100 UNIT/ML injection pen  Commonly known as: LANTUS SOLOSTAR  What changed: how much to take   26-40 Units, Subcutaneous, Nightly, And as directed to max of 50 units per day.      ReliOn Pen Needles 32G X 4 MM misc  Generic drug: Insulin Pen Needle  What changed: additional instructions   For use with pen device up to 5 times a day. Can substitute based on availability and insurance. Dx E11.65             Continue These Medications        Instructions Start Date   acetaminophen 650 MG 8 hr tablet  Commonly known as: TYLENOL   650 mg, Oral, 4 Times Daily PRN      aspirin 81 MG chewable tablet   81 mg, Oral, Daily      atorvastatin 80 MG tablet  Commonly known as:  LIPITOR   TAKE 1 TABLET EVERY NIGHT      ezetimibe 10 MG tablet  Commonly known as: ZETIA   10 mg, Oral, Daily      FreeStyle Prasanna 3 Sensor misc   1 each, Does not apply, Every 14 Days      lisinopril 2.5 MG tablet  Commonly known as: PRINIVIL,ZESTRIL   TAKE 1 TABLET EVERY DAY      metFORMIN  MG 24 hr tablet  Commonly known as: GLUCOPHAGE-XR   500 mg, Oral, Daily With Breakfast      NovoLOG FlexPen 100 UNIT/ML solution pen-injector sc pen  Generic drug: insulin aspart   Carb ratio 1 unit for every 10 grams of carbs plus 1 unit for every 30 over 150.  Max daily dose of 60 units      Ozempic (1 MG/DOSE) 4 MG/3ML solution pen-injector  Generic drug: Semaglutide (1 MG/DOSE)   INJECT 1 MG UNDER THE SKIN INTO THE APPROPRIATE AREA AS DIRECTED 1 TIME PER WEEK.      sertraline 100 MG tablet  Commonly known as: ZOLOFT   100 mg, Oral, Daily             Stop These Medications      hydroCHLOROthiazide 12.5 MG capsule  Commonly known as: MICROZIDE            Diet Instructions       Diet: Cardiac Diets, Diabetic Diets; Healthy Heart (2-3 Na+); Thin (IDDSI 0); Consistent Carbohydrate      Discharge Diet:  Cardiac Diets  Diabetic Diets       Cardiac Diet: Healthy Heart (2-3 Na+)    Fluid Consistency: Thin (IDDSI 0)    Diabetic Diet: Consistent Carbohydrate          Future Appointments   Date Time Provider Department Center   7/3/2024  9:00 AM [facility]   7/10/2024 10:30 AM Zeny Branch APRN [facility]     Additional Instructions for the Follow-ups that You Need to Schedule       Discharge Follow-up with PCP   As directed       Currently Documented PCP:    Daja Fink APRN ([facility])    PCP Phone Number:    [phone]     Follow Up Details: 1 week        Discharge Follow-up with Specialty: Pulmonology   As directed      Specialty: Pulmonology   Follow Up Details: As directed               Contact information for follow-up providers       Daja Fink APRN ([facility]) .    Specialty:  Family Medicine  Why: 1 week  Contact information:  [address]  [address]  [place] [address]  [phone]                       Contact information for after-discharge care       Durable Medical Equipment       [facility]    Service: Durable Medical Equipment  Contact information:  [address]  [facility] [address]  [phone]                                 Discharge Order (From admission, onward)       Start     Ordered    06/11/24 1234  Discharge patient  Once        Expected Discharge Date: 06/11/24   Discharge Disposition: Home or Self Care   Physician of Record for Attribution - Please select from Treatment Team: BOBBY ZARCO [[id number]]   Review needed by CMO to determine Physician of Record: No      Question Answer Comment   Physician of Record for Attribution - Please select from Treatment Team BOBBY ZARCO    Review needed by CMO to determine Physician of Record No        06/11/24 1239                      Total time spent discharging patient including evaluation,post hospitalization follow up,  medication and post hospitalization instructions and education total time exceeds 30 minutes.    Signed:  Bobby Zarco MD  6/11/2024  12:40 EDT    EMR Dragon/Transcription disclaimer:   Much of this encounter note is an electronic transcription/translation of spoken language to printed text. The electronic translation of spoken language may permit erroneous, or at times, nonsensical words or phrases to be inadvertently transcribed; Although I have reviewed the note for such errors, some may still exist.

## 2024-06-11 NOTE — PROGRESS NOTES
"  PROGRESS NOTE  Patient Name: Yuliana Moeller  Age/Sex: 58 y.o. female  : 1965  MRN: [id number]    Date of Admission: 6/3/2024  Date of Encounter Visit: 24   LOS: 6 days   Patient Care Team:  Daja Fink APRN ([facility]) as PCP - General (Family Medicine)    Chief Complaint: Pneumonia with respiratory failure    Hospital course: Patient came in with respiratory failure with acute hypoxemia and high oxygen requirements with consolidation on chest imaging.  Echocardiogram was normal.  Patient was started on broad-spectrum antibiotic, zozyn and changed to PO augmentin on 24     Patient has been doing much better since, she is actually off the oxygen since yesterday  She is on oral regimen  She is ambulating with minimal assistance with no significant complaint and she is cleared for discharge         REVIEW OF SYSTEMS:   CONSTITUTIONAL: no fever or chills  CARDIOVASCULAR: No chest pain, chest pressure or chest discomfort. No palpitations or edema.   RESPIRATORY: Improving shortness of breath, on room air even with ambulation  GASTROINTESTINAL: No anorexia, nausea, vomiting or diarrhea. No abdominal pain or blood.   HEMATOLOGIC: No bleeding or bruising.     Ventilator/Non-Invasive Ventilation Settings (From admission, onward)      None              Vital Signs  Temp:  [97.5 °F (36.4 °C)-98.6 °F (37 °C)] 97.5 °F (36.4 °C)  Heart Rate:  [] 109  Resp:  [16-20] 16  BP: (107-127)/(62-78) 107/74  SpO2:  [85 %-100 %] 94 %  on  Flow (L/min):  [2-4] 2 Device (Oxygen Therapy): nasal cannula    Intake/Output Summary (Last 24 hours) at 2024 1110  Last data filed at 2024 0625  Gross per 24 hour   Intake 720 ml   Output 1450 ml   Net -730 ml     Flowsheet Rows      Flowsheet Row First Filed Value   Admission Height 149.9 cm (59\") Documented at 2024 1501   Admission Weight 81.6 kg (180 lb) Documented at 2024 1501          Body mass index is 36.94 kg/m².      24  1501 24  0055 "   Weight: 81.6 kg (180 lb) 83 kg (182 lb 14.4 oz)       Physical Exam:  GEN:  No acute distress, alert, cooperative, well developed, on nasal cannula oxygen  EYES:   Sclerae clear. No icterus. PERRL. Normal EOM  ENT:   External ears/nose normal, no oral lesions, no thrush, mucous membranes moist  NECK:  Supple, midline trachea, no JVD  LUNGS: Normal chest on inspection, crackles are barely audible and nearly resolved.  No wheezes. . Respirations regular, even and unlabored.   CV:  Regular rhythm and rate. Normal S1/S2. No murmurs, gallops, or rubs noted.  ABD:  Soft, nontender and nondistended. Normal bowel sounds. No guarding  EXT:  Moves all extremities well. No cyanosis. No redness. No edema.   Skin: Dry, intact, no bleeding    Results Review:    Results From Last 14 Days   Lab Units 06/05/24  1018 06/03/24  1759   LACTATE mmol/L 1.4 1.1     Results from last 7 days   Lab Units 06/11/24  0437 06/10/24  1423 06/10/24  0303 06/09/24  0517 06/08/24  1736 06/08/24  0318 06/07/24  1833 06/07/24  0642 06/06/24  0415 06/05/24  2302 06/05/24  1035 06/05/24  0424   SODIUM mmol/L 138  --  138 136 129* 133* 133* 137   < > 130*  130*   < > 127*   POTASSIUM mmol/L 3.7 4.0 3.2* 3.8 3.7 4.4 4.1 4.1   < > 3.7  3.7   < > 3.9   CHLORIDE mmol/L 94*  --  95* 93* 88* 94* 93* 98   < > 93*  93*   < > 95*   CO2 mmol/L 31.6*  --  33.0* 31.0* 29.0 25.3 25.0 27.5   < > 25.0  25.0   < > 21.0*   BUN mg/dL 19  --  21* 22* 20 14 11 9   < > 8  8   < > 8   CREATININE mg/dL 0.61  --  0.65 0.67 0.70 0.53* 0.62 0.51*   < > 0.54*  0.54*   < > 0.48*   CALCIUM mg/dL 9.1  --  8.9 9.1 8.8 9.2 9.0 8.5*   < > 8.4*  8.4*   < > 7.9*   AST (SGOT) U/L  --   --  105*  --   --   --   --  277*  --  141*  --  99*   ALT (SGPT) U/L  --   --  177*  --   --   --   --  217*  --  118*  --  82*   ANION GAP mmol/L 12.4  --  10.0 12.0 12.0 13.7 15.0 11.5   < > 12.0  12.0   < > 11.0   ALBUMIN g/dL  --   --  3.5  --   --   --   --  2.9*  --  3.1*  --  3.0*    < > =  "values in this interval not displayed.         Results from last 7 days   Lab Units 06/04/24  1202   TSH uIU/mL 3.710     Results from last 7 days   Lab Units 06/05/24  1035   PROBNP pg/mL 2,095.0*     Results from last 7 days   Lab Units 06/11/24  0437 06/10/24  0303 06/09/24  0517 06/08/24  0318 06/07/24  0028 06/06/24  0415 06/05/24  0610 06/05/24  0424   WBC 10*3/mm3 19.74* 21.54* 25.56* 19.28* 16.38* 18.42*  --  16.25*   HEMOGLOBIN g/dL 11.5* 11.0* 11.1* 11.4* 9.9* 10.3*  --  10.0*   HEMOGLOBIN, POC g/dL  --   --   --   --   --   --  10.7*  --    HEMATOCRIT % 34.3 33.7* 33.7* 35.2 29.4* 29.9*  --  29.5*   HEMATOCRIT POC %  --   --   --   --   --   --  31*  --    PLATELETS 10*3/mm3 590* 589* 655* 541* 398 370  --  324   MCV fL 86.4 87.8 87.1 87.6 84.5 84.5  --  85.8         Results from last 7 days   Lab Units 06/11/24  0437 06/10/24  0303 06/09/24  0517 06/08/24  0318 06/07/24  0028   MAGNESIUM mg/dL 2.3 2.2 2.7* 2.2 2.0           Invalid input(s): \"LDLCALC\"  Results from last 7 days   Lab Units 06/05/24  0610   PH, ARTERIAL pH units 7.431   PCO2, ARTERIAL mm Hg 31.6*   PO2 ART mm Hg 65.9*   HCO3 ART mmol/L 21.0*         Glucose   Date/Time Value Ref Range Status   06/11/2024 0613 124 70 - 130 mg/dL Final   06/10/2024 2109 335 (H) 70 - 130 mg/dL Final   06/10/2024 1713 398 (H) 70 - 130 mg/dL Final   06/10/2024 1131 289 (H) 70 - 130 mg/dL Final   06/10/2024 0628 153 (H) 70 - 130 mg/dL Final   06/09/2024 2045 345 (H) 70 - 130 mg/dL Final   06/09/2024 1613 321 (H) 70 - 130 mg/dL Final   06/09/2024 1136 335 (H) 70 - 130 mg/dL Final     Results from last 7 days   Lab Units 06/05/24  2302 06/05/24  1018   PROCALCITONIN ng/mL 0.23  --    LACTATE mmol/L  --  1.4               Results from last 7 days   Lab Units 06/04/24  1629   ADENOVIRUS  Not Detected     Results from last 7 days   Lab Units 06/04/24  1202 06/04/24  1158   SODIUM UR mmol/L  --  <20   OSMOLALITY UR mOsm/kg  --  539   URIC ACID mg/dL 3.5  --  "           Imaging:   Imaging Results (All)                   I reviewed the patient's new clinical results.  I personally viewed and interpreted the patient's imaging results:        Medication Review:   amoxicillin-clavulanate, 1 tablet, Oral, Q12H  aspirin, 81 mg, Oral, Daily  dextromethorphan polistirex ER, 60 mg, Oral, Q12H  fluticasone, 2 spray, Each Nare, Daily  furosemide, 40 mg, Oral, Daily  guaiFENesin, 1,200 mg, Oral, Q12H  heparin (porcine), 5,000 Units, Subcutaneous, Q8H  insulin glargine, 30 Units, Subcutaneous, Nightly  insulin lispro, 4-24 Units, Subcutaneous, 4x Daily AC & at Bedtime  ipratropium-albuterol, 3 mL, Nebulization, Q6H While Awake - RT  predniSONE, 40 mg, Oral, Daily With Breakfast  sertraline, 100 mg, Oral, Daily  sodium chloride, 10 mL, Intravenous, Q12H             ASSESSMENT:   Acute hypoxic respiratory failure  Bilateral pulmonary infiltrates  Bilateral consolidations, left greater than right  Suspected fluid overload  Obesity  Type 2 diabetes mellitus  Steroid-induced hyperglycemia, hyperglycemia expected, likely to improve as steroids Tapered  Mild hyponatremia, of no clinical significance    PLAN:  Hyponatremia has resolved, potassium did drop however and patient is on replacement  Patient is afebrile  White count is trending down  She is on room air today  Patient is good for discharge home, finish the amoxicillin/clavulanate as ordered  She needs prednisone 20 mg daily for 3 more days after discharge and then she can be off the steroids completely  Expect the hyperglycemia to resolve once she is off the steroid  She follow-up with me in 2 months with a full PFT and a chest x-ray as an outpatient    Discussed with patient, discussed with nursing staff and updated the primary team on the recommendations  Labs/Notes/films were independently reviewed and pertinent results are summarized above  The copied texts in this note were reviewed and they are accurate as of  06/11/24    Disposition: Probably home tomorrow    Nidhi Lacy MD  06/11/24  11:10 EDT        Dictated utilizing Dragon dictation

## 2024-06-11 NOTE — CASE MANAGEMENT/SOCIAL WORK
Case Management Discharge Note      Final Note: Home with family, [facility] for O2. No additional CCP needs.    Provided Post Acute Provider List?: N/A  Provided Post Acute Provider Quality & Resource List?: N/A    Selected Continued Care - Admitted Since 6/3/2024       Destination    No services have been selected for the patient.                Durable Medical Equipment       Service Provider Selected Services Address Phone Fax Patient Preferred    [facility] [address], [place] [address] [phone] --              Dialysis/Infusion    No services have been selected for the patient.                Home Medical Care    No services have been selected for the patient.                Therapy    No services have been selected for the patient.                Community Resources    No services have been selected for the patient.                Community & DME    No services have been selected for the patient.                         Final Discharge Disposition Code: 01 - home or self-care

## 2024-06-11 NOTE — CASE MANAGEMENT/SOCIAL WORK
Continued Stay Note  [facility]     Patient Name: Yuliana Moeller  MRN: [id number]  Today's Date: 6/11/2024    Admit Date: 6/3/2024    Plan: Home with family, [facility] for O2   Discharge Plan       Row Name 06/11/24 1350       Plan    Plan Home with family, [facility] for O2    Patient/Family in Agreement with Plan yes    Plan Comments DC orders noted. [facility] notified and will deliver tank to bedside. DC plan is home with family, O2 thru [facility]. Tiffanie ALFONSO/CCP    Final Discharge Disposition Code 01 - home or self-care    Final Note Home with family, [facility] for O2. No additional CCP needs.                   Discharge Codes    No documentation.                 Expected Discharge Date and Time       Expected Discharge Date Expected Discharge Time    Jun 11, 2024               Litzy Roberts RN

## 2024-06-12 ENCOUNTER — TRANSITIONAL CARE MANAGEMENT TELEPHONE ENCOUNTER (OUTPATIENT)
Dept: CALL CENTER | Facility: HOSPITAL | Age: 59
End: 2024-06-12
Payer: COMMERCIAL

## 2024-06-12 LAB
A FUMIGATUS IGG SER QL: NEGATIVE
A PULLULANS IGG SER QL: NEGATIVE
LACEYELLA SACCHARI AB SER QL ID: NEGATIVE
PIGEON SERUM IGG QL: NEGATIVE
S RECTIVIRGULA IGG SER QL ID: NEGATIVE
T VULGARIS IGG SER QL: NEGATIVE

## 2024-06-12 NOTE — OUTREACH NOTE
Prep Survey      Flowsheet Row Responses   [facility] patient discharged from? [place]   Is LACE score < 7 ? No   Eligibility [facility]   Date of Admission 06/03/24   Date of Discharge 06/11/24   [place] [facility]   Discharge diagnosis Bilateral pneumonitis/pneumonia:   Does the patient have one of the following disease processes/diagnoses(primary or secondary)? Pneumonia   Does the patient have Home health ordered? No   Is there a DME ordered? Yes   What DME was ordered? [facility] for O2   Prep survey completed? Yes            UMER SCOTT - Registered Nurse

## 2024-06-12 NOTE — OUTREACH NOTE
Call Center TCM Note      Flowsheet Row Responses   [facility] patient discharged from? [place]   Does the patient have one of the following disease processes/diagnoses(primary or secondary)? Pneumonia   TCM attempt successful? Yes   Call start time 1436   Call end time 1439   Discharge diagnosis Bilateral pneumonitis/pneumonia:   Meds reviewed with patient/caregiver? Yes   Is the patient having any side effects they believe may be caused by any medication additions or changes? No   Does the patient have all medications ordered at discharge? Yes   Prescription comments New rx's Amoxicillin-clavulanate, Ipratropium-albuterol, Prednisone in place. HCTZ discontinued.   Is the patient taking all medications as directed (includes completed medication regime)? Yes   Comments Pt does want to schedule TCM APPT with PCP JOSEPHINE Fink, needs a.m. appt, but the 830am times I could offer are too early. Please call pt to schedule TCM APPT.   Does the patient have an appointment with their PCP within 7-14 days of discharge? No   Nursing Interventions Routed TCM call to PCP office, PCP office requested to make appointment - message sent   Has home health visited the patient within 72 hours of discharge? N/A   Has all DME been delivered? Yes   Pulse Ox monitoring None   Psychosocial issues? No   Did the patient receive a copy of their discharge instructions? Yes   Nursing interventions Reviewed instructions with patient   What is the patient's perception of their health status since discharge? Improving   If the patient is a current smoker, are they able to teach back resources for cessation? Not a smoker   Is the patient/caregiver able to teach back the hierarchy of who to call/visit for symptoms/problems? PCP, Specialist, Home health nurse, Urgent Care, ED, 911 Yes   Is the patient/caregiver able to teach back signs and symptoms of worsening condition: Fever/chills, Shortness of breath, Chest pain   Is the  patient/caregiver able to teach back importance of completing antibiotic course of treatment? Yes   TCM call completed? Yes   Wrap up additional comments D/C DX: PNA,  pneumonitis - Pt states she is feeing much better, and SOB is greatly improved. No questions at this time. Home O2 in place. Pt does want to schedule TCM APPT with PCP JOSEPHINE Fink, needs a.m. appt, but the 830am times I could offer are too early. Please call pt to schedule TCM APPT.   Call end time 1226            Lo Barron MA    6/12/2024, 14:43 EDT

## 2024-06-21 ENCOUNTER — OFFICE VISIT (OUTPATIENT)
Dept: FAMILY MEDICINE CLINIC | Facility: CLINIC | Age: 59
End: 2024-06-21
Payer: COMMERCIAL

## 2024-06-21 VITALS
TEMPERATURE: 97.5 F | SYSTOLIC BLOOD PRESSURE: 100 MMHG | WEIGHT: 187 LBS | RESPIRATION RATE: 16 BRPM | HEART RATE: 89 BPM | OXYGEN SATURATION: 96 % | BODY MASS INDEX: 37.7 KG/M2 | HEIGHT: 59 IN | DIASTOLIC BLOOD PRESSURE: 64 MMHG

## 2024-06-21 DIAGNOSIS — Z09 HOSPITAL DISCHARGE FOLLOW-UP: ICD-10-CM

## 2024-06-21 DIAGNOSIS — J18.9 PNEUMONIA OF BOTH LUNGS DUE TO INFECTIOUS ORGANISM, UNSPECIFIED PART OF LUNG: Primary | ICD-10-CM

## 2024-06-21 PROCEDURE — 99495 TRANSJ CARE MGMT MOD F2F 14D: CPT | Performed by: NURSE PRACTITIONER

## 2024-06-21 NOTE — PROGRESS NOTES
Subjective   Yuliana Moeller is a 58 y.o. female.     History of Present Illness   Yuliana Moeller 58 y.o. female presents today for hosptial follow up.  she was treated [facility] for pneumonia.  I reviewed all of the labs and diagnostic testing.  The patient's medications were changed:  Current outpatient and discharge medications have been reconciled for the patient.  Reviewed by: JOSEPHINE Young ([place])    she does have a follow up appointment with a specialist: pulmonology in August. Will have repeat CT scan then.        Hospital note as follows:                                                                            PHYSICIAN DISCHARGE SUMMARY                                                                        [facility]     Patient Identification:  Name: Yuliana Moeller  Age: 58 y.o.  Sex: female  :  1965  MRN: [id number]  Primary Care Physician: Daja Fink APRN ([place])     Admit date: 6/3/2024  Discharge date and time: 2024     Discharged Condition: good     Discharge Diagnoses:  Bilateral pneumonitis/pneumonia:   Acute hypoxic respiratory failure: Secondary to above.    Diabetes type 2-reported AISHWARYA:   Hyponatremia: Resolved.   Hypokalemia: .    Transaminitis: Improving.  Continue to monitor.  Again hepatitis panel negative.  Hypertension: Monitor.  Morbid obesity:     Hospital Course:  Pleasant 58-year-old female presenting with cough and increasing shortness of air.  Please H&P for full details.  Workup did reveal pulmonary filtrates, primarily in the left, hypoxemia, leukocytosis left shift, fever on presentation.  Procalcitonin level was borderline and remained so.  Patient was admitted, cultured and started on broad-spectrum antibiotics with aggressive bronchodilators.  Respiratory status continued to decline as she did end up being transferred to ICU.  Aggressive measures continued including IV steroids.  She was able to be stabilized and brought back to telemetry.  She  has been improving slowly.  All cultures remain negative.  She is now weaned down to 2 L nasal cannula and feeling well.  She will be switched over to oral antibiotics and can be discharged at this point for remainder of her treatment follow-up as an outpatient.  She did have a fairly significant bump in her transaminase levels which are improving.  Likely associated with acute illness.  Hepatitis viral panel negative.  I have requested repeat CMP this coming Monday to ensure resolution of the transaminitis.  She has had a walking oximetry within the last 24 hours.  She did well as long she was on 2 L nasal cannula.                She reports feeling improved but is not back to her baseline.  Shortness of breath is improved.  She is using albuterol inhaler as needed.  She does not have a nebulizer at home but does not feel that she needs 1.  She has oxygen at home but has not used it for several days.  Her oxygen level has been staying consistently in the 90s.  She has a follow-up appointment scheduled with pulmonology in August.  She will have repeat CT scan at that time.  She does need to have CBC and CMP repeated.    The following portions of the patient's history were reviewed and updated as appropriate: allergies, current medications, past family history, past medical history, past social history, past surgical history, and problem list.    Review of Systems   Constitutional:  Positive for fatigue. Negative for unexpected weight change.   Respiratory:  Positive for shortness of breath.    Cardiovascular:  Negative for palpitations.   Psychiatric/Behavioral:  Negative for behavioral problems.        Objective   Physical Exam  Vitals and nursing note reviewed.   Constitutional:       Appearance: Normal appearance. She is well-developed.   Cardiovascular:      Rate and Rhythm: Normal rate and regular rhythm.   Pulmonary:      Effort: Pulmonary effort is normal.      Breath sounds: Normal breath sounds.    Neurological:      Mental Status: She is alert and oriented to person, place, and time.   Psychiatric:         Mood and Affect: Mood normal.         Behavior: Behavior normal.         Thought Content: Thought content normal.         Judgment: Judgment normal.         Assessment & Plan   Diagnoses and all orders for this visit:    1. Pneumonia of both lungs due to infectious organism, unspecified part of lung (Primary)  -     CBC & Differential  -     Comprehensive metabolic panel    2. Hospital discharge follow-up             Hospital records reviewed with pt confirming HPI.     Current outpatient and discharge medications have been reconciled for the patient.  Reviewed by: JOSEPHINE Young ([place])

## 2024-06-22 LAB
ALBUMIN SERPL-MCNC: 3.9 G/DL (ref 3.5–5.2)
ALBUMIN/GLOB SERPL: 1.6 G/DL
ALP SERPL-CCNC: 129 U/L (ref 39–117)
ALT SERPL-CCNC: 42 U/L (ref 1–33)
AST SERPL-CCNC: 24 U/L (ref 1–32)
BASOPHILS # BLD AUTO: 0.03 10*3/MM3 (ref 0–0.2)
BASOPHILS NFR BLD AUTO: 0.2 % (ref 0–1.5)
BILIRUB SERPL-MCNC: 0.3 MG/DL (ref 0–1.2)
BUN SERPL-MCNC: 7 MG/DL (ref 6–20)
BUN/CREAT SERPL: 10 (ref 7–25)
CALCIUM SERPL-MCNC: 9.3 MG/DL (ref 8.6–10.5)
CHLORIDE SERPL-SCNC: 103 MMOL/L (ref 98–107)
CO2 SERPL-SCNC: 24.2 MMOL/L (ref 22–29)
CREAT SERPL-MCNC: 0.7 MG/DL (ref 0.57–1)
EGFRCR SERPLBLD CKD-EPI 2021: 101 ML/MIN/1.73
EOSINOPHIL # BLD AUTO: 0.22 10*3/MM3 (ref 0–0.4)
EOSINOPHIL NFR BLD AUTO: 1.8 % (ref 0.3–6.2)
ERYTHROCYTE [DISTWIDTH] IN BLOOD BY AUTOMATED COUNT: 13.6 % (ref 12.3–15.4)
GLOBULIN SER CALC-MCNC: 2.5 GM/DL
GLUCOSE SERPL-MCNC: 158 MG/DL (ref 65–99)
HCT VFR BLD AUTO: 36 % (ref 34–46.6)
HGB BLD-MCNC: 11.7 G/DL (ref 12–15.9)
IMM GRANULOCYTES # BLD AUTO: 0.08 10*3/MM3 (ref 0–0.05)
IMM GRANULOCYTES NFR BLD AUTO: 0.7 % (ref 0–0.5)
LYMPHOCYTES # BLD AUTO: 1.68 10*3/MM3 (ref 0.7–3.1)
LYMPHOCYTES NFR BLD AUTO: 14 % (ref 19.6–45.3)
MCH RBC QN AUTO: 29 PG (ref 26.6–33)
MCHC RBC AUTO-ENTMCNC: 32.5 G/DL (ref 31.5–35.7)
MCV RBC AUTO: 89.3 FL (ref 79–97)
MONOCYTES # BLD AUTO: 0.68 10*3/MM3 (ref 0.1–0.9)
MONOCYTES NFR BLD AUTO: 5.7 % (ref 5–12)
NEUTROPHILS # BLD AUTO: 9.34 10*3/MM3 (ref 1.7–7)
NEUTROPHILS NFR BLD AUTO: 77.6 % (ref 42.7–76)
NRBC BLD AUTO-RTO: 0 /100 WBC (ref 0–0.2)
PLATELET # BLD AUTO: 440 10*3/MM3 (ref 140–450)
POTASSIUM SERPL-SCNC: 4.1 MMOL/L (ref 3.5–5.2)
PROT SERPL-MCNC: 6.4 G/DL (ref 6–8.5)
RBC # BLD AUTO: 4.03 10*6/MM3 (ref 3.77–5.28)
SODIUM SERPL-SCNC: 140 MMOL/L (ref 136–145)
WBC # BLD AUTO: 12.03 10*3/MM3 (ref 3.4–10.8)

## 2024-06-24 DIAGNOSIS — D72.829 LEUKOCYTOSIS, UNSPECIFIED TYPE: Primary | ICD-10-CM

## 2024-06-24 DIAGNOSIS — R74.8 ELEVATED LIVER ENZYMES: ICD-10-CM

## 2024-06-25 ENCOUNTER — APPOINTMENT (OUTPATIENT)
Dept: WOMENS IMAGING | Facility: HOSPITAL | Age: 59
End: 2024-06-25
Payer: COMMERCIAL

## 2024-06-25 DIAGNOSIS — E13.9: ICD-10-CM

## 2024-06-25 PROCEDURE — 77067 SCR MAMMO BI INCL CAD: CPT | Performed by: RADIOLOGY

## 2024-06-25 PROCEDURE — 77063 BREAST TOMOSYNTHESIS BI: CPT | Performed by: RADIOLOGY

## 2024-06-26 RX ORDER — INSULIN GLARGINE 100 [IU]/ML
23-34 INJECTION, SOLUTION SUBCUTANEOUS DAILY
Qty: 30 ML | Refills: 1 | Status: SHIPPED | OUTPATIENT
Start: 2024-06-26

## 2024-06-26 NOTE — TELEPHONE ENCOUNTER
Rx Refill Note  Requested Prescriptions     Pending Prescriptions Disp Refills    Lantus SoloStar 100 UNIT/ML injection pen [Pharmacy Med Name: LANTUS SOLOSTAR PEN INJ 3ML] 36 mL 0     Sig: ADMINISTER 26 TO 40 UNITS UNDER THE SKIN EVERY NIGHT AS DIRECTED. MAX OF 50 UNITS PER DAY      Last office visit with prescribing clinician: 3/7/2024   Last telemedicine visit with prescribing clinician: Visit date not found   Next office visit with prescribing clinician: 7/10/2024                         Would you like a call back once the refill request has been completed: [] Yes [] No    If the office needs to give you a call back, can they leave a voicemail: [] Yes [] No    Vanita Negro  06/26/24, 07:47 EDT

## 2024-06-26 NOTE — TELEPHONE ENCOUNTER
Patient said her pharmacy denied the request on Insulin, she stated as far as she knows it is not her insurance denying it. Wondering if we could send request again, or send something else in that pharmacy will approve

## 2024-07-01 ENCOUNTER — TELEPHONE (OUTPATIENT)
Dept: ENDOCRINOLOGY | Age: 59
End: 2024-07-01
Payer: COMMERCIAL

## 2024-07-01 NOTE — TELEPHONE ENCOUNTER
Called and left message with patient regarding cost of insulin. Will follow up at appointment after leaving second message.    Jessica Romero, PharmD, MM   Clinical Speciality Pharmacist, Endocrinology   [phone]  7/1/2024  10:22 EDT

## 2024-07-03 ENCOUNTER — LAB (OUTPATIENT)
Dept: ENDOCRINOLOGY | Age: 59
End: 2024-07-03
Payer: COMMERCIAL

## 2024-07-03 DIAGNOSIS — E13.9: ICD-10-CM

## 2024-07-03 DIAGNOSIS — I10 PRIMARY HYPERTENSION: ICD-10-CM

## 2024-07-03 DIAGNOSIS — E78.2 MIXED HYPERLIPIDEMIA: ICD-10-CM

## 2024-07-05 DIAGNOSIS — E53.8 B12 DEFICIENCY: ICD-10-CM

## 2024-07-05 DIAGNOSIS — F32.A ANXIETY AND DEPRESSION: ICD-10-CM

## 2024-07-05 DIAGNOSIS — E78.2 MIXED HYPERLIPIDEMIA: ICD-10-CM

## 2024-07-05 DIAGNOSIS — Z86.2 H/O THROMBOCYTOSIS: ICD-10-CM

## 2024-07-05 DIAGNOSIS — E11.65 TYPE 2 DIABETES MELLITUS WITH HYPERGLYCEMIA, WITHOUT LONG-TERM CURRENT USE OF INSULIN: ICD-10-CM

## 2024-07-05 DIAGNOSIS — F41.9 ANXIETY AND DEPRESSION: ICD-10-CM

## 2024-07-05 LAB
ALBUMIN SERPL-MCNC: 4.3 G/DL (ref 3.8–4.9)
ALP SERPL-CCNC: 111 IU/L (ref 44–121)
ALT SERPL-CCNC: 19 IU/L (ref 0–32)
AST SERPL-CCNC: 20 IU/L (ref 0–40)
BILIRUB SERPL-MCNC: 0.2 MG/DL (ref 0–1.2)
BUN SERPL-MCNC: 10 MG/DL (ref 6–24)
BUN/CREAT SERPL: 15 (ref 9–23)
C PEPTIDE SERPL-MCNC: 2 NG/ML (ref 1.1–4.4)
CALCIUM SERPL-MCNC: 9.6 MG/DL (ref 8.7–10.2)
CHLORIDE SERPL-SCNC: 104 MMOL/L (ref 96–106)
CHOLEST SERPL-MCNC: 166 MG/DL (ref 100–199)
CO2 SERPL-SCNC: 22 MMOL/L (ref 20–29)
CREAT SERPL-MCNC: 0.68 MG/DL (ref 0.57–1)
EGFRCR SERPLBLD CKD-EPI 2021: 102 ML/MIN/1.73
GLOBULIN SER CALC-MCNC: 2.4 G/DL (ref 1.5–4.5)
GLUCOSE SERPL-MCNC: 107 MG/DL (ref 70–99)
HBA1C MFR BLD: 7.4 % (ref 4.8–5.6)
HDLC SERPL-MCNC: 53 MG/DL
IMP & REVIEW OF LAB RESULTS: NORMAL
LDLC SERPL CALC-MCNC: 93 MG/DL (ref 0–99)
POTASSIUM SERPL-SCNC: 4.3 MMOL/L (ref 3.5–5.2)
PROT SERPL-MCNC: 6.7 G/DL (ref 6–8.5)
SODIUM SERPL-SCNC: 141 MMOL/L (ref 134–144)
TRIGL SERPL-MCNC: 110 MG/DL (ref 0–149)
VLDLC SERPL CALC-MCNC: 20 MG/DL (ref 5–40)

## 2024-07-05 RX ORDER — SERTRALINE HYDROCHLORIDE 100 MG/1
100 TABLET, FILM COATED ORAL DAILY
Qty: 90 TABLET | Refills: 1 | Status: SHIPPED | OUTPATIENT
Start: 2024-07-05

## 2024-07-10 ENCOUNTER — OFFICE VISIT (OUTPATIENT)
Dept: ENDOCRINOLOGY | Age: 59
End: 2024-07-10
Payer: COMMERCIAL

## 2024-07-10 ENCOUNTER — PRIOR AUTHORIZATION (OUTPATIENT)
Dept: ENDOCRINOLOGY | Age: 59
End: 2024-07-10
Payer: COMMERCIAL

## 2024-07-10 ENCOUNTER — APPOINTMENT (OUTPATIENT)
Dept: WOMENS IMAGING | Facility: HOSPITAL | Age: 59
End: 2024-07-10
Payer: COMMERCIAL

## 2024-07-10 VITALS
HEART RATE: 78 BPM | OXYGEN SATURATION: 99 % | SYSTOLIC BLOOD PRESSURE: 126 MMHG | HEIGHT: 59 IN | WEIGHT: 192.2 LBS | TEMPERATURE: 96.8 F | BODY MASS INDEX: 38.75 KG/M2 | DIASTOLIC BLOOD PRESSURE: 84 MMHG

## 2024-07-10 DIAGNOSIS — E78.2 MIXED HYPERLIPIDEMIA: ICD-10-CM

## 2024-07-10 DIAGNOSIS — I10 PRIMARY HYPERTENSION: ICD-10-CM

## 2024-07-10 DIAGNOSIS — E13.9: Primary | ICD-10-CM

## 2024-07-10 PROCEDURE — 77065 DX MAMMO INCL CAD UNI: CPT | Performed by: RADIOLOGY

## 2024-07-10 PROCEDURE — 95251 CONT GLUC MNTR ANALYSIS I&R: CPT | Performed by: NURSE PRACTITIONER

## 2024-07-10 PROCEDURE — 99214 OFFICE O/P EST MOD 30 MIN: CPT | Performed by: NURSE PRACTITIONER

## 2024-07-10 PROCEDURE — 76642 ULTRASOUND BREAST LIMITED: CPT | Performed by: RADIOLOGY

## 2024-07-10 PROCEDURE — G0279 TOMOSYNTHESIS, MAMMO: HCPCS | Performed by: RADIOLOGY

## 2024-07-10 PROCEDURE — 77061 BREAST TOMOSYNTHESIS UNI: CPT | Performed by: RADIOLOGY

## 2024-07-10 RX ORDER — METFORMIN HYDROCHLORIDE 500 MG/1
500 TABLET, EXTENDED RELEASE ORAL
Qty: 90 TABLET | Refills: 1 | Status: SHIPPED | OUTPATIENT
Start: 2024-07-10

## 2024-07-10 RX ORDER — INSULIN ASPART 100 [IU]/ML
INJECTION, SOLUTION INTRAVENOUS; SUBCUTANEOUS
Qty: 54 ML | Refills: 1 | Status: SHIPPED | OUTPATIENT
Start: 2024-07-10

## 2024-07-10 RX ORDER — INSULIN GLARGINE 100 [IU]/ML
23-34 INJECTION, SOLUTION SUBCUTANEOUS DAILY
Qty: 30 ML | Refills: 1 | Status: SHIPPED | OUTPATIENT
Start: 2024-07-10

## 2024-07-10 RX ORDER — ATORVASTATIN CALCIUM 80 MG/1
80 TABLET, FILM COATED ORAL NIGHTLY
Qty: 90 TABLET | Refills: 1 | Status: SHIPPED | OUTPATIENT
Start: 2024-07-10

## 2024-07-10 RX ORDER — EZETIMIBE 10 MG/1
10 TABLET ORAL DAILY
Qty: 90 TABLET | Refills: 1 | Status: SHIPPED | OUTPATIENT
Start: 2024-07-10

## 2024-07-10 RX ORDER — LISINOPRIL 2.5 MG/1
2.5 TABLET ORAL DAILY
Qty: 90 TABLET | Refills: 1 | Status: SHIPPED | OUTPATIENT
Start: 2024-07-10

## 2024-07-10 RX ORDER — SEMAGLUTIDE 1.34 MG/ML
1 INJECTION, SOLUTION SUBCUTANEOUS WEEKLY
Qty: 9 ML | Refills: 1 | Status: SHIPPED | OUTPATIENT
Start: 2024-07-10

## 2024-07-10 RX ORDER — BLOOD-GLUCOSE SENSOR
1 EACH MISCELLANEOUS
Qty: 6 EACH | Refills: 1 | Status: SHIPPED | OUTPATIENT
Start: 2024-07-10

## 2024-07-10 NOTE — PROGRESS NOTES
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         Subjective          History of Present Illness    Yuliana Moeller 58 y.o. 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    Denies diarrhea, constipation, chest pain, shortness of breath, vision changes or numbness and tingling in feet/hands.    Pt denies a history of diabetic retinopathy.  Last eye exam was 01/24     Pt does not have nephropathy.  Patient is currently taking ACE     Pt denies neuropathy.     Pt denies a history of CAD or CVA.    Last A1C in 07/24 was 7.4    Last microalbumin in 03/24 was negative           Blood Sugars    Blood glucoses are checked via Prasanna 3.    Fasting blood glucoses: 80s- low 100s    Pre-meal blood glucoses: 60s - mid 100s    Pt has episodes of hypoglycemia.          Sensor Data    Time in range 94%  High 6%  Very high 0%  Low 0%  Very low 0%      Average Glucose - 122 mg/dL      Sensor Wear - 87 %            Hyperlipidemia     Pt is currently taking atorvastatin 80 mg HS and Zetia 10 mg daily     Last lipid panel in 07/24 showed Total 166, HDL 53, LDL 93 and Triglycerides 110            Hypertension    Pt denies any chest pain, palpitations, shortness of breath or headache    Current regimen includes lisinopril 2.5 mg daily            I have  "reviewed the patient's allergies, medicines, past medical hx, family hx and social hx.    Objective   Vital Signs:   /84   Pulse 78   Temp 96.8 °F (36 °C) (Temporal)   Ht 149.9 cm (59.02\")   Wt 87.2 kg (192 lb 3.2 oz)   SpO2 99%   BMI 38.80 kg/m²       Physical Exam   Physical Exam  Constitutional:       General: She is not in acute distress.     Appearance: Normal appearance. She is not diaphoretic.   HENT:      Head: Normocephalic and atraumatic.   Eyes:      General:         Right eye: No discharge.         Left eye: No discharge.   Skin:     General: Skin is warm and dry.   Neurological:      Mental Status: She is alert.   Psychiatric:         Mood and Affect: Mood normal.         Behavior: Behavior normal.                    Results Review:   Hemoglobin A1C   Date Value Ref Range Status   07/03/2024 7.4 (H) 4.8 - 5.6 % Final     Comment:              Prediabetes: 5.7 - 6.4           Diabetes: >6.4           Glycemic control for adults with diabetes: <7.0     05/26/2021 11.0 (H) 4.3 - 5.6 % Final     Comment:     (note)  A1C% Reference Range:  4.3 - 5.6  Normal range  5.7 - 6.4  Pre-diabetic -increased risk for developing diabetes mellitus.  >=6.5      Diabetic -diagnostic of diabetes mellitus.     Note: For diagnosis of diabetes in individuals without unequivocal   hyperglycemia, results should be confirmed by repeat testing.  Patients with conditions that shorten erythrocyte survival, such as  recovery from acute blood loss, hemolytic anemia, kidney disease,  or the presence of unstable hemoglobins like HbSS, HbCC, and HbSC  may yield falsely decreased HbA1c test results. Iron deficiency may  yield falsely increased HbA1c test results.     Triglycerides   Date Value Ref Range Status   07/03/2024 110 0 - 149 mg/dL Final   09/30/2020 156 (H) 0 - 149 mg/dL Final     HDL Cholesterol   Date Value Ref Range Status   07/03/2024 53 >39 mg/dL Final   09/30/2020 47 (L) >49 mg/dL Final     LDL Cholesterol  "   Date Value Ref Range Status   09/30/2020 160 (H) 0 - 100 mg/dL Final     LDL Chol Calc (NIH)   Date Value Ref Range Status   07/03/2024 93 0 - 99 mg/dL Final     VLDL Cholesterol   Date Value Ref Range Status   09/30/2020 31 5 - 40 mg/dL Final     VLDL Cholesterol Meek   Date Value Ref Range Status   07/03/2024 20 5 - 40 mg/dL Final         Assessment and Plan {CC Problem List  Visit Diagnosis  ROS  Review (Popup)  Health Maintenance  Quality  BestPractice  Medications  SmartSets  SnapShot Encounters  Media :23  Diagnoses and all orders for this visit:    1. 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  Max daily dose of 60 units  Dispense: 54 mL; Refill: 1  -     Insulin Glargine (Lantus SoloStar) 100 UNIT/ML injection pen; Inject 23-34 Units under the skin into the appropriate area as directed Daily.  Dispense: 30 mL; Refill: 1  -     Continuous Glucose Sensor (FreeStyle Prasanna 3 Sensor) misc; Use 1 each Every 14 (Fourteen) Days.  Dispense: 6 each; Refill: 1  -     Semaglutide, 1 MG/DOSE, (Ozempic, 1 MG/DOSE,) 4 MG/3ML solution pen-injector; Inject 1 mg under the skin into the appropriate area as directed 1 (One) Time Per Week.  Dispense: 9 mL; Refill: 1  -     Hemoglobin A1c; Future  -     Comprehensive Metabolic Panel; Future    A1c is slightly higher at 7.4% - but been sick in June  Continue with Lantus 23 units daily  Continue with Novolog 8 untis before breakfast and 10-12 units before dinner - if being active after breakfast then take 4 units before breakfast  Continue with Ozempic 1 mg weekly   Continue with metformin  mg 1 tablet daily  Continue with Prasanna  C.Peptide still detectable       2. Mixed hyperlipidemia  -     ezetimibe (ZETIA) 10 MG tablet; Take 1 tablet by mouth Daily.  Dispense:  90 tablet; Refill: 1  -     atorvastatin (LIPITOR) 80 MG tablet; Take 1 tablet by mouth Every Night.  Dispense: 90 tablet; Refill: 1  -     metFORMIN ER (GLUCOPHAGE-XR) 500 MG 24 hr tablet; Take 1 tablet by mouth Daily With Breakfast.  Dispense: 90 tablet; Refill: 1  -     lisinopril (PRINIVIL,ZESTRIL) 2.5 MG tablet; Take 1 tablet by mouth Daily.  Dispense: 90 tablet; Refill: 1  -     Comprehensive Metabolic Panel; Future  -     Lipid Panel; Future    Lipid panel is good.    Continue with statin.      3. Primary hypertension  -     Comprehensive Metabolic Panel; Future    Stable  Continue with current medication regimen  Defer management to PCP       Refills sent to pharmacy      RTC in 4 months with me, labs prior      Follow Up     Patient was given instructions and counseling regarding her condition or for health maintenance advice. Please see specific information pulled into the AVS if appropriate.                JOSEPHINE Wayne  07/10/24

## 2024-07-11 RX ORDER — INSULIN LISPRO 100 [IU]/ML
8-12 INJECTION, SOLUTION INTRAVENOUS; SUBCUTANEOUS
Qty: 30 ML | Refills: 1 | Status: SHIPPED | OUTPATIENT
Start: 2024-07-11

## 2024-07-11 NOTE — TELEPHONE ENCOUNTER
Left pt a detailed message regarding medication change. Advised to return call with any questions.

## 2024-07-16 ENCOUNTER — TELEPHONE (OUTPATIENT)
Dept: FAMILY MEDICINE CLINIC | Facility: CLINIC | Age: 59
End: 2024-07-16
Payer: COMMERCIAL

## 2024-07-16 NOTE — TELEPHONE ENCOUNTER
Caller: Yuliana Moeller    Relationship to patient: Self    Best call back number: [phone]    /Date of positive COVID19 test: 07/16    Date of possible COVID19 exposure: 7/13 PATIENT WAS AT A PARTY    COVID19 symptoms: SORE THROAT, HEADACHE, NOSE IS RUNNING, FEVER, CHILLS, SWEATING     Date of initial quarantine: 07/16    Additional information or concerns: PLEASE ADVISE PATIENT ON NEXT STEPS OF CARE ASAP.     PATIENT WAS IN THE HOSPITAL FOR SEVEN OR EIGHT DAYS A MONTH AGO AND STATES THAT SHE HAD PNEUMONIA.    What is the patients preferred pharmacy:   [facility] [id number] - [place], [place] - [address] [address] AT [facility] - [phone] [facility] [phone]  [phone]

## 2024-07-17 ENCOUNTER — TELEPHONE (OUTPATIENT)
Dept: FAMILY MEDICINE CLINIC | Facility: CLINIC | Age: 59
End: 2024-07-17
Payer: COMMERCIAL

## 2024-07-17 NOTE — TELEPHONE ENCOUNTER
Spoke with patient to inform her per Daja Fink   Vit D, zinc and vit c for sure.  Quercetin if she   can find it over the counter.  Mucinex to   loosen drainage.  Set her up for video visit   with someone today or me tomorrow if not   improving.  Can also prescribe ivermectin if   needed.   Patient voiced understanding and stated that she would call our office back if symptoms have not approved

## 2024-08-19 ENCOUNTER — TRANSCRIBE ORDERS (OUTPATIENT)
Dept: ADMINISTRATIVE | Facility: HOSPITAL | Age: 59
End: 2024-08-19
Payer: COMMERCIAL

## 2024-08-19 DIAGNOSIS — I27.20 PULMONARY HYPERTENSION: Primary | ICD-10-CM

## 2024-09-11 ENCOUNTER — HOSPITAL ENCOUNTER (OUTPATIENT)
Dept: CARDIOLOGY | Facility: HOSPITAL | Age: 59
Discharge: HOME | End: 2024-09-11
Admitting: INTERNAL MEDICINE
Payer: COMMERCIAL

## 2024-09-11 VITALS
WEIGHT: 192 LBS | BODY MASS INDEX: 38.71 KG/M2 | HEIGHT: 59 IN | HEART RATE: 72 BPM | SYSTOLIC BLOOD PRESSURE: 150 MMHG | DIASTOLIC BLOOD PRESSURE: 80 MMHG

## 2024-09-11 DIAGNOSIS — I27.20 PULMONARY HYPERTENSION: ICD-10-CM

## 2024-09-11 LAB
AORTIC ARCH: 2.8 CM
ASCENDING AORTA: 2.8 CM
BH CV ECHO MEAS - ACS: 1.45 CM
BH CV ECHO MEAS - AO MAX PG: 5.7 MMHG
BH CV ECHO MEAS - AO MEAN PG: 3.2 MMHG
BH CV ECHO MEAS - AO ROOT DIAM: 2.7 CM
BH CV ECHO MEAS - AO V2 MAX: 119 CM/SEC
BH CV ECHO MEAS - AO V2 VTI: 29.1 CM
BH CV ECHO MEAS - AVA(I,D): 2.13 CM2
BH CV ECHO MEAS - EDV(CUBED): 55.3 ML
BH CV ECHO MEAS - EDV(MOD-SP2): 65 ML
BH CV ECHO MEAS - EDV(MOD-SP4): 61 ML
BH CV ECHO MEAS - EF(MOD-BP): 66.1 %
BH CV ECHO MEAS - EF(MOD-SP2): 70.8 %
BH CV ECHO MEAS - EF(MOD-SP4): 63.9 %
BH CV ECHO MEAS - ESV(CUBED): 18.8 ML
BH CV ECHO MEAS - ESV(MOD-SP2): 19 ML
BH CV ECHO MEAS - ESV(MOD-SP4): 22 ML
BH CV ECHO MEAS - FS: 30.2 %
BH CV ECHO MEAS - IVS/LVPW: 1 CM
BH CV ECHO MEAS - IVSD: 0.96 CM
BH CV ECHO MEAS - LAT PEAK E' VEL: 7.2 CM/SEC
BH CV ECHO MEAS - LV MASS(C)D: 111 GRAMS
BH CV ECHO MEAS - LV MAX PG: 3.4 MMHG
BH CV ECHO MEAS - LV MEAN PG: 1.61 MMHG
BH CV ECHO MEAS - LV V1 MAX: 91.9 CM/SEC
BH CV ECHO MEAS - LV V1 VTI: 20.6 CM
BH CV ECHO MEAS - LVIDD: 3.8 CM
BH CV ECHO MEAS - LVIDS: 2.7 CM
BH CV ECHO MEAS - LVOT AREA: 3 CM2
BH CV ECHO MEAS - LVOT DIAM: 1.96 CM
BH CV ECHO MEAS - LVPWD: 0.96 CM
BH CV ECHO MEAS - MED PEAK E' VEL: 7.5 CM/SEC
BH CV ECHO MEAS - MV A DUR: 0.13 SEC
BH CV ECHO MEAS - MV A MAX VEL: 57.4 CM/SEC
BH CV ECHO MEAS - MV DEC SLOPE: 483.8 CM/SEC2
BH CV ECHO MEAS - MV DEC TIME: 0.2 SEC
BH CV ECHO MEAS - MV E MAX VEL: 88.6 CM/SEC
BH CV ECHO MEAS - MV E/A: 1.54
BH CV ECHO MEAS - MV MAX PG: 5.2 MMHG
BH CV ECHO MEAS - MV MEAN PG: 1.91 MMHG
BH CV ECHO MEAS - MV P1/2T: 66.4 MSEC
BH CV ECHO MEAS - MV V2 VTI: 27.8 CM
BH CV ECHO MEAS - MVA(P1/2T): 3.3 CM2
BH CV ECHO MEAS - MVA(VTI): 2.23 CM2
BH CV ECHO MEAS - PA ACC TIME: 0.13 SEC
BH CV ECHO MEAS - PA V2 MAX: 68.4 CM/SEC
BH CV ECHO MEAS - PULM A REVS DUR: 0.11 SEC
BH CV ECHO MEAS - PULM A REVS VEL: 27.3 CM/SEC
BH CV ECHO MEAS - PULM DIAS VEL: 19.4 CM/SEC
BH CV ECHO MEAS - PULM S/D: 1.86
BH CV ECHO MEAS - PULM SYS VEL: 36.2 CM/SEC
BH CV ECHO MEAS - RAP SYSTOLE: 3 MMHG
BH CV ECHO MEAS - RV MAX PG: 2.38 MMHG
BH CV ECHO MEAS - RV V1 MAX: 77.1 CM/SEC
BH CV ECHO MEAS - RV V1 VTI: 15.4 CM
BH CV ECHO MEAS - RVSP: 23 MMHG
BH CV ECHO MEAS - SV(LVOT): 62.1 ML
BH CV ECHO MEAS - SV(MOD-SP2): 46 ML
BH CV ECHO MEAS - SV(MOD-SP4): 39 ML
BH CV ECHO MEAS - TAPSE (>1.6): 2 CM
BH CV ECHO MEAS - TR MAX PG: 20.2 MMHG
BH CV ECHO MEAS - TR MAX VEL: 224.7 CM/SEC
BH CV ECHO MEASUREMENTS AVERAGE E/E' RATIO: 12.05
BH CV XLRA - RV BASE: 2.5 CM
BH CV XLRA - RV LENGTH: 5.8 CM
BH CV XLRA - RV MID: 2.44 CM
BH CV XLRA - TDI S': 7.9 CM/SEC
LEFT ATRIUM VOLUME INDEX: 32.7 ML/M2
SINUS: 2.6 CM
STJ: 2.33 CM

## 2024-09-11 PROCEDURE — 93306 TTE W/DOPPLER COMPLETE: CPT

## 2024-10-10 ENCOUNTER — TELEMEDICINE (OUTPATIENT)
Dept: FAMILY MEDICINE CLINIC | Facility: CLINIC | Age: 59
End: 2024-10-10
Payer: COMMERCIAL

## 2024-10-10 DIAGNOSIS — L65.9 HAIR LOSS: Primary | ICD-10-CM

## 2024-10-10 PROCEDURE — 99213 OFFICE O/P EST LOW 20 MIN: CPT | Performed by: NURSE PRACTITIONER

## 2024-10-10 NOTE — PROGRESS NOTES
Subjective   Yuliana Moeller is a 59 y.o. female.   You have chosen to receive care through a telehealth visit.  Do you consent to use a video/audio connection for your medical care today? Yes  Patient at work during time of encounter, provider in office.  History of Present Illness   Patient presents for video visit to discuss continued hair thinning since hospital discharge.  She has ordered a neutrophil supplement that she has been taking but has not taken yet this week.  She is not sure what is contributing to the hair loss or if it may be continued due to stress.  She has labs with endocrinology but is not having thyroid checked.  She has not noticed any other significant weight change or cold intolerance or constipation.  The following portions of the patient's history were reviewed and updated as appropriate: allergies, current medications, past family history, past medical history, past social history, past surgical history, and problem list.    Review of Systems   Constitutional:  Negative for unexpected weight change.   Respiratory:  Negative for shortness of breath.    Cardiovascular:  Negative for chest pain and palpitations.   Endocrine: Negative for cold intolerance.   Psychiatric/Behavioral:  Negative for behavioral problems.        Objective   Physical Exam  Vitals and nursing note reviewed.   Constitutional:       Appearance: She is well-developed.   Cardiovascular:      Rate and Rhythm: Regular rhythm.   Pulmonary:      Effort: Pulmonary effort is normal.   Neurological:      Mental Status: She is alert and oriented to person, place, and time.   Psychiatric:         Mood and Affect: Mood normal.         Behavior: Behavior normal.         Thought Content: Thought content normal.         Judgment: Judgment normal.         Assessment & Plan   Diagnoses and all orders for this visit:    1. Hair loss (Primary)  -     TSH  -     T4, free  -     T3, free  -     Vitamin D 25 hydroxy  -     Iron level  -      Ferritin  -     Vitamin B12             She is scheduled for labs tomorrow.  If labs do not show cause of hair loss, will refer to dermatology for further evaluation.  Patient is agreeable to this.    Total time spent on encounter was 7 minutes.

## 2024-10-12 LAB
25(OH)D3+25(OH)D2 SERPL-MCNC: 22.8 NG/ML (ref 30–100)
FERRITIN SERPL-MCNC: 24.8 NG/ML (ref 13–150)
IRON SERPL-MCNC: 59 MCG/DL (ref 37–145)
T3FREE SERPL-MCNC: 3.1 PG/ML (ref 2–4.4)
T4 FREE SERPL-MCNC: 1.02 NG/DL (ref 0.92–1.68)
TSH SERPL DL<=0.005 MIU/L-ACNC: 1.79 UIU/ML (ref 0.27–4.2)
VIT B12 SERPL-MCNC: 294 PG/ML (ref 211–946)

## 2024-11-06 RX ORDER — BLOOD-GLUCOSE SENSOR
1 EACH MISCELLANEOUS TAKE AS DIRECTED
Qty: 6 EACH | Refills: 1 | Status: SHIPPED | OUTPATIENT
Start: 2024-11-06

## 2024-11-14 ENCOUNTER — OFFICE VISIT (OUTPATIENT)
Dept: ENDOCRINOLOGY | Age: 59
End: 2024-11-14
Payer: COMMERCIAL

## 2024-11-14 VITALS
BODY MASS INDEX: 38.78 KG/M2 | WEIGHT: 192 LBS | SYSTOLIC BLOOD PRESSURE: 130 MMHG | HEART RATE: 83 BPM | DIASTOLIC BLOOD PRESSURE: 90 MMHG | TEMPERATURE: 98.1 F | OXYGEN SATURATION: 98 %

## 2024-11-14 DIAGNOSIS — E78.2 MIXED HYPERLIPIDEMIA: ICD-10-CM

## 2024-11-14 DIAGNOSIS — E13.9: Primary | ICD-10-CM

## 2024-11-14 DIAGNOSIS — I10 PRIMARY HYPERTENSION: ICD-10-CM

## 2024-11-14 NOTE — PATIENT INSTRUCTIONS
Continue with Lantus 23 units daily  Change Novolog 6 untis before breakfast and 10-12 units before dinner  Continue with Ozempic 1 mg weekly   Continue with metformin  mg 1 tablet daily

## 2024-11-14 NOTE — PROGRESS NOTES
Chief Complaint  Chief Complaint   Patient presents with    Latent autoimmune diabetes mellitus in adult (AISHWARYA), manage     Prasanna report attached       Subjective          History of Present Illness    Yluiana Moeller 59 y.o.  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    Pt denies a history of diabetic retinopathy.  Last eye exam was 01/24     Pt does not have nephropathy.  Patient is currently taking ACE     Pt denies neuropathy.     Pt denies a history of CAD or CVA.    Last A1C in 11/24 was 6.5    Last microalbumin in 03/24 was negative          Blood Sugars    Blood glucoses are checked via Prasanna.    Fasting blood glucoses: low 100s    Pre-meal blood glucoses: 60s - high 100s    Pt has episodes of hypoglycemia - due to taking too much insulin with meals          Sensor Data    Time in range 89%  High 10%  Very high 1%  Low 0%  Very low 0%      Average Glucose - 134 mg/dL      Sensor Wear - 95 %            Hyperlipidemia     Pt is currently taking atorvastatin 80 mg HS and Zetia 10 mg daily     Last lipid panel in 11/24 showed Total 165, HDL 63, LDL 88 and Triglycerides 72            Hypertension    Pt denies any chest pain, palpitations, shortness of breath or headache    Current regimen includes lisinopril 2.5 mg daily             I have reviewed the patient's allergies, medicines, past medical hx, family hx and social hx.    Objective   Vital Signs:   /90   Pulse 83   Temp 98.1 °F (36.7  °C) (Oral)   Wt 87.1 kg (192 lb)   SpO2 98%   BMI 38.78 kg/m²       Physical Exam   Physical Exam  Constitutional:       General: She is not in acute distress.     Appearance: Normal appearance. She is not diaphoretic.   HENT:      Head: Normocephalic and atraumatic.   Eyes:      General:         Right eye: No discharge.         Left eye: No discharge.   Skin:     General: Skin is warm and dry.   Neurological:      Mental Status: She is alert.   Psychiatric:         Mood and Affect: Mood normal.         Behavior: Behavior normal.                    Results Review:   Hemoglobin A1C   Date Value Ref Range Status   11/07/2024 6.50 (H) 4.80 - 5.60 % Final     Comment:     Hemoglobin A1C Ranges:  Increased Risk for Diabetes  5.7% to 6.4%  Diabetes                     >= 6.5%  Diabetic Goal                < 7.0%     05/26/2021 11.0 (H) 4.3 - 5.6 % Final     Comment:     (note)  A1C% Reference Range:  4.3 - 5.6  Normal range  5.7 - 6.4  Pre-diabetic -increased risk for developing diabetes mellitus.  >=6.5      Diabetic -diagnostic of diabetes mellitus.     Note: For diagnosis of diabetes in individuals without unequivocal   hyperglycemia, results should be confirmed by repeat testing.  Patients with conditions that shorten erythrocyte survival, such as  recovery from acute blood loss, hemolytic anemia, kidney disease,  or the presence of unstable hemoglobins like HbSS, HbCC, and HbSC  may yield falsely decreased HbA1c test results. Iron deficiency may  yield falsely increased HbA1c test results.     Triglycerides   Date Value Ref Range Status   11/07/2024 72 0 - 150 mg/dL Final   09/30/2020 156 (H) 0 - 149 mg/dL Final     HDL Cholesterol   Date Value Ref Range Status   11/07/2024 63 (H) 40 - 60 mg/dL Final   09/30/2020 47 (L) >49 mg/dL Final     LDL Cholesterol    Date Value Ref Range Status   09/30/2020 160 (H) 0 - 100 mg/dL Final     LDL Chol Calc (NIH)   Date Value Ref Range Status   11/07/2024 88 0 - 100 mg/dL Final      VLDL Cholesterol   Date Value Ref Range Status   09/30/2020 31 5 - 40 mg/dL Final     VLDL Cholesterol Meek   Date Value Ref Range Status   11/07/2024 14 5 - 40 mg/dL Final         Assessment and Plan {CC Problem List  Visit Diagnosis  ROS  Review (Popup)  Health Maintenance  Quality  BestPractice  Medications  SmartSets  SnapShot Encounters  Media :23  Diagnoses and all orders for this visit:    1. 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 Mixed hyperlipidemia  -     Basic Metabolic Panel; Future    Lipid panel is good.    Continue with statin and Zetia      3. Primary hypertension  -     Basic Metabolic Panel; Future    Stable  Continue with current medication regimen  Defer management to PCP           No refills needed at this time      RTC in 4 months with me, labs prior      Follow Up     Patient was given instructions and counseling regarding her condition or for health maintenance advice. Please see specific information pulled into the AVS if appropriate.                Zeny Branch, APRN  11/14/24

## 2025-01-07 DIAGNOSIS — F32.A ANXIETY AND DEPRESSION: ICD-10-CM

## 2025-01-07 DIAGNOSIS — F41.9 ANXIETY AND DEPRESSION: ICD-10-CM

## 2025-01-07 DIAGNOSIS — E11.65 TYPE 2 DIABETES MELLITUS WITH HYPERGLYCEMIA, WITHOUT LONG-TERM CURRENT USE OF INSULIN: ICD-10-CM

## 2025-01-07 DIAGNOSIS — Z86.2 H/O THROMBOCYTOSIS: ICD-10-CM

## 2025-01-07 DIAGNOSIS — E53.8 B12 DEFICIENCY: ICD-10-CM

## 2025-01-07 DIAGNOSIS — E78.2 MIXED HYPERLIPIDEMIA: ICD-10-CM

## 2025-01-08 RX ORDER — SERTRALINE HYDROCHLORIDE 100 MG/1
100 TABLET, FILM COATED ORAL DAILY
Qty: 90 TABLET | Refills: 1 | Status: SHIPPED | OUTPATIENT
Start: 2025-01-08

## 2025-01-08 NOTE — TELEPHONE ENCOUNTER
Rx Refill Note  Requested Prescriptions     Pending Prescriptions Disp Refills    sertraline (ZOLOFT) 100 MG tablet [Pharmacy Med Name: SERTRALINE  MG TABLET] 90 tablet 1     Sig: TAKE 1 TABLET BY MOUTH DAILY      Last office visit with prescribing clinician: 6/21/2024   Last telemedicine visit with prescribing clinician: 10/10/2024   Next office visit with prescribing clinician: Visit date not found                         Would you like a call back once the refill request has been completed: [] Yes [] No    If the office needs to give you a call back, can they leave a voicemail: [] Yes [] No    Christina Moody MA  01/08/25, 09:47 EST

## 2025-02-05 DIAGNOSIS — E78.2 MIXED HYPERLIPIDEMIA: ICD-10-CM

## 2025-02-05 RX ORDER — METFORMIN HYDROCHLORIDE 500 MG/1
500 TABLET, EXTENDED RELEASE ORAL
Qty: 90 TABLET | Refills: 1 | Status: SHIPPED | OUTPATIENT
Start: 2025-02-05

## 2025-02-05 RX ORDER — EZETIMIBE 10 MG/1
10 TABLET ORAL DAILY
Qty: 90 TABLET | Refills: 1 | Status: SHIPPED | OUTPATIENT
Start: 2025-02-05

## 2025-02-05 RX ORDER — ATORVASTATIN CALCIUM 80 MG/1
80 TABLET, FILM COATED ORAL NIGHTLY
Qty: 90 TABLET | Refills: 1 | Status: SHIPPED | OUTPATIENT
Start: 2025-02-05

## 2025-02-05 NOTE — TELEPHONE ENCOUNTER
Rx Refill Note  Requested Prescriptions     Pending Prescriptions Disp Refills    ezetimibe (ZETIA) 10 MG tablet [Pharmacy Med Name: EZETIMIBE 10 MG TABLET] 90 tablet 1     Sig: TAKE 1 TABLET BY MOUTH DAILY    metFORMIN ER (GLUCOPHAGE-XR) 500 MG 24 hr tablet [Pharmacy Med Name: METFORMIN HCL  MG TABLET] 90 tablet 1     Sig: TAKE 1 TABLET BY MOUTH DAILY WITH BREAKFAST    atorvastatin (LIPITOR) 80 MG tablet [Pharmacy Med Name: ATORVASTATIN 80 MG TABLET] 90 tablet 1     Sig: TAKE ONE TABLET BY MOUTH ONCE NIGHTLY      Last office visit with prescribing clinician: 11/14/2024   Last telemedicine visit with prescribing clinician: Visit date not found   Next office visit with prescribing clinician: 3/13/2025                         Would you like a call back once the refill request has been completed: [] Yes [] No    If the office needs to give you a call back, can they leave a voicemail: [] Yes [] No    Vanita Negro  02/05/25, 11:36 EST

## 2025-02-13 DIAGNOSIS — E78.2 MIXED HYPERLIPIDEMIA: ICD-10-CM

## 2025-02-13 RX ORDER — LISINOPRIL 10 MG/1
10 TABLET ORAL DAILY
Qty: 90 TABLET | Refills: 0 | Status: SHIPPED | OUTPATIENT
Start: 2025-02-13

## 2025-03-06 ENCOUNTER — LAB (OUTPATIENT)
Dept: ENDOCRINOLOGY | Age: 60
End: 2025-03-06
Payer: COMMERCIAL

## 2025-03-06 DIAGNOSIS — E78.2 MIXED HYPERLIPIDEMIA: ICD-10-CM

## 2025-03-06 DIAGNOSIS — I10 PRIMARY HYPERTENSION: ICD-10-CM

## 2025-03-06 DIAGNOSIS — E13.9: ICD-10-CM

## 2025-03-07 LAB
ALBUMIN/CREAT UR: <11 MG/G CREAT (ref 0–29)
BUN SERPL-MCNC: 11 MG/DL (ref 6–24)
BUN/CREAT SERPL: 15 (ref 9–23)
CALCIUM SERPL-MCNC: 9.8 MG/DL (ref 8.7–10.2)
CHLORIDE SERPL-SCNC: 101 MMOL/L (ref 96–106)
CO2 SERPL-SCNC: 23 MMOL/L (ref 20–29)
CREAT SERPL-MCNC: 0.73 MG/DL (ref 0.57–1)
CREAT UR-MCNC: 27.8 MG/DL
EGFRCR SERPLBLD CKD-EPI 2021: 95 ML/MIN/1.73
GLUCOSE SERPL-MCNC: 152 MG/DL (ref 70–99)
HBA1C MFR BLD: 7.1 % (ref 4.8–5.6)
MICROALBUMIN UR-MCNC: <3 UG/ML
POTASSIUM SERPL-SCNC: 4.2 MMOL/L (ref 3.5–5.2)
SODIUM SERPL-SCNC: 139 MMOL/L (ref 134–144)

## 2025-03-13 ENCOUNTER — OFFICE VISIT (OUTPATIENT)
Dept: ENDOCRINOLOGY | Age: 60
End: 2025-03-13
Payer: COMMERCIAL

## 2025-03-13 VITALS
WEIGHT: 182.6 LBS | BODY MASS INDEX: 36.81 KG/M2 | HEART RATE: 92 BPM | DIASTOLIC BLOOD PRESSURE: 84 MMHG | HEIGHT: 59 IN | SYSTOLIC BLOOD PRESSURE: 128 MMHG | OXYGEN SATURATION: 97 %

## 2025-03-13 DIAGNOSIS — E78.2 MIXED HYPERLIPIDEMIA: ICD-10-CM

## 2025-03-13 DIAGNOSIS — I10 PRIMARY HYPERTENSION: ICD-10-CM

## 2025-03-13 DIAGNOSIS — E13.9: Primary | ICD-10-CM

## 2025-03-13 NOTE — PROGRESS NOTES
Chief Complaint  Chief Complaint   Patient presents with    Latent autoimmune diabetes mellitus in adult (AISHWARYA), manage       Subjective        History of Present Illness    Yuliana Moeller 59 y.o.  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)     She was diagnosed with diabetes in 2017 and started insulin in 07/2022  WILLIS positive in 07/2022  C.Peptide detectable in 06/2024        Pt is on the following medications for their diabetes: Lantus 23 units daily, Novolog 6 untis before breakfast and 10-12 units before dinner, Ozempic 1 mg weekly and metformin  mg 1 tablet daily         Denies nausea, diarrhea, constipation, chest pain, shortness of breath, vision changes or numbness and tingling in feet/hands.    Pt denies a history of diabetic retinopathy.  Last eye exam was 01/24     Pt does not have nephropathy.  Patient is currently taking ACE     Pt denies neuropathy.     Pt denies a history of CAD or CVA.    Last A1C in 03/25 was 7.1    Last microalbumin in 03/25 was negative           Blood Sugars    Blood glucoses are checked via Prasanna.    Fasting blood glucoses: 110s- 130s    Pre-meal blood glucoses: 61 - 310    Pt has some episodes of hypoglycemia due to taking too much insulin with breakfast at times          Sensor Data    Time in range 87%  High 11%  Very high 2%  Low 0%  Very low 0%      Average Glucose - 139 mg/dL      Sensor Wear - 96 %              Hyperlipidemia     Pt is currently taking atorvastatin 80 mg HS and Zetia 10 mg daily      Last lipid panel in 11/24 showed Total 165, HDL 63, LDL 88 and Triglycerides 72                 Hypertension     Pt denies any chest pain, palpitations, shortness of breath or headache     Current regimen includes lisinopril 10 mg daily             I have reviewed the patient's allergies, medicines, past medical hx, family hx and social hx.    Objective   Vital Signs:   /84 (BP Location: Left arm, Patient Position:  "Sitting)   Pulse 92   Ht 149.9 cm (59.02\")   Wt 82.8 kg (182 lb 9.6 oz)   SpO2 97%   BMI 36.86 kg/m²       Physical Exam   Physical Exam  Constitutional:       General: She is not in acute distress.     Appearance: Normal appearance. She is not diaphoretic.   HENT:      Head: Normocephalic and atraumatic.   Eyes:      General:         Right eye: No discharge.         Left eye: No discharge.   Skin:     General: Skin is warm and dry.   Neurological:      Mental Status: She is alert.   Psychiatric:         Mood and Affect: Mood normal.         Behavior: Behavior normal.                    Results Review:   Hemoglobin A1C   Date Value Ref Range Status   03/06/2025 7.1 (H) 4.8 - 5.6 % Final     Comment:              Prediabetes: 5.7 - 6.4           Diabetes: >6.4           Glycemic control for adults with diabetes: <7.0     05/26/2021 11.0 (H) 4.3 - 5.6 % Final     Comment:     (note)  A1C% Reference Range:  4.3 - 5.6  Normal range  5.7 - 6.4  Pre-diabetic -increased risk for developing diabetes mellitus.  >=6.5      Diabetic -diagnostic of diabetes mellitus.     Note: For diagnosis of diabetes in individuals without unequivocal   hyperglycemia, results should be confirmed by repeat testing.  Patients with conditions that shorten erythrocyte survival, such as  recovery from acute blood loss, hemolytic anemia, kidney disease,  or the presence of unstable hemoglobins like HbSS, HbCC, and HbSC  may yield falsely decreased HbA1c test results. Iron deficiency may  yield falsely increased HbA1c test results.     Triglycerides   Date Value Ref Range Status   11/07/2024 72 0 - 150 mg/dL Final   09/30/2020 156 (H) 0 - 149 mg/dL Final     HDL Cholesterol   Date Value Ref Range Status   11/07/2024 63 (H) 40 - 60 mg/dL Final   09/30/2020 47 (L) >49 mg/dL Final     LDL Cholesterol    Date Value Ref Range Status   09/30/2020 160 (H) 0 - 100 mg/dL Final     LDL Chol Calc (NIH)   Date Value Ref Range Status   11/07/2024 88 0 - 100 " mg/dL Final     VLDL Cholesterol   Date Value Ref Range Status   09/30/2020 31 5 - 40 mg/dL Final     VLDL Cholesterol Meek   Date Value Ref Range Status   11/07/2024 14 5 - 40 mg/dL Final         Assessment and Plan  Diagnoses and all orders for this visit:    1. 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 Mixed hyperlipidemia  -     Lipid Panel; Future  -     Comprehensive Metabolic Panel; Future    Continue with statin  Continue with Zetia  Check labs prior to next visit       3. Primary hypertension  -     Comprehensive Metabolic Panel; Future    Stable  Continue with current medication regimen  Defer management to PCP           No refills needed at this time      RTC in 4 months with me, labs prior       Follow Up    Patient was given instructions and counseling regarding her condition or for health maintenance advice. Please see specific information pulled into the AVS if appropriate.                Zeny Branch, JOSEPHINE  03/13/25

## 2025-04-09 DIAGNOSIS — E13.9: ICD-10-CM

## 2025-04-10 RX ORDER — SEMAGLUTIDE 1.34 MG/ML
1 INJECTION, SOLUTION SUBCUTANEOUS WEEKLY
Qty: 9 ML | Refills: 1 | Status: SHIPPED | OUTPATIENT
Start: 2025-04-10

## 2025-04-10 NOTE — TELEPHONE ENCOUNTER
Rx Refill Note  Requested Prescriptions     Pending Prescriptions Disp Refills    Ozempic, 1 MG/DOSE, 4 MG/3ML solution pen-injector [Pharmacy Med Name: OZEMPIC 1 MG/DOSE (4 MG/3 ML)] 9 mL 1     Sig: DIAL AND INJECT UNDER THE SKIN 1 MG WEEKLY      Last office visit with prescribing clinician: 3/13/2025   Last telemedicine visit with prescribing clinician: Visit date not found   Next office visit with prescribing clinician: 7/16/2025                         Would you like a call back once the refill request has been completed: [] Yes [] No    If the office needs to give you a call back, can they leave a voicemail: [] Yes [] No    Vanita Negro  04/10/25, 07:49 EDT

## 2025-04-30 ENCOUNTER — APPOINTMENT (OUTPATIENT)
Dept: WOMENS IMAGING | Facility: HOSPITAL | Age: 60
End: 2025-04-30
Payer: COMMERCIAL

## 2025-04-30 PROCEDURE — 76642 ULTRASOUND BREAST LIMITED: CPT | Performed by: RADIOLOGY

## 2025-04-30 PROCEDURE — G0279 TOMOSYNTHESIS, MAMMO: HCPCS | Performed by: RADIOLOGY

## 2025-04-30 PROCEDURE — 77065 DX MAMMO INCL CAD UNI: CPT | Performed by: RADIOLOGY

## 2025-04-30 PROCEDURE — 77061 BREAST TOMOSYNTHESIS UNI: CPT | Performed by: RADIOLOGY

## 2025-05-01 DIAGNOSIS — E78.2 MIXED HYPERLIPIDEMIA: ICD-10-CM

## 2025-05-01 RX ORDER — LISINOPRIL 10 MG/1
10 TABLET ORAL DAILY
Qty: 90 TABLET | Refills: 0 | OUTPATIENT
Start: 2025-05-01

## 2025-05-08 ENCOUNTER — OFFICE VISIT (OUTPATIENT)
Dept: FAMILY MEDICINE CLINIC | Facility: CLINIC | Age: 60
End: 2025-05-08
Payer: COMMERCIAL

## 2025-05-08 VITALS
DIASTOLIC BLOOD PRESSURE: 78 MMHG | RESPIRATION RATE: 16 BRPM | BODY MASS INDEX: 37.17 KG/M2 | HEART RATE: 79 BPM | WEIGHT: 184.4 LBS | SYSTOLIC BLOOD PRESSURE: 142 MMHG | OXYGEN SATURATION: 97 % | HEIGHT: 59 IN | TEMPERATURE: 97.9 F

## 2025-05-08 DIAGNOSIS — E53.8 B12 DEFICIENCY: ICD-10-CM

## 2025-05-08 DIAGNOSIS — E78.2 MIXED HYPERLIPIDEMIA: ICD-10-CM

## 2025-05-08 DIAGNOSIS — E55.9 VITAMIN D DEFICIENCY: Primary | ICD-10-CM

## 2025-05-08 DIAGNOSIS — Z86.2 H/O THROMBOCYTOSIS: ICD-10-CM

## 2025-05-08 DIAGNOSIS — F32.A ANXIETY AND DEPRESSION: ICD-10-CM

## 2025-05-08 DIAGNOSIS — F41.9 ANXIETY AND DEPRESSION: ICD-10-CM

## 2025-05-08 DIAGNOSIS — E11.65 TYPE 2 DIABETES MELLITUS WITH HYPERGLYCEMIA, WITHOUT LONG-TERM CURRENT USE OF INSULIN: ICD-10-CM

## 2025-05-08 PROCEDURE — 99214 OFFICE O/P EST MOD 30 MIN: CPT | Performed by: NURSE PRACTITIONER

## 2025-05-08 RX ORDER — LISINOPRIL 10 MG/1
10 TABLET ORAL DAILY
Qty: 90 TABLET | Refills: 1 | Status: SHIPPED | OUTPATIENT
Start: 2025-05-08

## 2025-05-08 RX ORDER — SERTRALINE HYDROCHLORIDE 100 MG/1
100 TABLET, FILM COATED ORAL DAILY
Qty: 90 TABLET | Refills: 1 | Status: SHIPPED | OUTPATIENT
Start: 2025-05-08

## 2025-05-08 NOTE — PROGRESS NOTES
"Subjective   Yuliana Moeller is a 59 y.o. female.     Hyperlipidemia  Pertinent negatives include no chest pain or myalgias.   Diabetes  Associated symptoms:     no chest pain, no fatigue, no visual change and no weakness    Hypoglycemia symptoms:     no headaches      Visit to renew RX for the year  Pertinent negative symptoms include no abdominal pain, no anorexia, no joint pain, no change in stool, no chest pain, no congestion, no cough, no diaphoresis, no fatigue, no headaches, no joint swelling, no myalgias, no nausea, no neck pain, no numbness, no rash, no sore throat, no swollen glands, no dysuria, no vertigo, no visual change, no vomiting and no weakness.      Chief Complaint     Hyperlipidemia  Diabetes (Follow up and medication review )     Since the last visit, she has overall felt fairly well.  She has DMII managed by Endocrinologist.  Last A1c was 7.1. She has next f/u appt in July.  she has been compliant with current medications have reviewed them.  The patient denies medication side effects.  Will refill medications. /78 (BP Location: Right arm, Patient Position: Sitting, Cuff Size: Adult)   Pulse 79   Temp 97.9 °F (36.6 °C) (Oral)   Resp 16   Ht 149.9 cm (59.02\")   Wt 83.6 kg (184 lb 6.4 oz)   SpO2 97%   BMI 37.22 kg/m² .          Results for orders placed or performed in visit on 03/06/25   Basic Metabolic Panel    Collection Time: 03/06/25 10:22 AM    Specimen: Blood   Result Value Ref Range    Glucose 152 (H) 70 - 99 mg/dL    BUN 11 6 - 24 mg/dL    Creatinine 0.73 0.57 - 1.00 mg/dL    EGFR Result 95 >59 mL/min/1.73    BUN/Creatinine Ratio 15 9 - 23    Sodium 139 134 - 144 mmol/L    Potassium 4.2 3.5 - 5.2 mmol/L    Chloride 101 96 - 106 mmol/L    Total CO2 23 20 - 29 mmol/L    Calcium 9.8 8.7 - 10.2 mg/dL   Microalbumin / Creatinine Urine Ratio - Urine, Clean Catch    Collection Time: 03/06/25 10:22 AM    Specimen: Urine, Clean Catch   Result Value Ref Range    Creatinine, Urine 27.8 " Not Estab. mg/dL    Microalbumin, Urine <3.0 Not Estab. ug/mL    Microalbumin/Creatinine Ratio <11 0 - 29 mg/g creat   Hemoglobin A1c    Collection Time: 03/06/25 10:22 AM    Specimen: Blood   Result Value Ref Range    Hemoglobin A1C 7.1 (H) 4.8 - 5.6 %             The following portions of the patient's history were reviewed and updated as appropriate: allergies, current medications, past family history, past medical history, past social history, past surgical history, and problem list.    Review of Systems   Constitutional:  Negative for diaphoresis and fatigue.   HENT:  Negative for congestion and sore throat.    Respiratory:  Negative for cough.    Cardiovascular:  Negative for chest pain.   Gastrointestinal:  Negative for abdominal pain, anorexia, nausea and vomiting.   Genitourinary:  Negative for dysuria.   Musculoskeletal:  Negative for joint pain, myalgias and neck pain.   Skin:  Negative for rash.   Neurological:  Negative for vertigo, weakness, numbness and headaches.       Objective   Physical Exam  Vitals and nursing note reviewed.   Constitutional:       Appearance: She is well-developed.   Cardiovascular:      Rate and Rhythm: Normal rate and regular rhythm.   Pulmonary:      Effort: Pulmonary effort is normal.      Breath sounds: Normal breath sounds.   Neurological:      Mental Status: She is alert and oriented to person, place, and time.   Psychiatric:         Mood and Affect: Mood normal.         Behavior: Behavior normal.         Thought Content: Thought content normal.         Judgment: Judgment normal.         Assessment & Plan   Diagnoses and all orders for this visit:    1. Vitamin D deficiency (Primary)  -     Vitamin D 25 hydroxy; Future  -     Vitamin B12; Future    2. Mixed hyperlipidemia  -     lisinopril (PRINIVIL,ZESTRIL) 10 MG tablet; Take 1 tablet by mouth Daily.  Dispense: 90 tablet; Refill: 1  -     sertraline (ZOLOFT) 100 MG tablet; Take 1 tablet by mouth Daily.  Dispense: 90  tablet; Refill: 1    3. Type 2 diabetes mellitus with hyperglycemia, without long-term current use of insulin  -     sertraline (ZOLOFT) 100 MG tablet; Take 1 tablet by mouth Daily.  Dispense: 90 tablet; Refill: 1  -     Vitamin D 25 hydroxy; Future  -     Vitamin B12; Future    4. B12 deficiency  -     sertraline (ZOLOFT) 100 MG tablet; Take 1 tablet by mouth Daily.  Dispense: 90 tablet; Refill: 1  -     Vitamin D 25 hydroxy; Future  -     Vitamin B12; Future    5. Anxiety and depression  -     sertraline (ZOLOFT) 100 MG tablet; Take 1 tablet by mouth Daily.  Dispense: 90 tablet; Refill: 1    6. H/O thrombocytosis  -     sertraline (ZOLOFT) 100 MG tablet; Take 1 tablet by mouth Daily.  Dispense: 90 tablet; Refill: 1

## 2025-06-13 DIAGNOSIS — E13.9: ICD-10-CM

## 2025-06-13 RX ORDER — INSULIN GLARGINE 100 [IU]/ML
INJECTION, SOLUTION SUBCUTANEOUS
Qty: 30 ML | Refills: 1 | Status: SHIPPED | OUTPATIENT
Start: 2025-06-13

## 2025-06-13 NOTE — TELEPHONE ENCOUNTER
Rx Refill Note  Requested Prescriptions     Pending Prescriptions Disp Refills    Lantus SoloStar 100 UNIT/ML injection pen [Pharmacy Med Name: LANTUS SOLOSTAR 100 UNIT/ML] 30 mL 1     Sig: INJECT 23-34 UNITS UNDER THE SKIN DAILY      Last office visit with prescribing clinician: 3/13/2025   Last telemedicine visit with prescribing clinician: Visit date not found   Next office visit with prescribing clinician: 7/16/2025                         Would you like a call back once the refill request has been completed: [] Yes [] No    If the office needs to give you a call back, can they leave a voicemail: [] Yes [] No    Vanita Negro  06/13/25, 12:59 EDT  Vanita Negro  6/13/2025  12:59 EDT

## 2025-07-09 DIAGNOSIS — I10 PRIMARY HYPERTENSION: ICD-10-CM

## 2025-07-09 DIAGNOSIS — E78.2 MIXED HYPERLIPIDEMIA: ICD-10-CM

## 2025-07-09 DIAGNOSIS — E13.9: ICD-10-CM

## 2025-07-09 LAB
ALBUMIN SERPL-MCNC: 4.5 G/DL (ref 3.5–5.2)
ALBUMIN/GLOB SERPL: 1.8 G/DL
ALP SERPL-CCNC: 99 U/L (ref 39–117)
ALT SERPL-CCNC: 22 U/L (ref 1–33)
AST SERPL-CCNC: 22 U/L (ref 1–32)
BILIRUB SERPL-MCNC: 0.3 MG/DL (ref 0–1.2)
BUN SERPL-MCNC: 12 MG/DL (ref 6–20)
BUN/CREAT SERPL: 17.6 (ref 7–25)
CALCIUM SERPL-MCNC: 9.4 MG/DL (ref 8.6–10.5)
CHLORIDE SERPL-SCNC: 102 MMOL/L (ref 98–107)
CHOLEST SERPL-MCNC: 165 MG/DL (ref 0–200)
CO2 SERPL-SCNC: 25.4 MMOL/L (ref 22–29)
CREAT SERPL-MCNC: 0.68 MG/DL (ref 0.57–1)
EGFRCR SERPLBLD CKD-EPI 2021: 101.1 ML/MIN/1.73
GLOBULIN SER CALC-MCNC: 2.5 GM/DL
GLUCOSE SERPL-MCNC: 132 MG/DL (ref 65–99)
HBA1C MFR BLD: 6.8 % (ref 4.8–5.6)
HDLC SERPL-MCNC: 65 MG/DL (ref 40–60)
IMP & REVIEW OF LAB RESULTS: NORMAL
LDLC SERPL CALC-MCNC: 87 MG/DL (ref 0–100)
POTASSIUM SERPL-SCNC: 4.1 MMOL/L (ref 3.5–5.2)
PROT SERPL-MCNC: 7 G/DL (ref 6–8.5)
SODIUM SERPL-SCNC: 138 MMOL/L (ref 136–145)
TRIGL SERPL-MCNC: 66 MG/DL (ref 0–150)
VLDLC SERPL CALC-MCNC: 13 MG/DL (ref 5–40)

## 2025-07-16 ENCOUNTER — OFFICE VISIT (OUTPATIENT)
Dept: ENDOCRINOLOGY | Age: 60
End: 2025-07-16
Payer: COMMERCIAL

## 2025-07-16 VITALS
HEIGHT: 59 IN | OXYGEN SATURATION: 98 % | SYSTOLIC BLOOD PRESSURE: 134 MMHG | TEMPERATURE: 98 F | DIASTOLIC BLOOD PRESSURE: 84 MMHG | HEART RATE: 70 BPM | WEIGHT: 184.8 LBS | BODY MASS INDEX: 37.25 KG/M2

## 2025-07-16 DIAGNOSIS — I10 PRIMARY HYPERTENSION: ICD-10-CM

## 2025-07-16 DIAGNOSIS — E78.2 MIXED HYPERLIPIDEMIA: ICD-10-CM

## 2025-07-16 DIAGNOSIS — E13.9: ICD-10-CM

## 2025-07-16 DIAGNOSIS — E13.9: Primary | ICD-10-CM

## 2025-07-16 PROCEDURE — 99214 OFFICE O/P EST MOD 30 MIN: CPT | Performed by: NURSE PRACTITIONER

## 2025-07-16 PROCEDURE — 95251 CONT GLUC MNTR ANALYSIS I&R: CPT | Performed by: NURSE PRACTITIONER

## 2025-07-16 RX ORDER — HYDROCHLOROTHIAZIDE 12.5 MG/1
1 CAPSULE ORAL
Qty: 6 EACH | Refills: 1 | Status: SHIPPED | OUTPATIENT
Start: 2025-07-16

## 2025-07-16 RX ORDER — SEMAGLUTIDE 1.34 MG/ML
1 INJECTION, SOLUTION SUBCUTANEOUS WEEKLY
Qty: 9 ML | Refills: 1 | Status: SHIPPED | OUTPATIENT
Start: 2025-07-16

## 2025-07-16 RX ORDER — HYDROCHLOROTHIAZIDE 12.5 MG/1
CAPSULE ORAL
Qty: 2 EACH | Refills: 0 | OUTPATIENT
Start: 2025-07-16

## 2025-07-16 NOTE — TELEPHONE ENCOUNTER
Rx Refill Note  Requested Prescriptions     Pending Prescriptions Disp Refills    ezetimibe (ZETIA) 10 MG tablet [Pharmacy Med Name: EZETIMIBE 10 MG TABLET] 90 tablet 1     Sig: TAKE 1 TABLET BY MOUTH DAILY    metFORMIN ER (GLUCOPHAGE-XR) 500 MG 24 hr tablet [Pharmacy Med Name: METFORMIN HCL  MG TABLET] 90 tablet 1     Sig: TAKE 1 TABLET BY MOUTH DAILY WITH BREAKFAST    atorvastatin (LIPITOR) 80 MG tablet [Pharmacy Med Name: ATORVASTATIN 80 MG TABLET] 90 tablet 1     Sig: TAKE 1 TABLET BY MOUTH ONCE NIGHTLY     Refused Prescriptions Disp Refills    Continuous Glucose Sensor (FreeStyle Prasanna 3 Plus Sensor) [Pharmacy Med Name: FREESTYLE PRASANNA 3 PLUS SENSOR] 2 each      Sig: APPLY SENSOR TO SKIN FOR CONTINUOUS BLOOD SUGAR MONITORING, REPLACE EVERY 15 DAYS      Last office visit with prescribing clinician: 7/16/2025   Last telemedicine visit with prescribing clinician: Visit date not found   Next office visit with prescribing clinician: 11/20/2025                         Would you like a call back once the refill request has been completed: [] Yes [] No    If the office needs to give you a call back, can they leave a voicemail: [] Yes [] No    Vanita Negro  07/16/25, 13:53 EDT

## 2025-07-16 NOTE — PATIENT INSTRUCTIONS
Decrease Lantus 21 units daily  Continue with Novolog 6 untis before breakfast and 10-12 units before dinner  Continue with Ozempic 1 mg weekly   Continue with metformin  mg 1 tablet daily      Continue with Prasanna

## 2025-07-16 NOTE — PROGRESS NOTES
Chief Complaint  Chief Complaint   Patient presents with    Latent autoimmune diabetes mellitus in adult (AISHWARYA), manage     Pt prasanna is attached, is up to date on eye exam, no hx of retinopathy or neuropathy.  Pt states that she is needing a refill on the prasanna.        Subjective        History of Present Illness    Yuliana Moeller 59 y.o. 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    Pt denies a history of diabetic retinopathy.  Last eye exam was 12/24     Pt does not have nephropathy.  Patient is currently taking ACE     Pt denies neuropathy.     Pt denies a history of CAD or CVA.    Last A1C in 07/25 was 6.80    Last microalbumin in 03/25 was negative             Blood Sugars    Blood glucoses are checked via Prasanna.    Fasting blood glucoses: 60s to mid 100s    Pre-meal blood glucoses: 60s- high 100s    Pt has episodes of hypoglycemia.            Sensor Data    Time in range 84%  High 13%  Very high 2%  Low 1%  Very low 0%      Average Glucose - 136 mg/dL      Sensor Wear - 94 %              Hyperlipidemia     Pt is currently taking atorvastatin 80 mg HS and Zetia 10 mg daily      Last lipid panel in 07/25 showed Total 165, HDL 65, LDL 87 and Triglycerides 66                 Hypertension     Pt denies any chest pain, palpitations, shortness of breath or headache     Current regimen includes lisinopril 10 mg daily               Objective   Vital Signs:   /84    "Pulse 70   Temp 98 °F (36.7 °C) (Oral)   Ht 149.9 cm (59.02\")   Wt 83.8 kg (184 lb 12.8 oz)   SpO2 98%   BMI 37.30 kg/m²       Physical Exam   Physical Exam  Constitutional:       General: She is not in acute distress.     Appearance: Normal appearance. She is not diaphoretic.   HENT:      Head: Normocephalic and atraumatic.   Eyes:      General:         Right eye: No discharge.         Left eye: No discharge.   Skin:     General: Skin is warm and dry.   Neurological:      Mental Status: She is alert.   Psychiatric:         Mood and Affect: Mood normal.         Behavior: Behavior normal.                    Results Review:   Hemoglobin A1C   Date Value Ref Range Status   07/09/2025 6.80 (H) 4.80 - 5.60 % Final     Comment:     Hemoglobin A1C Ranges:  Increased Risk for Diabetes  5.7% to 6.4%  Diabetes                     >= 6.5%  Diabetic Goal                < 7.0%     05/26/2021 11.0 (H) 4.3 - 5.6 % Final     Comment:     (note)  A1C% Reference Range:  4.3 - 5.6  Normal range  5.7 - 6.4  Pre-diabetic -increased risk for developing diabetes mellitus.  >=6.5      Diabetic -diagnostic of diabetes mellitus.     Note: For diagnosis of diabetes in individuals without unequivocal   hyperglycemia, results should be confirmed by repeat testing.  Patients with conditions that shorten erythrocyte survival, such as  recovery from acute blood loss, hemolytic anemia, kidney disease,  or the presence of unstable hemoglobins like HbSS, HbCC, and HbSC  may yield falsely decreased HbA1c test results. Iron deficiency may  yield falsely increased HbA1c test results.     Triglycerides   Date Value Ref Range Status   07/09/2025 66 0 - 150 mg/dL Final   09/30/2020 156 (H) 0 - 149 mg/dL Final     HDL Cholesterol   Date Value Ref Range Status   07/09/2025 65 (H) 40 - 60 mg/dL Final   09/30/2020 47 (L) >49 mg/dL Final     LDL Cholesterol    Date Value Ref Range Status   09/30/2020 160 (H) 0 - 100 mg/dL Final     LDL Chol Calc (NIH) "   Date Value Ref Range Status   07/09/2025 87 0 - 100 mg/dL Final     VLDL Cholesterol   Date Value Ref Range Status   09/30/2020 31 5 - 40 mg/dL Final     VLDL Cholesterol Meek   Date Value Ref Range Status   07/09/2025 13 5 - 40 mg/dL Final         Assessment and Plan  Diagnoses and all orders for this visit:    1. 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  Dispense: 9 mL; Refill: 1  -     Continuous Glucose Sensor (FreeStyle Prasanna 3 Plus Sensor); Use 1 each Every 15 (Fifteen) Days.  Dispense: 6 each; Refill: 1  -     Hemoglobin A1c; Future  -     Basic Metabolic Panel; Future    A1c is 6.8% and time in range is great at 84%  Some hypoglycemia noted mostly early morning hours  Decrease Lantus 21 units daily  Continue with Novolog 6 untis before breakfast and 10-12 units before dinner  Continue with Ozempic 1 mg weekly   Continue with metformin  mg 1 tablet daily      Continue with Prasanna        2. Mixed hyperlipidemia  -     Basic Metabolic Panel; Future    Lipid panel is good.    Continue with statin.  Continue with Zetia        3. Primary hypertension  -     Basic Metabolic Panel; Future    Stable  Continue with current medication regimen  Defer management to PCP         Refills sent to pharmacy        RTC in 4 months with me, labs prior           Follow Up    Patient was given instructions and counseling regarding her condition or for health maintenance advice. Please see specific information pulled into the AVS if appropriate.                Zeny Branch, JOSEPHINE  07/16/25

## 2025-07-17 RX ORDER — EZETIMIBE 10 MG/1
10 TABLET ORAL DAILY
Qty: 90 TABLET | Refills: 1 | Status: SHIPPED | OUTPATIENT
Start: 2025-07-17

## 2025-07-17 RX ORDER — ATORVASTATIN CALCIUM 80 MG/1
80 TABLET, FILM COATED ORAL NIGHTLY
Qty: 90 TABLET | Refills: 1 | Status: SHIPPED | OUTPATIENT
Start: 2025-07-17

## 2025-07-17 RX ORDER — METFORMIN HYDROCHLORIDE 500 MG/1
500 TABLET, EXTENDED RELEASE ORAL
Qty: 90 TABLET | Refills: 1 | Status: SHIPPED | OUTPATIENT
Start: 2025-07-17

## 2025-08-21 DIAGNOSIS — E13.9: ICD-10-CM

## 2025-08-22 RX ORDER — SEMAGLUTIDE 1.34 MG/ML
1 INJECTION, SOLUTION SUBCUTANEOUS WEEKLY
Qty: 9 ML | Refills: 1 | Status: SHIPPED | OUTPATIENT
Start: 2025-08-22